# Patient Record
Sex: FEMALE | Race: BLACK OR AFRICAN AMERICAN | NOT HISPANIC OR LATINO | Employment: FULL TIME | URBAN - METROPOLITAN AREA
[De-identification: names, ages, dates, MRNs, and addresses within clinical notes are randomized per-mention and may not be internally consistent; named-entity substitution may affect disease eponyms.]

---

## 2018-07-13 ENCOUNTER — DOCUMENTATION (OUTPATIENT)
Dept: BARIATRICS/WEIGHT MGMT | Facility: CLINIC | Age: 33
End: 2018-07-13

## 2018-07-13 DIAGNOSIS — R53.83 FATIGUE, UNSPECIFIED TYPE: ICD-10-CM

## 2018-07-13 DIAGNOSIS — R06.00 DYSPNEA, UNSPECIFIED TYPE: Primary | ICD-10-CM

## 2018-07-13 RX ORDER — AMLODIPINE BESYLATE AND ATORVASTATIN CALCIUM 2.5; 1 MG/1; MG/1
1 TABLET, FILM COATED ORAL EVERY MORNING
COMMUNITY
End: 2018-07-29

## 2018-07-13 RX ORDER — ACETAZOLAMIDE 500 MG/1
500 CAPSULE, EXTENDED RELEASE ORAL DAILY
COMMUNITY
End: 2021-07-05

## 2018-07-13 RX ORDER — HYDROCHLOROTHIAZIDE 25 MG/1
25 TABLET ORAL DAILY
COMMUNITY
End: 2019-03-31 | Stop reason: HOSPADM

## 2018-07-17 DIAGNOSIS — R06.00 DYSPNEA, UNSPECIFIED TYPE: ICD-10-CM

## 2018-07-17 DIAGNOSIS — R53.83 FATIGUE, UNSPECIFIED TYPE: ICD-10-CM

## 2018-07-18 ENCOUNTER — DOCUMENTATION (OUTPATIENT)
Dept: BARIATRICS/WEIGHT MGMT | Facility: CLINIC | Age: 33
End: 2018-07-18

## 2018-07-18 ENCOUNTER — OFFICE VISIT (OUTPATIENT)
Dept: BARIATRICS/WEIGHT MGMT | Facility: CLINIC | Age: 33
End: 2018-07-18

## 2018-07-18 VITALS
TEMPERATURE: 97.5 F | DIASTOLIC BLOOD PRESSURE: 72 MMHG | SYSTOLIC BLOOD PRESSURE: 118 MMHG | RESPIRATION RATE: 18 BRPM | WEIGHT: 293 LBS | BODY MASS INDEX: 47.09 KG/M2 | OXYGEN SATURATION: 99 % | HEIGHT: 66 IN | HEART RATE: 71 BPM

## 2018-07-18 DIAGNOSIS — R53.83 OTHER FATIGUE: ICD-10-CM

## 2018-07-18 DIAGNOSIS — R10.13 DYSPEPSIA: ICD-10-CM

## 2018-07-18 DIAGNOSIS — I10 HYPERTENSION, UNSPECIFIED TYPE: ICD-10-CM

## 2018-07-18 DIAGNOSIS — G93.2 PSEUDOTUMOR CEREBRI: ICD-10-CM

## 2018-07-18 DIAGNOSIS — F41.9 ANXIETY: ICD-10-CM

## 2018-07-18 DIAGNOSIS — E66.01 MORBID OBESITY WITH BMI OF 50.0-59.9, ADULT (HCC): ICD-10-CM

## 2018-07-18 DIAGNOSIS — K21.9 GASTROESOPHAGEAL REFLUX DISEASE, ESOPHAGITIS PRESENCE NOT SPECIFIED: ICD-10-CM

## 2018-07-18 DIAGNOSIS — E28.2 PCOS (POLYCYSTIC OVARIAN SYNDROME): ICD-10-CM

## 2018-07-18 DIAGNOSIS — E88.81 INSULIN RESISTANCE: ICD-10-CM

## 2018-07-18 DIAGNOSIS — R10.13 DYSPEPSIA: Primary | ICD-10-CM

## 2018-07-18 DIAGNOSIS — J30.9 ALLERGIC RHINITIS, UNSPECIFIED CHRONICITY, UNSPECIFIED SEASONALITY, UNSPECIFIED TRIGGER: ICD-10-CM

## 2018-07-18 DIAGNOSIS — R20.2 PARESTHESIAS: ICD-10-CM

## 2018-07-18 DIAGNOSIS — R06.2 WHEEZING: ICD-10-CM

## 2018-07-18 DIAGNOSIS — R60.0 LOWER EXTREMITY EDEMA: ICD-10-CM

## 2018-07-18 PROCEDURE — 99204 OFFICE O/P NEW MOD 45 MIN: CPT | Performed by: PHYSICIAN ASSISTANT

## 2018-07-18 RX ORDER — SODIUM CHLORIDE 9 MG/ML
100 INJECTION, SOLUTION INTRAVENOUS CONTINUOUS
Status: CANCELLED | OUTPATIENT
Start: 2018-07-18 | End: 2019-07-18

## 2018-07-18 NOTE — PROGRESS NOTES
[facility] BARIATRIC SURGERY  [address]  [facility] [address]  [phone]      Patient  Name:  Deepika Gonzalez  :  1985      Date of Visit: 2018      Chief Complaint:  weight gain; unable to maintain weight loss    History of Present Illness:  Deepika Gonzalez is a 32 y.o. female who presents today for evaluation, education and consultation regarding weight loss surgery. The patient is interested in sleeve gastrectomy.     Deepika has been overweight for at least 25 years, has been 35 pounds or more overweight for at least 20 years, has been 100 pounds or more overweight for 10 or more years and started dieting at age 20.      Previous diet attempts include: High Protein, Low Carbohydrate, Calorie Counting and Fasting; Weight Watchers; None.  The most weight Deepika lost was 60 pounds on diet/ exercise but was only able to maintain that weight loss for 1 year.  Her maximum lifetime weight is 333 pounds.    As above, patient has been overweight for many years, with numerous failed dietary/weight loss attempts.  She now has obesity related comorbidities and as such has decided to pursue weight loss surgery. Patient is very determined to be successful with weightloss with LSG. Looking to change her lifestyle to improve her overall health.     GI history: Has had reflux on PPI in the past, symptoms have improved and is off PPI now. No recent antacid use. No other GI complaints. No h/o h pylori.  EGD  showed evidence of reflux- per patient.  Still has GB.     All past medical, surgical, social and family history have been obtained and discussed as pertinent to bariatric surgery as below.     Past Medical History:   Diagnosis Date   • Allergic rhinitis    • Anxiety     seeing therapist   • Dyspepsia    • Fatigue    • GERD (gastroesophageal reflux disease)     has been on PPI in the past, improved recently- no longer on antacids. EGD , no h/o h pylori.    • HTN  (hypertension)    • Insulin resistance    • Lower extremity edema    • Morbid obesity with BMI of 50.0-59.9, adult (CMS/[facility])    • Paresthesias     of hands and feet, thought to be 2/2 diamox   • PCOS (polycystic ovarian syndrome)    • Pseudotumor cerebri     followed by opthalmologist- pending neurology specialist.  Has HA, on diamox.    • Wheezing     in the past due to allergies, improved now. No longer uses inhaler.      Past Surgical History:   Procedure Laterality Date   • EAR TUBES  1995    x 3   • ENDOSCOPY  2005    Dr Hay in [facility],  showed reflux       No Known Allergies    Current Outpatient Prescriptions:   •  acetaZOLAMIDE (DIAMOX) 500 MG capsule, Take 500 mg by mouth 2 (Two) Times a Day., Disp: , Rfl:   •  amLODIPine-atorvastatin (CADUET) 2.5-10 MG per tablet, Take 1 tablet by mouth Daily., Disp: , Rfl:   •  hydrochlorothiazide (HYDRODIURIL) 25 MG tablet, Take 25 mg by mouth Daily., Disp: , Rfl:     Social History     Social History   • Marital status: Single     Spouse name: N/A   • Number of children: N/A   • Years of education: Masters Degree      Occupational History   •       Social History Main Topics   • Smoking status: Never Smoker   • Smokeless tobacco: Never Used   • Alcohol use No   • Drug use: No   • Sexual activity: Not on file      Comment: no hormones     Other Topics Concern   • Not on file     Social History Narrative    Lives in [place] with mother.  Works in , travels a lot.      Family History   Problem Relation Age of Onset   • Obesity Mother    • Hypertension Mother    • Sleep apnea Mother    • Obesity Father    • Hypertension Father    • Heart disease Father    • Sleep apnea Father    • Heart disease Maternal Grandmother    • Hypertension Maternal Grandmother    • Diabetes Paternal Grandmother    • Hypertension Paternal Grandfather        Review of Systems:  Constitutional:  Reports fatigue, weight gain and denies fevers, chills.  HEENT:   allergies  Cardiovascular:  Reports HTN, edema and denies HLD, CAD, Atrial Fib, hx heart disease, heart murmur, hx MI, chest pain, palpitations, hx DVT.  Respiratory:  Reports wheezing and denies dyspnea on exertion, shortness of breath , cough , sleep apnea, asthma, hx PE.  Gastrointestinal:  Reports heartburn and denies dysphagia, nausea, vomiting, abdominal pain, IBS, diarrhea, constipation, melena, blood in stool, gallbladder issues, liver disease, hx pancreatitis.  Genitourinary:  Reports none and denies history of  frequent UTI, incontinence, hematuria, dysuria, polyuria, polydipsia, renal insufficiency, renal failure.    Musculoskeletal:  Reports none and denies joint pain, fibromyalgia, arthritis and autoimmune disease.  Neurological:  Reports headaches, pseudotumor cerebri and denies dizziness, confusion, seizure, stroke.  Psychiatric:  Reports anxiety and denies depression, bipolar disorder, hx suicide attempt, hx self injury, eating disorder.  Endocrine:  Reports glucose intolerance and denies thyroid disease, gout.  Hematologic:  Reports none and denies anemia, bleeding disorder, hx blood transfusion.  Skin:  Reports none and denies rashes, hx MRSA.      Physical Exam:  Vital Signs:  Weight: (!) 151 kg (333 lb 0.2 oz)   Body mass index is 54.57 kg/m².  Temp: 97.5 °F (36.4 °C)   Heart Rate: 71   BP: 118/72     Physical Exam   Constitutional: She is oriented to person, place, and time. She appears well-developed and well-nourished.   HENT:   Head: Normocephalic.   Mouth/Throat: Oropharynx is clear and moist.   Eyes: EOM are normal.   Neck: Normal range of motion. Neck supple. No thyromegaly present.   Cardiovascular: Normal rate, regular rhythm and normal heart sounds.    Pulmonary/Chest: Effort normal and breath sounds normal. No respiratory distress. She has no wheezes.   Abdominal: Soft. Bowel sounds are normal. She exhibits no distension. There is no tenderness.   Musculoskeletal: Normal range of  motion. She exhibits edema.   Neurological: She is alert and oriented to person, place, and time.   Skin: Skin is warm and dry.   Psychiatric: She has a normal mood and affect. Her behavior is normal. Judgment and thought content normal.   Vitals reviewed.      Patient Active Problem List   Diagnosis   • PCOS (polycystic ovarian syndrome)   • Pseudotumor cerebri   • HTN (hypertension)   • Insulin resistance   • Fatigue   • Morbid obesity with BMI of 50.0-59.9, adult (CMS/[facility])   • Dyspepsia   • Allergic rhinitis   • Lower extremity edema   • Anxiety   • Wheezing   • Paresthesias   • GERD (gastroesophageal reflux disease)       Assessment:    Deepika Gonzalez is a 32 y.o. year old female with medically complicated obesity pursuing sleeve gastrectomy.    Weight loss surgery is deemed medically necessary given the following obesity related comorbidities including hypertension, GERD, edema and depression with current Weight: (!) 151 kg (333 lb 0.2 oz) and Body mass index is 54.57 kg/m²..    Plan:  The consultation plan and program requirements were reviewed with the patient.  The patient has been advised that a letter of medical support must be obtained from her primary care physician or referring provider. A psychological evaluation will be arranged.  A nutritional evaluation will be performed.  The patient was advised to start a high protein and low carbohydrate diet.  Necessary lifestyle modifications were discussed.  Instructions on how to access JOSE was given to the patient.  JOSE is an internet based educational video that explains the surgical procedure chosen and answers basic questions regarding that procedure.     Preoperative testing will include: CBC, CMP, Lipids, TSH, HgA1C, H.Pylori, Pulmonary Function Testing, CXR, EKG and EGD     Additional preop clearances required prior to surgery: Cardiac.  Will schedule with [facility].     The patient has been educated on expected postoperative lifestyle changes, including  commitment to high protein diet, vitamin regimen, and exercise program.  They are aware that support groups are encouraged for optimal weight loss results. Patient understands that bariatric surgery is not cosmetic surgery but rather a tool to help make a lifelong commitment to lifestyle changes including diet, exercise, behavior modifications, and healthy habits. The procedure was discussed with the patient and all questions were answered. The importance of avoiding ASA/ NSAIDS/ steroids/ tobacco/ hormones/ immunomodulators perioperatively was discussed.         Roma Delgadillo PA-C

## 2018-07-19 LAB
ALBUMIN SERPL-MCNC: 4 G/DL (ref 3.5–5.5)
ALBUMIN/GLOB SERPL: 1.3 {RATIO} (ref 1.2–2.2)
ALP SERPL-CCNC: 88 IU/L (ref 39–117)
ALT SERPL-CCNC: 12 IU/L (ref 0–32)
AST SERPL-CCNC: 15 IU/L (ref 0–40)
BILIRUB SERPL-MCNC: 0.5 MG/DL (ref 0–1.2)
BUN SERPL-MCNC: 11 MG/DL (ref 6–20)
BUN/CREAT SERPL: 12 (ref 9–23)
CALCIUM SERPL-MCNC: 8.7 MG/DL (ref 8.7–10.2)
CHLORIDE SERPL-SCNC: 110 MMOL/L (ref 96–106)
CHOLEST SERPL-MCNC: 158 MG/DL (ref 100–199)
CO2 SERPL-SCNC: 18 MMOL/L (ref 20–29)
CREAT SERPL-MCNC: 0.92 MG/DL (ref 0.57–1)
ERYTHROCYTE [DISTWIDTH] IN BLOOD BY AUTOMATED COUNT: 16.3 % (ref 12.3–15.4)
GLOBULIN SER CALC-MCNC: 3.2 G/DL (ref 1.5–4.5)
GLUCOSE SERPL-MCNC: 86 MG/DL (ref 65–99)
HCT VFR BLD AUTO: 36 % (ref 34–46.6)
HDLC SERPL-MCNC: 60 MG/DL
HGB BLD-MCNC: 12 G/DL (ref 11.1–15.9)
LDLC SERPL CALC-MCNC: 87 MG/DL (ref 0–99)
MCH RBC QN AUTO: 28.5 PG (ref 26.6–33)
MCHC RBC AUTO-ENTMCNC: 33.3 G/DL (ref 31.5–35.7)
MCV RBC AUTO: 86 FL (ref 79–97)
PLATELET # BLD AUTO: 363 X10E3/UL (ref 150–379)
POTASSIUM SERPL-SCNC: 4.3 MMOL/L (ref 3.5–5.2)
PROT SERPL-MCNC: 7.2 G/DL (ref 6–8.5)
RBC # BLD AUTO: 4.21 X10E6/UL (ref 3.77–5.28)
SODIUM SERPL-SCNC: 138 MMOL/L (ref 134–144)
TRIGL SERPL-MCNC: 53 MG/DL (ref 0–149)
TSH SERPL DL<=0.005 MIU/L-ACNC: 1.57 UIU/ML (ref 0.45–4.5)
VLDLC SERPL CALC-MCNC: 11 MG/DL (ref 5–40)
WBC # BLD AUTO: 8.5 X10E3/UL (ref 3.4–10.8)

## 2018-07-20 PROBLEM — K21.9 GASTROESOPHAGEAL REFLUX DISEASE: Status: ACTIVE | Noted: 2018-07-20

## 2018-07-24 NOTE — PROGRESS NOTES
Weight Loss Surgery  Presurgical Nutrition Assessment     Deepika Gonzalez  07/18/2018  [id number]  [id number]  1985  female    Surgery desired: Sleeve Gastrectomy    Weight: (!) 151 kg (333 lb 0.2 oz)   Body mass index is 54.57 kg/m².  Past Medical History:   Diagnosis Date   • Allergic rhinitis    • Anxiety     seeing therapist   • Dyspepsia    • Fatigue    • GERD (gastroesophageal reflux disease)     has been on PPI in the past, improved recently- no longer on antacids. EGD 2005, no h/o h pylori.    • HTN (hypertension)    • Insulin resistance    • Lower extremity edema    • Morbid obesity with BMI of 50.0-59.9, adult (CMS/[facility])    • Paresthesias     of hands and feet, thought to be 2/2 diamox   • PCOS (polycystic ovarian syndrome)    • Pseudotumor cerebri     followed by opthalmologist- pending neurology specialist.  Has HA, on diamox.    • Wheezing     in the past due to allergies, improved now. No longer uses inhaler.      Past Surgical History:   Procedure Laterality Date   • EAR TUBES  1995    x 3   • ENDOSCOPY  2005    Dr Hay in [facility],  showed reflux     No Known Allergies    Current Outpatient Prescriptions:   •  acetaZOLAMIDE (DIAMOX) 500 MG capsule, Take 500 mg by mouth 2 (Two) Times a Day., Disp: , Rfl:   •  amLODIPine-atorvastatin (CADUET) 2.5-10 MG per tablet, Take 1 tablet by mouth Daily., Disp: , Rfl:   •  hydrochlorothiazide (HYDRODIURIL) 25 MG tablet, Take 25 mg by mouth Daily., Disp: , Rfl:       Nutrition Assessment    Estimated energy needs: 2400    Estimated calories for weight loss: 1800    IBW (Pounds):  150      Excess body weight (Pounds):183       Nutrition Recall  24 Hour recall: (B) (L) (D) -  Reviewed and discussed with patient       Exercise  never      Education    Provided handouts for Sleeve Gastrectomy    Provided follow-up options for support, including contact information for dietitians here, if desired.  Web-based support information and apps for smart phones and computers  given.  Noted that monthly support group is offered at this clinic, and that support is associated with weight loss.    Recommend that team proceed with surgery and follow per protocol.      Nutrition Goals   Dietary Guidelines per manual  Protein goal:  grams per day   Eliminate soda    Exercise Goals  Add 15-30 minutes of activity per day as tolerated        Lucero Ragland RD  07/18/2018  1:23 PM

## 2018-07-29 ENCOUNTER — APPOINTMENT (OUTPATIENT)
Dept: PREADMISSION TESTING | Facility: HOSPITAL | Age: 33
End: 2018-07-29

## 2018-07-29 PROCEDURE — 93010 ELECTROCARDIOGRAM REPORT: CPT | Performed by: INTERNAL MEDICINE

## 2018-07-29 PROCEDURE — 93005 ELECTROCARDIOGRAM TRACING: CPT

## 2018-07-29 RX ORDER — BUSPIRONE HYDROCHLORIDE 15 MG/1
15 TABLET ORAL 2 TIMES DAILY PRN
COMMUNITY
End: 2021-07-05

## 2018-07-29 RX ORDER — AMLODIPINE BESYLATE AND ATORVASTATIN CALCIUM 5; 10 MG/1; MG/1
1 TABLET, FILM COATED ORAL DAILY
COMMUNITY
End: 2018-09-20

## 2018-07-29 NOTE — PAT
Too early to obtain potassium in PAT -please obtain in preop    CBC 7/18/18 on chart     Patient is a alexis and requests that extreme caution be used during intubation/anesthesia/EGD in relation to vocal cords.  Orange note placed on chart as well

## 2018-07-29 NOTE — DISCHARGE INSTRUCTIONS

## 2018-08-01 ENCOUNTER — ANESTHESIA EVENT (OUTPATIENT)
Dept: GASTROENTEROLOGY | Facility: HOSPITAL | Age: 33
End: 2018-08-01

## 2018-08-02 ENCOUNTER — ANESTHESIA (OUTPATIENT)
Dept: GASTROENTEROLOGY | Facility: HOSPITAL | Age: 33
End: 2018-08-02

## 2018-08-02 ENCOUNTER — HOSPITAL ENCOUNTER (OUTPATIENT)
Facility: HOSPITAL | Age: 33
Setting detail: HOSPITAL OUTPATIENT SURGERY
Discharge: HOME | End: 2018-08-02
Attending: SURGERY | Admitting: SURGERY

## 2018-08-02 VITALS
HEART RATE: 64 BPM | RESPIRATION RATE: 20 BRPM | DIASTOLIC BLOOD PRESSURE: 73 MMHG | SYSTOLIC BLOOD PRESSURE: 128 MMHG | OXYGEN SATURATION: 100 % | TEMPERATURE: 97.4 F

## 2018-08-02 DIAGNOSIS — K21.9 GASTROESOPHAGEAL REFLUX DISEASE, ESOPHAGITIS PRESENCE NOT SPECIFIED: ICD-10-CM

## 2018-08-02 LAB — B-HCG UR QL: NEGATIVE

## 2018-08-02 PROCEDURE — 25010000002 PROPOFOL 1000 MG/ML EMULSION: Performed by: NURSE ANESTHETIST, CERTIFIED REGISTERED

## 2018-08-02 PROCEDURE — 43239 EGD BIOPSY SINGLE/MULTIPLE: CPT | Performed by: SURGERY

## 2018-08-02 PROCEDURE — 88305 TISSUE EXAM BY PATHOLOGIST: CPT | Performed by: SURGERY

## 2018-08-02 PROCEDURE — 25010000002 PROPOFOL 10 MG/ML EMULSION: Performed by: NURSE ANESTHETIST, CERTIFIED REGISTERED

## 2018-08-02 PROCEDURE — 84132 ASSAY OF SERUM POTASSIUM: CPT | Performed by: ANESTHESIOLOGY

## 2018-08-02 PROCEDURE — 81025 URINE PREGNANCY TEST: CPT | Performed by: ANESTHESIOLOGY

## 2018-08-02 RX ORDER — FAMOTIDINE 20 MG/1
20 TABLET, FILM COATED ORAL ONCE
Status: DISCONTINUED | OUTPATIENT
Start: 2018-08-02 | End: 2018-08-02 | Stop reason: HOSPADM

## 2018-08-02 RX ORDER — SODIUM CHLORIDE 9 MG/ML
100 INJECTION, SOLUTION INTRAVENOUS CONTINUOUS
Status: DISCONTINUED | OUTPATIENT
Start: 2018-08-02 | End: 2018-08-14 | Stop reason: HOSPADM

## 2018-08-02 RX ORDER — LIDOCAINE HYDROCHLORIDE 10 MG/ML
INJECTION, SOLUTION EPIDURAL; INFILTRATION; INTRACAUDAL; PERINEURAL AS NEEDED
Status: DISCONTINUED | OUTPATIENT
Start: 2018-08-02 | End: 2018-08-02 | Stop reason: SURG

## 2018-08-02 RX ORDER — LIDOCAINE HYDROCHLORIDE 10 MG/ML
0.5 INJECTION, SOLUTION EPIDURAL; INFILTRATION; INTRACAUDAL; PERINEURAL ONCE AS NEEDED
Status: DISCONTINUED | OUTPATIENT
Start: 2018-08-02 | End: 2018-08-02 | Stop reason: HOSPADM

## 2018-08-02 RX ORDER — SODIUM CHLORIDE 0.9 % (FLUSH) 0.9 %
1-10 SYRINGE (ML) INJECTION AS NEEDED
Status: DISCONTINUED | OUTPATIENT
Start: 2018-08-02 | End: 2018-08-02 | Stop reason: HOSPADM

## 2018-08-02 RX ORDER — SODIUM CHLORIDE, SODIUM LACTATE, POTASSIUM CHLORIDE, CALCIUM CHLORIDE 600; 310; 30; 20 MG/100ML; MG/100ML; MG/100ML; MG/100ML
9 INJECTION, SOLUTION INTRAVENOUS CONTINUOUS
Status: DISCONTINUED | OUTPATIENT
Start: 2018-08-02 | End: 2018-08-14 | Stop reason: HOSPADM

## 2018-08-02 RX ORDER — ONDANSETRON 2 MG/ML
4 INJECTION INTRAMUSCULAR; INTRAVENOUS ONCE AS NEEDED
Status: DISCONTINUED | OUTPATIENT
Start: 2018-08-02 | End: 2018-08-14 | Stop reason: HOSPADM

## 2018-08-02 RX ORDER — PROPOFOL 10 MG/ML
VIAL (ML) INTRAVENOUS AS NEEDED
Status: DISCONTINUED | OUTPATIENT
Start: 2018-08-02 | End: 2018-08-02 | Stop reason: SURG

## 2018-08-02 RX ORDER — FAMOTIDINE 10 MG/ML
20 INJECTION, SOLUTION INTRAVENOUS ONCE
Status: COMPLETED | OUTPATIENT
Start: 2018-08-02 | End: 2018-08-02

## 2018-08-02 RX ADMIN — PROPOFOL 100 MCG/KG/MIN: 10 INJECTION, EMULSION INTRAVENOUS at 12:39

## 2018-08-02 RX ADMIN — SODIUM CHLORIDE 100 ML/HR: 9 INJECTION, SOLUTION INTRAVENOUS at 11:59

## 2018-08-02 RX ADMIN — PROPOFOL 150 MG: 10 INJECTION, EMULSION INTRAVENOUS at 12:39

## 2018-08-02 RX ADMIN — SODIUM CHLORIDE, POTASSIUM CHLORIDE, SODIUM LACTATE AND CALCIUM CHLORIDE: 600; 310; 30; 20 INJECTION, SOLUTION INTRAVENOUS at 12:33

## 2018-08-02 RX ADMIN — LIDOCAINE HYDROCHLORIDE 100 MG: 10 INJECTION, SOLUTION EPIDURAL; INFILTRATION; INTRACAUDAL; PERINEURAL at 12:39

## 2018-08-02 RX ADMIN — FAMOTIDINE 20 MG: 10 INJECTION INTRAVENOUS at 11:59

## 2018-08-02 NOTE — ANESTHESIA PREPROCEDURE EVALUATION
Anesthesia Evaluation     Patient summary reviewed and Nursing notes reviewed   NPO Solid Status: > 8 hours  NPO Liquid Status: > 8 hours           Airway   Mallampati: I  TM distance: >3 FB  Neck ROM: full  No difficulty expected  Dental - normal exam     Pulmonary    Cardiovascular     ECG reviewed    (+) hypertension,       Neuro/Psych  GI/Hepatic/Renal/Endo    (+) morbid obesity,      Musculoskeletal     Abdominal    Substance History      OB/GYN    (-)  Pregnant        Other                      Anesthesia Plan    ASA 2     general     intravenous induction   Anesthetic plan and risks discussed with patient.    Plan discussed with CRNA.

## 2018-08-02 NOTE — H&P (VIEW-ONLY)
[facility] BARIATRIC SURGERY  [address]  [facility] [address]  [phone]      Patient  Name:  Deepika Gonzalez  :  1985      Date of Visit: 2018      Chief Complaint:  weight gain; unable to maintain weight loss    History of Present Illness:  Deepika Gonzalez is a 32 y.o. female who presents today for evaluation, education and consultation regarding weight loss surgery. The patient is interested in sleeve gastrectomy.     Deepika has been overweight for at least 25 years, has been 35 pounds or more overweight for at least 20 years, has been 100 pounds or more overweight for 10 or more years and started dieting at age 20.      Previous diet attempts include: High Protein, Low Carbohydrate, Calorie Counting and Fasting; Weight Watchers; None.  The most weight Deepika lost was 60 pounds on diet/ exercise but was only able to maintain that weight loss for 1 year.  Her maximum lifetime weight is 333 pounds.    As above, patient has been overweight for many years, with numerous failed dietary/weight loss attempts.  She now has obesity related comorbidities and as such has decided to pursue weight loss surgery. Patient is very determined to be successful with weightloss with LSG. Looking to change her lifestyle to improve her overall health.     GI history: Has had reflux on PPI in the past, symptoms have improved and is off PPI now. No recent antacid use. No other GI complaints. No h/o h pylori.  EGD  showed evidence of reflux- per patient.  Still has GB.     All past medical, surgical, social and family history have been obtained and discussed as pertinent to bariatric surgery as below.     Past Medical History:   Diagnosis Date   • Allergic rhinitis    • Anxiety     seeing therapist   • Dyspepsia    • Fatigue    • GERD (gastroesophageal reflux disease)     has been on PPI in the past, improved recently- no longer on antacids. EGD , no h/o h pylori.    • HTN  (hypertension)    • Insulin resistance    • Lower extremity edema    • Morbid obesity with BMI of 50.0-59.9, adult (CMS/[facility])    • Paresthesias     of hands and feet, thought to be 2/2 diamox   • PCOS (polycystic ovarian syndrome)    • Pseudotumor cerebri     followed by opthalmologist- pending neurology specialist.  Has HA, on diamox.    • Wheezing     in the past due to allergies, improved now. No longer uses inhaler.      Past Surgical History:   Procedure Laterality Date   • EAR TUBES  1995    x 3   • ENDOSCOPY  2005    Dr Hay in [facility],  showed reflux       No Known Allergies    Current Outpatient Prescriptions:   •  acetaZOLAMIDE (DIAMOX) 500 MG capsule, Take 500 mg by mouth 2 (Two) Times a Day., Disp: , Rfl:   •  amLODIPine-atorvastatin (CADUET) 2.5-10 MG per tablet, Take 1 tablet by mouth Daily., Disp: , Rfl:   •  hydrochlorothiazide (HYDRODIURIL) 25 MG tablet, Take 25 mg by mouth Daily., Disp: , Rfl:     Social History     Social History   • Marital status: Single     Spouse name: N/A   • Number of children: N/A   • Years of education: Masters Degree      Occupational History   •       Social History Main Topics   • Smoking status: Never Smoker   • Smokeless tobacco: Never Used   • Alcohol use No   • Drug use: No   • Sexual activity: Not on file      Comment: no hormones     Other Topics Concern   • Not on file     Social History Narrative    Lives in [place] with [place].  Works in , travels a lot.      Family History   Problem Relation Age of Onset   • Obesity Mother    • Hypertension Mother    • Sleep apnea Mother    • Obesity Father    • Hypertension Father    • Heart disease Father    • Sleep apnea Father    • Heart disease Maternal Grandmother    • Hypertension Maternal Grandmother    • Diabetes Paternal Grandmother    • Hypertension Paternal Grandfather        Review of Systems:  Constitutional:  Reports fatigue, weight gain and denies fevers, chills.  HEENT:   allergies  Cardiovascular:  Reports HTN, edema and denies HLD, CAD, Atrial Fib, hx heart disease, heart murmur, hx MI, chest pain, palpitations, hx DVT.  Respiratory:  Reports wheezing and denies dyspnea on exertion, shortness of breath , cough , sleep apnea, asthma, hx PE.  Gastrointestinal:  Reports heartburn and denies dysphagia, nausea, vomiting, abdominal pain, IBS, diarrhea, constipation, melena, blood in stool, gallbladder issues, liver disease, hx pancreatitis.  Genitourinary:  Reports none and denies history of  frequent UTI, incontinence, hematuria, dysuria, polyuria, polydipsia, renal insufficiency, renal failure.    Musculoskeletal:  Reports none and denies joint pain, fibromyalgia, arthritis and autoimmune disease.  Neurological:  Reports headaches, pseudotumor cerebri and denies dizziness, confusion, seizure, stroke.  Psychiatric:  Reports anxiety and denies depression, bipolar disorder, hx suicide attempt, hx self injury, eating disorder.  Endocrine:  Reports glucose intolerance and denies thyroid disease, gout.  Hematologic:  Reports none and denies anemia, bleeding disorder, hx blood transfusion.  Skin:  Reports none and denies rashes, hx MRSA.      Physical Exam:  Vital Signs:  Weight: (!) 151 kg (333 lb 0.2 oz)   Body mass index is 54.57 kg/m².  Temp: 97.5 °F (36.4 °C)   Heart Rate: 71   BP: 118/72     Physical Exam   Constitutional: She is oriented to person, place, and time. She appears well-developed and well-nourished.   HENT:   Head: Normocephalic.   Mouth/Throat: Oropharynx is clear and moist.   Eyes: EOM are normal.   Neck: Normal range of motion. Neck supple. No thyromegaly present.   Cardiovascular: Normal rate, regular rhythm and normal heart sounds.    Pulmonary/Chest: Effort normal and breath sounds normal. No respiratory distress. She has no wheezes.   Abdominal: Soft. Bowel sounds are normal. She exhibits no distension. There is no tenderness.   Musculoskeletal: Normal range of  motion. She exhibits edema.   Neurological: She is alert and oriented to person, place, and time.   Skin: Skin is warm and dry.   Psychiatric: She has a normal mood and affect. Her behavior is normal. Judgment and thought content normal.   Vitals reviewed.      Patient Active Problem List   Diagnosis   • PCOS (polycystic ovarian syndrome)   • Pseudotumor cerebri   • HTN (hypertension)   • Insulin resistance   • Fatigue   • Morbid obesity with BMI of 50.0-59.9, adult (CMS/[facility])   • Dyspepsia   • Allergic rhinitis   • Lower extremity edema   • Anxiety   • Wheezing   • Paresthesias   • GERD (gastroesophageal reflux disease)       Assessment:    Deepika Gonzalez is a 32 y.o. year old female with medically complicated obesity pursuing sleeve gastrectomy.    Weight loss surgery is deemed medically necessary given the following obesity related comorbidities including hypertension, GERD, edema and depression with current Weight: (!) 151 kg (333 lb 0.2 oz) and Body mass index is 54.57 kg/m²..    Plan:  The consultation plan and program requirements were reviewed with the patient.  The patient has been advised that a letter of medical support must be obtained from her primary care physician or referring provider. A psychological evaluation will be arranged.  A nutritional evaluation will be performed.  The patient was advised to start a high protein and low carbohydrate diet.  Necessary lifestyle modifications were discussed.  Instructions on how to access JOSE was given to the patient.  JOSE is an internet based educational video that explains the surgical procedure chosen and answers basic questions regarding that procedure.     Preoperative testing will include: CBC, CMP, Lipids, TSH, HgA1C, H.Pylori, Pulmonary Function Testing, CXR, EKG and EGD     Additional preop clearances required prior to surgery: Cardiac.  Will schedule with [facility].     The patient has been educated on expected postoperative lifestyle changes, including  commitment to high protein diet, vitamin regimen, and exercise program.  They are aware that support groups are encouraged for optimal weight loss results. Patient understands that bariatric surgery is not cosmetic surgery but rather a tool to help make a lifelong commitment to lifestyle changes including diet, exercise, behavior modifications, and healthy habits. The procedure was discussed with the patient and all questions were answered. The importance of avoiding ASA/ NSAIDS/ steroids/ tobacco/ hormones/ immunomodulators perioperatively was discussed.         Roma Delgadillo PA-C

## 2018-08-02 NOTE — BRIEF OP NOTE
ESOPHAGOGASTRODUODENOSCOPY WITH BIOPSY  Progress Note    Deepika Gonzalez  8/2/2018    Pre-op Diagnosis:   Gastroesophageal reflux disease, esophagitis presence not specified [K21.9]       Post-Op Diagnosis Codes:     * Gastroesophageal reflux disease, esophagitis presence not specified [K21.9]    Procedure/CPT® Codes:  WA EGD TRANSORAL BIOPSY SINGLE/MULTIPLE [41436]    Procedure(s):  ESOPHAGOGASTRODUODENOSCOPY WITH BIOPSY    Surgeon(s):  Daquan Martinez MD    Anesthesia: * No anesthesia type entered *    Staff:   Circulator: Sis You RN  Endo Technician: Giuliana Lezama    Estimated Blood Loss: none    Urine Voided: * No values recorded between 8/2/2018 12:33 PM and 8/2/2018 12:40 PM *    Specimens:                ID Type Source Tests Collected by Time   A : antrum bx Tissue Gastric, Antrum TISSUE PATHOLOGY EXAM Daquan Martinez MD 8/2/2018 1238   B : distal esophagus bx Tissue Esophagus, Distal TISSUE PATHOLOGY EXAM Daquan Martinez MD 8/2/2018 1239         Drains:      Findings:     Complications: none      Daquan Martinez MD     Date: 8/2/2018  Time: 12:44 PM

## 2018-08-02 NOTE — ANESTHESIA POSTPROCEDURE EVALUATION
Patient: Deepika Gonzalez    Procedure Summary     Date:  08/02/18 Room / Location:   [facility] 2 /  [facility]    Anesthesia Start:  1233 Anesthesia Stop:      Procedure:  ESOPHAGOGASTRODUODENOSCOPY WITH BIOPSY Diagnosis:       Gastroesophageal reflux disease, esophagitis presence not specified      (Gastroesophageal reflux disease, esophagitis presence not specified [K21.9])    Surgeon:  Daquan Martinez MD Provider:  Paco Roberts MD    Anesthesia Type:  general ASA Status:  2          Anesthesia Type: general  Last vitals  BP   132/78   Temp   97.5   Pulse   86   Resp   18     SpO2   99%     Post Anesthesia Care and Evaluation    Patient location during evaluation: PACU  Patient participation: complete - patient participated  Level of consciousness: awake  Pain score: 0  Pain management: adequate  Airway patency: patent  Anesthetic complications: No anesthetic complications  PONV Status: none  Cardiovascular status: acceptable and stable  Respiratory status: nasal cannula, unassisted and acceptable  Hydration status: acceptable

## 2018-08-02 NOTE — OP NOTE
Preoperative Diagnosis:  GERD    Postoperative Diagnosis:  Same    Procedure:   EGD with biopsy x 2    Surgeon:  Michelle    Anesth:  MAC    Specimens: antrum, DE    Complics:  None    Findings:  Unremarkable exam, z line 38 cm    Indications:   This is a 33 yo super MO BF interested in LSG.  I did her friend's LSG. She had a hx of NOA dx by EGD 2006, sx's better last few years, no meds.  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   Please see our office notes.   Risks, benefits and alternative therapies were discussed and the patient wishes to proceed with diagnostic possible therapeutic upper endoscopy    Operative Technique:  The patient was brought to the endoscopy suite and placed supine upon the stretcher. A bite block was placed.  The patient was sedated by the anesthesiology staff and the standard flexible endoscope was advanced under direct visualization into the posterior pharynx, vocal cords appeared normal and the esophagus was intubated and the endoscope advanced easily through the esophagus, and into the gastric cavity. The pylorus was readily identified and intubated and the endoscope advanced into the first and second portions of the duodenum.  The endoscope was withdrawn to the antrum and a biopsy was obtained.  Retroflexed examination was performed visualizing the hiatus, cardia, fundus and body.   The endoscope was withdrawn to the Z line and photodocumentation obtained.  The endoscope was slowly withdrawn, no new abnormalities noted.      Prox esoph - no strictures, distention, retained secretions or tertiary spasms.  Distal esoph - no hiatal hernia, Peng's or gross esophagitis.  Gastric mucosa grossly normal.  Pylorus not deformed or in spasm.  Duodenum unremarkable.  Retroflexed exam no HH or other abnormalities of the cardia, body or fundus.  Zline 38 cm, bx above the zline obtained.        The patient tolerated the procedure well without  complication and was taken to the recovery room in stable condition.

## 2018-08-03 LAB
CYTO UR: NORMAL
LAB AP CASE REPORT: NORMAL
LAB AP CLINICAL INFORMATION: NORMAL
PATH REPORT.FINAL DX SPEC: NORMAL
PATH REPORT.GROSS SPEC: NORMAL

## 2018-08-06 LAB — POTASSIUM BLDA-SCNC: 2.63 MMOL/L (ref 3.5–5.3)

## 2018-09-20 ENCOUNTER — CONSULT (OUTPATIENT)
Dept: CARDIOLOGY | Facility: CLINIC | Age: 33
End: 2018-09-20

## 2018-09-20 VITALS
DIASTOLIC BLOOD PRESSURE: 60 MMHG | HEIGHT: 65 IN | WEIGHT: 293 LBS | BODY MASS INDEX: 48.82 KG/M2 | HEART RATE: 85 BPM | SYSTOLIC BLOOD PRESSURE: 118 MMHG

## 2018-09-20 DIAGNOSIS — E11.65 TYPE 2 DIABETES MELLITUS WITH HYPERGLYCEMIA, WITHOUT LONG-TERM CURRENT USE OF INSULIN (HCC): ICD-10-CM

## 2018-09-20 DIAGNOSIS — Z01.810 PREOP CARDIOVASCULAR EXAM: Primary | ICD-10-CM

## 2018-09-20 DIAGNOSIS — I10 ESSENTIAL HYPERTENSION: ICD-10-CM

## 2018-09-20 PROCEDURE — 99242 OFF/OP CONSLTJ NEW/EST SF 20: CPT | Performed by: INTERNAL MEDICINE

## 2018-09-20 PROCEDURE — 93000 ELECTROCARDIOGRAM COMPLETE: CPT | Performed by: INTERNAL MEDICINE

## 2018-09-20 RX ORDER — AMLODIPINE BESYLATE 5 MG/1
5 TABLET ORAL DAILY
Refills: 5 | COMMUNITY
Start: 2018-07-20 | End: 2021-07-05

## 2018-09-20 NOTE — PROGRESS NOTES
[facility] at [facility]  Consultation H&P  Deepika Gonzalez  1985  [phone]  VISIT DATE:  09/20/18    PCP: Matthew Suarez MD  [address] [facility] [address] [address]    IDENTIFICATION: A 33 y.o. female from [place], [place]    CC:  Chief Complaint   Patient presents with   • Surgical Clearance   • Dizziness   • Shortness of Breath   • Irregular Heart Beat   • Edema       PROBLEM LIST:  1. HTN  2. HLD, on atorvastatin  1. 7/18/18  TG 53 HDL 60 LDL 87  3. PCOS  4. Pseudotumor cerebri  5. Chronic fatigue  6. GERD  7. Seasonal allergic rhinitis  8. Depression/anxiety  9. Surgical history:  1. Ear tubes x3 1995  2. Endoscopy 2005    Allergies  No Known Allergies    Current Medications    Current Outpatient Prescriptions:   •  acetaZOLAMIDE (DIAMOX) 500 MG capsule, Take 500 mg by mouth Daily. Only takes daily vs BID, Disp: , Rfl:   •  amLODIPine (NORVASC) 5 MG tablet, Take 5 mg by mouth Daily., Disp: , Rfl: 5  •  busPIRone (BUSPAR) 15 MG tablet, Take 15 mg by mouth 2 (Two) Times a Day., Disp: , Rfl:   •  hydrochlorothiazide (HYDRODIURIL) 25 MG tablet, Take 25 mg by mouth Daily., Disp: , Rfl:   •  metFORMIN (GLUCOPHAGE) 500 MG tablet, Take 500 mg by mouth 3 (Three) Times a Day., Disp: , Rfl:   •  Multiple Vitamin (MULTI-DAY PO), Take 1 tablet by mouth Daily., Disp: , Rfl:      History of Present Illness   HPI  This is a 33 year old female w the above mentioned PM who presents for consult from Roma Delgadillo PA-C for evaluation of cardiac clearance for sleeve gastrectomy.    Pt denies any chest pain, dyspnea at rest, dyspnea on exertion, orthopnea, PND, palpitations, lower extremity edema, or claudication. Pt denies history of CHF, DVT, PE, MI, CVA, TIA, or rheumatic fever.       ROS  Review of Systems   Constitution: Positive for malaise/fatigue. Negative for chills, fever, weakness, night sweats, weight gain and weight loss.   HENT: Negative for hearing loss and  nosebleeds.    Eyes: Negative for blurred vision, vision loss in left eye, vision loss in right eye, visual disturbance and visual halos.   Cardiovascular: Negative for chest pain, claudication, cyanosis, dyspnea on exertion, irregular heartbeat, leg swelling, near-syncope, orthopnea, palpitations, paroxysmal nocturnal dyspnea and syncope.   Respiratory: Positive for snoring. Negative for cough, hemoptysis, shortness of breath and wheezing.    Endocrine: Negative for cold intolerance, heat intolerance, polydipsia, polyphagia and polyuria.   Hematologic/Lymphatic: Negative for adenopathy and bleeding problem. Does not bruise/bleed easily.   Skin: Negative for dry skin, poor wound healing and rash.   Musculoskeletal: Positive for joint pain. Negative for falls, joint swelling, muscle cramps, muscle weakness, myalgias and neck pain.   Gastrointestinal: Positive for heartburn. Negative for bloating, abdominal pain, change in bowel habit, bowel incontinence, constipation, diarrhea, dysphagia, excessive appetite, hematemesis, hematochezia, jaundice, melena, nausea and vomiting.   Genitourinary: Negative for bladder incontinence, dysuria, flank pain, hematuria, hesitancy and nocturia.   Neurological: Negative for aphonia, excessive daytime sleepiness, dizziness, focal weakness, headaches, light-headedness, loss of balance, seizures, sensory change, tremors and vertigo.   Psychiatric/Behavioral: Negative for altered mental status, depression, memory loss, substance abuse and suicidal ideas. The patient is not nervous/anxious.    All other systems reviewed and are negative.      SOCIAL HX  Social History     Social History   • Marital status: Single     Spouse name: N/A   • Number of children: N/A   • Years of education: Masters Degree      Occupational History   •       Social History Main Topics   • Smoking status: Never Smoker   • Smokeless tobacco: Never Used   • Alcohol use No   • Drug use: No   •  "Sexual activity: Defer      Comment: no hormones     Other Topics Concern   • Not on file     Social History Narrative    Lives in [facility] with mother.  Works in , travels a lot.        FAMILY HX  Family History   Problem Relation Age of Onset   • Obesity Mother    • Hypertension Mother    • Sleep apnea Mother    • Obesity Father    • Hypertension Father    • Heart disease Father    • Sleep apnea Father    • Heart disease Maternal Grandmother    • Hypertension Maternal Grandmother    • Diabetes Paternal Grandmother    • Hypertension Paternal Grandfather        Vitals:    09/20/18 1345   BP: 118/60   BP Location: Right arm   Patient Position: Sitting   Pulse: 85   Weight: (!) 146 kg (322 lb)   Height: 165.1 cm (65\")       PHYSICAL EXAMINATION:  Physical Exam   Constitutional: She is oriented to person, place, and time. She appears well-developed and well-nourished. No distress.   HENT:   Head: Normocephalic and atraumatic.   Nose: Nose normal.   Mouth/Throat: Uvula is midline, oropharynx is clear and moist and mucous membranes are normal.   Eyes: Pupils are equal, round, and reactive to light. Conjunctivae and EOM are normal. No scleral icterus.   Neck: Normal range of motion. Neck supple. No hepatojugular reflux and no JVD present. Carotid bruit is not present. No tracheal deviation present. No thyromegaly present.   Cardiovascular: Normal rate, regular rhythm, S1 normal, S2 normal, intact distal pulses and normal pulses.  PMI is not displaced.  Exam reveals no gallop, no distant heart sounds, no friction rub, no midsystolic click and no opening snap.    No murmur heard.  Pulses:       Radial pulses are 2+ on the right side, and 2+ on the left side.        Dorsalis pedis pulses are 2+ on the right side, and 2+ on the left side.        Posterior tibial pulses are 2+ on the right side, and 2+ on the left side.   Pulmonary/Chest: Effort normal and breath sounds normal. She has no wheezes. She has no rhonchi. " She has no rales.   Abdominal: Soft. Bowel sounds are normal. She exhibits no mass. There is no tenderness. There is no guarding.   Musculoskeletal: She exhibits no edema or tenderness.   Lymphadenopathy:     She has no cervical adenopathy.   Neurological: She is alert and oriented to person, place, and time.   Skin: Skin is warm, dry and intact. No rash noted. No cyanosis or erythema. Nails show no clubbing.   Psychiatric: She has a normal mood and affect. Her behavior is normal.   Nursing note and vitals reviewed.      Diagnostic Data:    ECG 12 Lead  Date/Time: 9/20/2018 2:04 PM  Performed by: AMELIA STEWARD  Authorized by: AMELIA STEWARD   Rhythm: sinus rhythm  BPM: 85  Clinical impression: normal ECG          Lab Results   Component Value Date    CHLPL 158 07/18/2018    TRIG 53 07/18/2018    HDL 60 07/18/2018     Lab Results   Component Value Date    BUN 11 07/18/2018    CREATININE 0.92 07/18/2018     07/18/2018    K 4.3 07/18/2018     (H) 07/18/2018    CO2 18 (L) 07/18/2018     No results found for: HGBA1C  Lab Results   Component Value Date    HGB 12.0 07/18/2018    HCT 36.0 07/18/2018     07/18/2018       ASSESSMENT:   Diagnosis Plan   1. Preop cardiovascular exam     2. Essential hypertension     3. Type 2 diabetes mellitus with hyperglycemia, without long-term current use of insulin (CMS/[facility])           PLAN:  1. Patient is acceptable cardiac risk for sleeve gastrectomy with Dr. Martinez.  2. BP acceptable, cont antihypertensives  3. Patient counseled on continuing low carb diet, and that cardiac risk increases with A1c's greater than 7    Scribed for Amelia Steward MD by Dotty Orozco PA-C. 9/20/2018  2:02 PM  I, Amelia Steward MD, personally performed the services described in this documentation as scribed by the above named individual in my presence, and it is both accurate and complete.  9/20/2018  2:11 PM    Amelia Steward MD, [facility]

## 2018-10-26 DIAGNOSIS — R06.2 WHEEZING: Primary | ICD-10-CM

## 2018-11-07 ENCOUNTER — HOSPITAL ENCOUNTER (OUTPATIENT)
Dept: PULMONOLOGY | Facility: HOSPITAL | Age: 33
Discharge: HOME | End: 2018-11-07
Admitting: PHYSICIAN ASSISTANT

## 2018-11-07 DIAGNOSIS — R06.2 WHEEZING: ICD-10-CM

## 2018-11-07 PROCEDURE — 94010 BREATHING CAPACITY TEST: CPT

## 2018-11-07 PROCEDURE — 94729 DIFFUSING CAPACITY: CPT | Performed by: INTERNAL MEDICINE

## 2018-11-07 PROCEDURE — 94010 BREATHING CAPACITY TEST: CPT | Performed by: INTERNAL MEDICINE

## 2018-11-07 PROCEDURE — 94729 DIFFUSING CAPACITY: CPT

## 2018-11-07 PROCEDURE — 94727 GAS DIL/WSHOT DETER LNG VOL: CPT

## 2018-11-07 PROCEDURE — 94727 GAS DIL/WSHOT DETER LNG VOL: CPT | Performed by: INTERNAL MEDICINE

## 2019-01-25 ENCOUNTER — HOSPITAL ENCOUNTER (OUTPATIENT)
Dept: OTHER | Facility: HOSPITAL | Age: 34
Discharge: HOME | End: 2019-01-25

## 2019-01-31 ENCOUNTER — HOSPITAL ENCOUNTER (OUTPATIENT)
Dept: GENERAL RADIOLOGY | Facility: HOSPITAL | Age: 34
Discharge: HOME | End: 2019-01-31
Attending: OBSTETRICS & GYNECOLOGY

## 2019-03-11 DIAGNOSIS — R53.83 FATIGUE, UNSPECIFIED TYPE: Primary | ICD-10-CM

## 2019-03-11 DIAGNOSIS — R53.83 FATIGUE, UNSPECIFIED TYPE: ICD-10-CM

## 2019-03-11 DIAGNOSIS — R06.00 DYSPNEA, UNSPECIFIED TYPE: ICD-10-CM

## 2019-03-18 ENCOUNTER — HOSPITAL ENCOUNTER (OUTPATIENT)
Dept: GENERAL RADIOLOGY | Facility: HOSPITAL | Age: 34
Discharge: HOME | End: 2019-03-18

## 2019-03-18 LAB
ALBUMIN SERPL-MCNC: 4 G/DL (ref 3.5–5)
ALBUMIN/GLOB SERPL: 1 {RATIO} (ref 1.4–2.6)
ALP SERPL-CCNC: 66 U/L (ref 42–98)
ALT SERPL-CCNC: 19 U/L (ref 10–40)
ANION GAP SERPL CALC-SCNC: 19 MMOL/L (ref 8–19)
AST SERPL-CCNC: 18 U/L (ref 15–50)
BASOPHILS # BLD AUTO: 0.08 10*3/UL (ref 0–0.2)
BASOPHILS NFR BLD AUTO: 0.7 % (ref 0–3)
BILIRUB SERPL-MCNC: 0.62 MG/DL (ref 0.2–1.3)
BUN SERPL-MCNC: 15 MG/DL (ref 5–25)
BUN/CREAT SERPL: 19 {RATIO} (ref 6–20)
CALCIUM SERPL-MCNC: 8.9 MG/DL (ref 8.7–10.4)
CHLORIDE SERPL-SCNC: 98 MMOL/L (ref 99–111)
CONV ABS IMM GRAN: 0.03 10*3/UL (ref 0–0.2)
CONV CO2: 20 MMOL/L (ref 22–32)
CONV IMMATURE GRAN: 0.3 % (ref 0–1.8)
CONV TOTAL PROTEIN: 8.2 G/DL (ref 6.3–8.2)
CREAT UR-MCNC: 0.81 MG/DL (ref 0.5–0.9)
DEPRECATED RDW RBC AUTO: 53.6 FL (ref 36.4–46.3)
EOSINOPHIL # BLD AUTO: 0.17 10*3/UL (ref 0–0.7)
EOSINOPHIL # BLD AUTO: 1.5 % (ref 0–7)
ERYTHROCYTE [DISTWIDTH] IN BLOOD BY AUTOMATED COUNT: 17.4 % (ref 11.7–14.4)
GFR SERPLBLD BASED ON 1.73 SQ M-ARVRAT: >60 ML/MIN/{1.73_M2}
GLOBULIN UR ELPH-MCNC: 4.2 G/DL (ref 2–3.5)
GLUCOSE SERPL-MCNC: 81 MG/DL (ref 65–99)
HBA1C MFR BLD: 12.5 G/DL (ref 12–16)
HCT VFR BLD AUTO: 38.1 % (ref 37–47)
LYMPHOCYTES # BLD AUTO: 3.09 10*3/UL (ref 1–5)
MCH RBC QN AUTO: 27.8 PG (ref 27–31)
MCHC RBC AUTO-ENTMCNC: 32.8 G/DL (ref 33–37)
MCV RBC AUTO: 84.7 FL (ref 81–99)
MONOCYTES # BLD AUTO: 0.53 10*3/UL (ref 0.2–1.2)
MONOCYTES NFR BLD AUTO: 4.7 % (ref 3–10)
NEUTROPHILS # BLD AUTO: 7.29 10*3/UL (ref 2–8)
NEUTROPHILS NFR BLD AUTO: 65.2 % (ref 30–85)
NRBC CBCN: 0 % (ref 0–0.7)
OSMOLALITY SERPL CALC.SUM OF ELEC: 278 MOSM/KG (ref 273–304)
PLATELET # BLD AUTO: 439 10*3/UL (ref 130–400)
PMV BLD AUTO: 8.6 FL (ref 9.4–12.3)
POTASSIUM SERPL-SCNC: 2.9 MMOL/L (ref 3.5–5.3)
RBC # BLD AUTO: 4.5 10*6/UL (ref 4.2–5.4)
SODIUM SERPL-SCNC: 134 MMOL/L (ref 135–147)
VARIANT LYMPHS NFR BLD MANUAL: 27.6 % (ref 20–45)
WBC # BLD AUTO: 11.19 10*3/UL (ref 4.8–10.8)

## 2019-03-19 ENCOUNTER — CONSULT (OUTPATIENT)
Dept: BARIATRICS/WEIGHT MGMT | Facility: CLINIC | Age: 34
End: 2019-03-19

## 2019-03-19 VITALS
WEIGHT: 293 LBS | BODY MASS INDEX: 47.09 KG/M2 | TEMPERATURE: 97.9 F | SYSTOLIC BLOOD PRESSURE: 122 MMHG | DIASTOLIC BLOOD PRESSURE: 72 MMHG | OXYGEN SATURATION: 99 % | RESPIRATION RATE: 18 BRPM | HEART RATE: 103 BPM | HEIGHT: 66 IN

## 2019-03-19 DIAGNOSIS — E66.01 MORBID OBESITY WITH BODY MASS INDEX OF 50.0-59.9 IN ADULT (HCC): Primary | ICD-10-CM

## 2019-03-19 PROCEDURE — 99214 OFFICE O/P EST MOD 30 MIN: CPT | Performed by: SURGERY

## 2019-03-19 RX ORDER — PANTOPRAZOLE SODIUM 40 MG/10ML
40 INJECTION, POWDER, LYOPHILIZED, FOR SOLUTION INTRAVENOUS ONCE
Status: CANCELLED | OUTPATIENT
Start: 2019-03-19 | End: 2019-03-19

## 2019-03-19 RX ORDER — SCOLOPAMINE TRANSDERMAL SYSTEM 1 MG/1
1 PATCH, EXTENDED RELEASE TRANSDERMAL CONTINUOUS
Status: CANCELLED | OUTPATIENT
Start: 2019-03-19 | End: 2019-03-22

## 2019-03-19 RX ORDER — SODIUM CHLORIDE 0.9 % (FLUSH) 0.9 %
3-10 SYRINGE (ML) INJECTION AS NEEDED
Status: CANCELLED | OUTPATIENT
Start: 2019-03-19

## 2019-03-19 RX ORDER — SODIUM CHLORIDE, SODIUM LACTATE, POTASSIUM CHLORIDE, CALCIUM CHLORIDE 600; 310; 30; 20 MG/100ML; MG/100ML; MG/100ML; MG/100ML
150 INJECTION, SOLUTION INTRAVENOUS CONTINUOUS
Status: CANCELLED | OUTPATIENT
Start: 2019-03-19

## 2019-03-19 RX ORDER — ACETAMINOPHEN 325 MG/1
650 TABLET ORAL ONCE
Status: CANCELLED | OUTPATIENT
Start: 2019-03-19 | End: 2019-03-19

## 2019-03-19 RX ORDER — CHLORHEXIDINE GLUCONATE 0.12 MG/ML
15 RINSE ORAL ONCE
Status: CANCELLED | OUTPATIENT
Start: 2019-03-19

## 2019-03-19 RX ORDER — SODIUM CHLORIDE 0.9 % (FLUSH) 0.9 %
3 SYRINGE (ML) INJECTION EVERY 12 HOURS SCHEDULED
Status: CANCELLED | OUTPATIENT
Start: 2019-03-19

## 2019-03-19 RX ORDER — POTASSIUM CHLORIDE 20 MEQ/1
20 TABLET, EXTENDED RELEASE ORAL 2 TIMES DAILY
Qty: 40 TABLET | Refills: 0 | Status: SHIPPED | OUTPATIENT
Start: 2019-03-19 | End: 2019-03-31 | Stop reason: HOSPADM

## 2019-03-20 ENCOUNTER — APPOINTMENT (OUTPATIENT)
Dept: PREADMISSION TESTING | Facility: HOSPITAL | Age: 34
End: 2019-03-20

## 2019-03-20 PROCEDURE — 93010 ELECTROCARDIOGRAM REPORT: CPT | Performed by: INTERNAL MEDICINE

## 2019-03-20 PROCEDURE — 93005 ELECTROCARDIOGRAM TRACING: CPT

## 2019-03-20 NOTE — PAT
Patient had labs (CBC, BMP) DONE 3/18/19 AND ON CHART-  PT POTASSIUM WAS LOW SO MIGHT WANT TO REPEAT DOS.    PATIENT JUST HAD LABS SO DID NOT WANT TO BE STUCK AGAIN SO PLEASE DO T&S WITH BLOOD PERMIT (TOO EARLY FOR PAT) AND A1C AND POTASSIUM DOS (IF NEEDED).          Patient states that dr peters addressed potassium and prescribed patient oral potassium before surgery.

## 2019-03-25 NOTE — PROGRESS NOTES
"[facility] BARIATRIC SURGERY  [address]  [facility] [address] [phone]      Patient  Name:  Deepika Gonzalez  :  1985      Date of Visit: 3/19/19    Chief Complaint:  weight gain; unable to maintain weight loss.  Preop sleeve gastrectomy    History of Present Illness:  Deepika Gonzalez is a 33 y.o. female who presents today for evaluation, education and consultation regarding weight loss surgery.  Since last seen 2018 she has lost 12 pounds.The patient returns for final visit prior to LSG.  Original intake evaluation Roma Delgadillo PA-C dated 2018 reviewed.  From her evaluation:    \"GI history: Has had reflux on PPI in the past, symptoms have improved and is off PPI now. No recent antacid use. No other GI complaints. No h/o h pylori.  EGD  showed evidence of reflux- per patient.  Still has GB.\"    33-year-old super morbidly obese black female from [place].  Her mother is with her today.  She is aware at least theoretically, that laparoscopic sleeve gastrectomy may be a first stage procedure.  I did her friend sleeve gastrectomy.  She denies gallbladder symptoms and has minimal GE reflux symptoms.  She denies personal or family history of bleeding or coagulopathies.  Never pregnant.     The patient has had issues with morbid obesity for years and only temporary success with non-surgical methods of weight loss.  The patient is seeking LSG to help with the morbid obesity related conditions of microcytic erythrocytes, thrombocytosis, abnormal pulmonary function tests with restrictive lung disease, hyperlipidemia, anxiety, fatigue, GE reflux disease, hypertension, insulin resistance, lower extremity edema, paresthesias, PCO S, pseudotumor cerebri.          Past Medical History:   Diagnosis Date   • Abnormal PFTs     restrictive   • Allergic rhinitis    • Anxiety     seeing therapist   • Dental crown present     lower left    • Dyspepsia    • Fatigue "    • GERD (gastroesophageal reflux disease)     has been on PPI in the past, improved recently- no longer on antacids. EGD 2005, no h/o h pylori.    • History of headache     sinus   • History of palpitations     due to caffeine inake in the past    • HTN (hypertension)    • Hypokalemia     2.9   • Insulin resistance    • Lower extremity edema     water pill daily    • Microcytic red blood cells    • Morbid obesity with BMI of 50.0-59.9, adult (CMS/HCC)    • Paresthesias     of hands and feet, thought to be 2/2 diamox   • PCOS (polycystic ovarian syndrome)     on metformin for it    • Pseudotumor cerebri     followed by opthalmologist- pending neurology specialist.  Has HA, on diamox.    • Seasonal allergies    • Thrombocytosis (CMS/HCC)    • Wears glasses    • Wears glasses    • Wheezing 2008    in the past due to allergies, improved now. No longer uses inhaler.      Past Surgical History:   Procedure Laterality Date   • EAR TUBES  1995    x 3   • ENDOSCOPY  2005    Dr Hay in [facility],  showed reflux   • ENDOSCOPY  8/2/2018    Procedure: ESOPHAGOGASTRODUODENOSCOPY WITH BIOPSY;  Surgeon: Daquan Martinez MD;  Location: [facility] ENDOSCOPY;  Service: Gastroenterology       No Known Allergies    Current Outpatient Medications:   •  acetaZOLAMIDE (DIAMOX) 500 MG capsule, Take 500 mg by mouth Daily., Disp: , Rfl:   •  amLODIPine (NORVASC) 5 MG tablet, Take 5 mg by mouth Daily., Disp: , Rfl: 5  •  busPIRone (BUSPAR) 15 MG tablet, Take 15 mg by mouth 2 (Two) Times a Day As Needed., Disp: , Rfl:   •  Cholecalciferol (VITAMIN D3) 5000 units capsule capsule, Take 5,000 Units by mouth Daily., Disp: , Rfl:   •  hydrochlorothiazide (HYDRODIURIL) 25 MG tablet, Take 25 mg by mouth Daily., Disp: , Rfl:   •  metFORMIN (GLUCOPHAGE) 500 MG tablet, Take 500 mg by mouth 3 (Three) Times a Day., Disp: , Rfl:   •  Multiple Vitamin (MULTI-DAY PO), Take 1 tablet by mouth Daily., Disp: , Rfl:   •  apixaban (ELIQUIS) 2.5 MG tablet tablet,  Take 1 tablet by mouth Every 12 (Twelve) Hours for 42 doses. (Patient taking differently: Take 2.5 mg by mouth Every 12 (Twelve) Hours. After surgery), Disp: 42 tablet, Rfl: 0  •  potassium chloride (K-DUR,KLOR-CON) 20 MEQ CR tablet, Take 1 tablet by mouth 2 (Two) Times a Day., Disp: 40 tablet, Rfl: 0    Social History     Socioeconomic History   • Marital status: Single     Spouse name: Not on file   • Number of children: Not on file   • Years of education: Masters Degree    • Highest education level: Not on file   Occupational History   • Occupation:    Tobacco Use   • Smoking status: Never Smoker   • Smokeless tobacco: Never Used   Substance and Sexual Activity   • Alcohol use: Yes     Comment: very rarely    • Drug use: No   • Sexual activity: Defer     Comment: no hormones   Social History Narrative    Lives in [facility] with mother.  Works in , travels a lot.      Family History   Problem Relation Age of Onset   • Obesity Mother    • Hypertension Mother    • Sleep apnea Mother    • Obesity Father    • Hypertension Father    • Heart disease Father    • Sleep apnea Father    • Heart disease Maternal Grandmother    • Hypertension Maternal Grandmother    • Diabetes Paternal Grandmother    • Hypertension Paternal Grandfather        Review of Systems   Constitutional: Positive for fatigue. Negative for chills, diaphoresis, fever and unexpected weight loss.   HENT: Negative for congestion and facial swelling.    Eyes: Negative for blurred vision, double vision and discharge.   Respiratory: Negative for chest tightness, shortness of breath and stridor.    Cardiovascular: Negative for chest pain, palpitations and leg swelling.   Gastrointestinal: Negative for blood in stool.   Endocrine: Negative for polydipsia.   Genitourinary: Negative for hematuria.   Musculoskeletal: Positive for arthralgias.   Skin: Negative for color change.   Allergic/Immunologic: Negative for immunocompromised  state.   Neurological: Negative for confusion.   Psychiatric/Behavioral: Negative for self-injury.       I have reviewed the ROS and confirm that it's accurate today.    Physical Exam:  Vital Signs:  Weight: (!) 141 kg (310 lb)   Body mass index is 50.8 kg/m².  Temp: 97.9 °F (36.6 °C)   Heart Rate: 103   BP: 122/72     Physical Exam   Constitutional: She is oriented to person, place, and time. She appears well-developed and well-nourished.   HENT:   Head: Normocephalic and atraumatic.   Nose: Nose normal.   Eyes: Conjunctivae and EOM are normal. Pupils are equal, round, and reactive to light.   Neck: Normal range of motion. Neck supple. Carotid bruit is not present. No tracheal deviation present. No thyromegaly present.   Cardiovascular: Normal rate, regular rhythm and normal heart sounds.   Pulmonary/Chest: Effort normal and breath sounds normal. No respiratory distress.   Abdominal: Soft. She exhibits no distension. There is no hepatosplenomegaly. There is no tenderness.   Musculoskeletal: Normal range of motion. She exhibits no edema or deformity.   Neurological: She is alert and oriented to person, place, and time. No cranial nerve deficit. Coordination normal.   Skin: Skin is warm and dry. No rash noted.   Psychiatric: She has a normal mood and affect. Her behavior is normal. Judgment and thought content normal.   Vitals reviewed.      Patient Active Problem List   Diagnosis   • PCOS (polycystic ovarian syndrome)   • Pseudotumor cerebri   • HTN (hypertension)   • Insulin resistance   • Fatigue   • Morbid obesity with BMI of 50.0-59.9, adult (CMS/[facility])   • Dyspepsia   • Allergic rhinitis   • Lower extremity edema   • Anxiety   • Wheezing   • Paresthesias   • GERD (gastroesophageal reflux disease)   • Gastroesophageal reflux disease   • Morbid obesity with body mass index of 50.0-59.9 in adult (CMS/[facility])       Assessment:    Deepika Gonzalez is a 33 y.o. year old female with medically complicated  obesity.    Weight loss surgery is deemed medically necessary given the following obesity related comorbidities including microcytic erythrocytes, thrombocytosis, abnormal pulmonary function tests with restrictive lung disease, hyperlipidemia, anxiety, fatigue, GE reflux disease, hypertension, insulin resistance, lower extremity edema, paresthesias, PCO S, pseudotumor cerebri with current Weight: (!) 141 kg (310 lb) and Body mass index is 50.8 kg/m²..    Encounter Diagnosis   Name Primary?   • Morbid obesity with body mass index of 50.0-59.9 in adult (CMS/[facility]) Yes      Patient is aware that surgery is a tool, and that weight loss is not guaranteed but only seen in the context of appropriate use, follow up and exercise.    The patient was present for an approximately a 2.5 hour discussion of the purpose of weight loss surgery, how WLS is a tool to assist in achieving weight loss goals, the most common complications and how best to avoid them, and the strategies for short and long term weight loss.  Ample opportunity to discuss questions was available both in group and during the time of individual examination.    I reviewed her [place] report which is negative.  Chest x-ray dated 3/18/2019 showing no active process and dextrocurvature of the thoracic spine.  EKG dated 9/20/2018 showing sinus rhythm and possible left atrial enlargement.  Labs dated 3/18/2019 with a white count of 11.2 microcytic indices platelet count 439.  Of note H&H 12.5 and 38.1.  CMP dated 3/18/2019 showing a sodium of 134 potassium 2.9 chloride of 98 CO2 of 20 otherwise unremarkable.  Psychological evaluation Dr. Neri Noonan dated 7/18/2018 showing she is an appropriate candidate.  Dietitian evaluation dated 7/18/2018 Lucero Ragland.  Primary provider evaluation and clearance for appropriate candidate Matthew Rubio  7/18.  EGD by me dated 8-18 showing an unremarkable exam Z line 38 cm, pathology of the antrum negative for H. pylori, distal  "esophageal biopsies consistent with reflux esophagitis.  Helicobacter pylori breath test negative dated 1/25/2019.  Pulmonary function tests dated 11/7/2018 consistent with restrictive lung disease.  Etiology clearance dated 9/20/2018 Dr. Steward.  Normal TSH, lipid panel dated 7/18/2018.  Please see scanned records that I have reviewed and signed off on today.  All of this in addition to the patient's unique history and exam has been taken into consideration in determining their appropriate candidacy for weight loss surgery.    Complications  of laparoscopic/possible robotic gastric sleeve were discussed. The patient is well aware of the potential complications of surgery that include but not limited to bleeding, infections, deep venous thrombosis, pulmonary embolism, pulmonary complications such as pneumonia, cardiac events, hernias, small bowel obstruction, damage to the spleen or other organs, bowel injury, disfiguring scars, failure to lose weight, need for additional surgery, conversion to an open procedure, and death. Patient is also aware of complications which apply in this particular procedure that can include but are not limited to a \"leak\" at the staple line which in some instances may require conversion to gastric bypass.    The patient is aware if a hiatal hernia is encountered, it likely will be repaired.  R/B/A Rx to hiatal hernia repair were discussed as outlined in our long consent form.  Briefly risks in addition to those for LSG include recurrent hernia, NOA, dysphagia, esophageal injury, pneumothorax, injury to the vagus nerves, injury to the thoracic duct, aorta or vena cava.    I discussed avoiding all tobacco products and second hand smoke at least 2 weeks pre-operatively and 6 weeks post-operatively to minimize the risk of sleeve leak.  This included discussing the importance of avoiding even secondhand smoke as the risk of leak is increased.  Examples discussed:  I made it very clear that " the patient understands they should avoid even riding in a car where someone has previously smoked in the last 2 weeks, living in a house where someone smokes (even if it's in a separate room/patio/attached garage, etc.) we discussed that they should not have a conversation with a group of people who are smoking even if it's outside.  They can be around wood burning fires and barbecue.  I told them I do not know if marijuana has a same effects but my overall recommendation is to avoid it for 2 weeks prior in 6 weeks after surgery.  They also are aware that nicotine may also increase the risk of leak and I strongly encouraged him to avoid that as well for 2 weeks prior in 6 weeks after surgery.    Discussed the risks, benefits and alternative therapies at great length as outlined in our extensive consent forms, consent videos, and educational teaching process under the direction of the center's .    A copy of the patient's signed informed consent is on file.    R/B/A Rx discussed to postop anticoagulation incl but not limited to bleeding, drug reaction, venothromboembolic events, etc. and agreeable to taking post op  Eliquis 2.5 mg po Q 12 hrs #42    Plan:  Laparoscopic sleeve gastrectomy.  A potassium prescription was sent to her pharmacy        Daquan Martinez MD

## 2019-03-26 ENCOUNTER — ANESTHESIA EVENT (OUTPATIENT)
Dept: PERIOP | Facility: HOSPITAL | Age: 34
End: 2019-03-26

## 2019-03-26 RX ORDER — FAMOTIDINE 10 MG/ML
20 INJECTION, SOLUTION INTRAVENOUS ONCE
Status: CANCELLED | OUTPATIENT
Start: 2019-03-26 | End: 2019-03-26

## 2019-03-26 RX ORDER — SODIUM CHLORIDE 0.9 % (FLUSH) 0.9 %
3 SYRINGE (ML) INJECTION EVERY 12 HOURS SCHEDULED
Status: CANCELLED | OUTPATIENT
Start: 2019-03-26

## 2019-03-26 RX ORDER — SODIUM CHLORIDE, SODIUM LACTATE, POTASSIUM CHLORIDE, CALCIUM CHLORIDE 600; 310; 30; 20 MG/100ML; MG/100ML; MG/100ML; MG/100ML
9 INJECTION, SOLUTION INTRAVENOUS CONTINUOUS
Status: CANCELLED | OUTPATIENT
Start: 2019-03-26

## 2019-03-26 RX ORDER — SODIUM CHLORIDE 0.9 % (FLUSH) 0.9 %
3-10 SYRINGE (ML) INJECTION AS NEEDED
Status: CANCELLED | OUTPATIENT
Start: 2019-03-26

## 2019-03-27 ENCOUNTER — ANESTHESIA (OUTPATIENT)
Dept: PERIOP | Facility: HOSPITAL | Age: 34
End: 2019-03-27

## 2019-03-27 ENCOUNTER — HOSPITAL ENCOUNTER (INPATIENT)
Facility: HOSPITAL | Age: 34
LOS: 4 days | Discharge: HOME | End: 2019-03-31
Attending: SURGERY | Admitting: SURGERY

## 2019-03-27 DIAGNOSIS — E66.01 MORBID OBESITY WITH BODY MASS INDEX OF 50.0-59.9 IN ADULT (HCC): ICD-10-CM

## 2019-03-27 LAB
ABO GROUP BLD: NORMAL
ABO GROUP BLD: NORMAL
B-HCG UR QL: NEGATIVE
BLD GP AB SCN SERPL QL: NEGATIVE
GLUCOSE BLDC GLUCOMTR-MCNC: 138 MG/DL (ref 70–130)
GLUCOSE BLDC GLUCOMTR-MCNC: 145 MG/DL (ref 70–130)
HBA1C MFR BLD: 4.4 % (ref 4.8–5.6)
INTERNAL NEGATIVE CONTROL: NEGATIVE
INTERNAL POSITIVE CONTROL: POSITIVE
Lab: NORMAL
POTASSIUM BLDA-SCNC: 3.6 MMOL/L (ref 3.5–5.3)
RH BLD: POSITIVE
RH BLD: POSITIVE
T&S EXPIRATION DATE: NORMAL

## 2019-03-27 PROCEDURE — 82962 GLUCOSE BLOOD TEST: CPT

## 2019-03-27 PROCEDURE — 86901 BLOOD TYPING SEROLOGIC RH(D): CPT | Performed by: SURGERY

## 2019-03-27 PROCEDURE — 25010000002 DEXAMETHASONE PER 1 MG: Performed by: NURSE ANESTHETIST, CERTIFIED REGISTERED

## 2019-03-27 PROCEDURE — 86901 BLOOD TYPING SEROLOGIC RH(D): CPT

## 2019-03-27 PROCEDURE — 43775 LAP SLEEVE GASTRECTOMY: CPT | Performed by: SURGERY

## 2019-03-27 PROCEDURE — 25010000002 FENTANYL CITRATE (PF) 100 MCG/2ML SOLUTION: Performed by: NURSE ANESTHETIST, CERTIFIED REGISTERED

## 2019-03-27 PROCEDURE — 25010000002 ONDANSETRON PER 1 MG: Performed by: SURGERY

## 2019-03-27 PROCEDURE — 81025 URINE PREGNANCY TEST: CPT | Performed by: ANESTHESIOLOGY

## 2019-03-27 PROCEDURE — 0DB64Z3 EXCISION OF STOMACH, PERCUTANEOUS ENDOSCOPIC APPROACH, VERTICAL: ICD-10-PCS | Performed by: SURGERY

## 2019-03-27 PROCEDURE — 25010000002 CEFAZOLIN PER 500 MG: Performed by: SURGERY

## 2019-03-27 PROCEDURE — 83036 HEMOGLOBIN GLYCOSYLATED A1C: CPT | Performed by: SURGERY

## 2019-03-27 PROCEDURE — 25010000002 NEOSTIGMINE 10 MG/10ML SOLUTION: Performed by: NURSE ANESTHETIST, CERTIFIED REGISTERED

## 2019-03-27 PROCEDURE — 86850 RBC ANTIBODY SCREEN: CPT | Performed by: SURGERY

## 2019-03-27 PROCEDURE — 94799 UNLISTED PULMONARY SVC/PX: CPT

## 2019-03-27 PROCEDURE — 25010000002 HYDROMORPHONE 1 MG/ML SOLUTION: Performed by: SURGERY

## 2019-03-27 PROCEDURE — 88307 TISSUE EXAM BY PATHOLOGIST: CPT | Performed by: SURGERY

## 2019-03-27 PROCEDURE — 25010000002 PROPOFOL 10 MG/ML EMULSION: Performed by: NURSE ANESTHETIST, CERTIFIED REGISTERED

## 2019-03-27 PROCEDURE — 25010000002 DEXAMETHASONE SODIUM PHOSPHATE 10 MG/ML SOLUTION: Performed by: ANESTHESIOLOGY

## 2019-03-27 PROCEDURE — 0DJ08ZZ INSPECTION OF UPPER INTESTINAL TRACT, VIA NATURAL OR ARTIFICIAL OPENING ENDOSCOPIC: ICD-10-PCS | Performed by: SURGERY

## 2019-03-27 PROCEDURE — 25010000002 ONDANSETRON PER 1 MG: Performed by: NURSE ANESTHETIST, CERTIFIED REGISTERED

## 2019-03-27 PROCEDURE — 25010000002 ENOXAPARIN PER 10 MG: Performed by: SURGERY

## 2019-03-27 PROCEDURE — 25010000003 CEFAZOLIN IN DEXTROSE 2-4 GM/100ML-% SOLUTION: Performed by: SURGERY

## 2019-03-27 PROCEDURE — 25010000002 BUPRENORPHINE PER 0.1 MG: Performed by: ANESTHESIOLOGY

## 2019-03-27 PROCEDURE — 86900 BLOOD TYPING SEROLOGIC ABO: CPT | Performed by: SURGERY

## 2019-03-27 PROCEDURE — 86900 BLOOD TYPING SEROLOGIC ABO: CPT

## 2019-03-27 PROCEDURE — 84132 ASSAY OF SERUM POTASSIUM: CPT | Performed by: ANESTHESIOLOGY

## 2019-03-27 DEVICE — REINFORCED INTELLIGENT RELOAD, FOR USE WITH SIGNIA STAPLING SYSTEM
Type: IMPLANTABLE DEVICE | Site: STOMACH | Status: FUNCTIONAL
Brand: TRI-STAPLE 2.0

## 2019-03-27 DEVICE — SEALANT WND FIBRIN TISSEEL VAPOR/HEAT/PREFIL/SYR 10ML: Type: IMPLANTABLE DEVICE | Site: STOMACH | Status: FUNCTIONAL

## 2019-03-27 DEVICE — BLACK REINFORCED INTELLIGENT RELOAD, FOR USE WITH SIGNIA STAPLING SYSTEM
Type: IMPLANTABLE DEVICE | Site: STOMACH | Status: FUNCTIONAL
Brand: TRI-STAPLE 2.0

## 2019-03-27 RX ORDER — ONDANSETRON 2 MG/ML
INJECTION INTRAMUSCULAR; INTRAVENOUS AS NEEDED
Status: DISCONTINUED | OUTPATIENT
Start: 2019-03-27 | End: 2019-03-27 | Stop reason: SURG

## 2019-03-27 RX ORDER — SODIUM CHLORIDE, SODIUM LACTATE, POTASSIUM CHLORIDE, CALCIUM CHLORIDE 600; 310; 30; 20 MG/100ML; MG/100ML; MG/100ML; MG/100ML
150 INJECTION, SOLUTION INTRAVENOUS CONTINUOUS
Status: DISCONTINUED | OUTPATIENT
Start: 2019-03-27 | End: 2019-03-27 | Stop reason: HOSPADM

## 2019-03-27 RX ORDER — CYANOCOBALAMIN 1000 UG/ML
1000 INJECTION, SOLUTION INTRAMUSCULAR; SUBCUTANEOUS ONCE
Status: COMPLETED | OUTPATIENT
Start: 2019-03-28 | End: 2019-03-28

## 2019-03-27 RX ORDER — METOCLOPRAMIDE HYDROCHLORIDE 5 MG/ML
10 INJECTION INTRAMUSCULAR; INTRAVENOUS EVERY 6 HOURS PRN
Status: DISCONTINUED | OUTPATIENT
Start: 2019-03-27 | End: 2019-03-31 | Stop reason: HOSPADM

## 2019-03-27 RX ORDER — SODIUM CHLORIDE 0.9 % (FLUSH) 0.9 %
3 SYRINGE (ML) INJECTION EVERY 12 HOURS SCHEDULED
Status: DISCONTINUED | OUTPATIENT
Start: 2019-03-27 | End: 2019-03-27 | Stop reason: HOSPADM

## 2019-03-27 RX ORDER — SCOLOPAMINE TRANSDERMAL SYSTEM 1 MG/1
1 PATCH, EXTENDED RELEASE TRANSDERMAL CONTINUOUS
Status: DISCONTINUED | OUTPATIENT
Start: 2019-03-27 | End: 2019-03-27 | Stop reason: HOSPADM

## 2019-03-27 RX ORDER — DEXAMETHASONE SODIUM PHOSPHATE 4 MG/ML
INJECTION, SOLUTION INTRA-ARTICULAR; INTRALESIONAL; INTRAMUSCULAR; INTRAVENOUS; SOFT TISSUE AS NEEDED
Status: DISCONTINUED | OUTPATIENT
Start: 2019-03-27 | End: 2019-03-27 | Stop reason: SURG

## 2019-03-27 RX ORDER — FIBRINOGEN HUMAN AND THROMBIN HUMAN 10 ML
KIT TOPICAL AS NEEDED
Status: DISCONTINUED | OUTPATIENT
Start: 2019-03-27 | End: 2019-03-27 | Stop reason: HOSPADM

## 2019-03-27 RX ORDER — MORPHINE SULFATE 4 MG/ML
6 INJECTION, SOLUTION INTRAMUSCULAR; INTRAVENOUS
Status: DISCONTINUED | OUTPATIENT
Start: 2019-03-27 | End: 2019-03-31 | Stop reason: HOSPADM

## 2019-03-27 RX ORDER — GLYCOPYRROLATE 0.2 MG/ML
INJECTION INTRAMUSCULAR; INTRAVENOUS AS NEEDED
Status: DISCONTINUED | OUTPATIENT
Start: 2019-03-27 | End: 2019-03-27 | Stop reason: SURG

## 2019-03-27 RX ORDER — PROMETHAZINE HYDROCHLORIDE 25 MG/ML
12.5 INJECTION, SOLUTION INTRAMUSCULAR; INTRAVENOUS EVERY 6 HOURS PRN
Status: DISCONTINUED | OUTPATIENT
Start: 2019-03-27 | End: 2019-03-31 | Stop reason: HOSPADM

## 2019-03-27 RX ORDER — PROPOFOL 10 MG/ML
VIAL (ML) INTRAVENOUS AS NEEDED
Status: DISCONTINUED | OUTPATIENT
Start: 2019-03-27 | End: 2019-03-27 | Stop reason: SURG

## 2019-03-27 RX ORDER — DIPHENHYDRAMINE HYDROCHLORIDE 50 MG/ML
25 INJECTION INTRAMUSCULAR; INTRAVENOUS EVERY 4 HOURS PRN
Status: DISCONTINUED | OUTPATIENT
Start: 2019-03-27 | End: 2019-03-31 | Stop reason: HOSPADM

## 2019-03-27 RX ORDER — ACETAMINOPHEN 325 MG/1
650 TABLET ORAL ONCE
Status: COMPLETED | OUTPATIENT
Start: 2019-03-27 | End: 2019-03-27

## 2019-03-27 RX ORDER — FENTANYL CITRATE 50 UG/ML
50 INJECTION, SOLUTION INTRAMUSCULAR; INTRAVENOUS
Status: DISCONTINUED | OUTPATIENT
Start: 2019-03-27 | End: 2019-03-27 | Stop reason: HOSPADM

## 2019-03-27 RX ORDER — ALBUTEROL SULFATE 2.5 MG/3ML
2.5 SOLUTION RESPIRATORY (INHALATION) EVERY 4 HOURS PRN
Status: DISCONTINUED | OUTPATIENT
Start: 2019-03-27 | End: 2019-03-31 | Stop reason: HOSPADM

## 2019-03-27 RX ORDER — CEFAZOLIN SODIUM 2 G/100ML
2 INJECTION, SOLUTION INTRAVENOUS ONCE
Status: COMPLETED | OUTPATIENT
Start: 2019-03-27 | End: 2019-03-27

## 2019-03-27 RX ORDER — MAGNESIUM HYDROXIDE 1200 MG/15ML
LIQUID ORAL AS NEEDED
Status: DISCONTINUED | OUTPATIENT
Start: 2019-03-27 | End: 2019-03-27 | Stop reason: HOSPADM

## 2019-03-27 RX ORDER — PROMETHAZINE HYDROCHLORIDE 25 MG/ML
12.5 INJECTION, SOLUTION INTRAMUSCULAR; INTRAVENOUS ONCE AS NEEDED
Status: DISCONTINUED | OUTPATIENT
Start: 2019-03-27 | End: 2019-03-27 | Stop reason: HOSPADM

## 2019-03-27 RX ORDER — HYDRALAZINE HYDROCHLORIDE 20 MG/ML
10 INJECTION INTRAMUSCULAR; INTRAVENOUS
Status: DISCONTINUED | OUTPATIENT
Start: 2019-03-27 | End: 2019-03-31 | Stop reason: HOSPADM

## 2019-03-27 RX ORDER — BUPIVACAINE HYDROCHLORIDE 2.5 MG/ML
INJECTION, SOLUTION EPIDURAL; INFILTRATION; INTRACAUDAL
Status: COMPLETED | OUTPATIENT
Start: 2019-03-27 | End: 2019-03-27

## 2019-03-27 RX ORDER — NALOXONE HCL 0.4 MG/ML
0.4 VIAL (ML) INJECTION
Status: DISCONTINUED | OUTPATIENT
Start: 2019-03-27 | End: 2019-03-31 | Stop reason: HOSPADM

## 2019-03-27 RX ORDER — PANTOPRAZOLE SODIUM 40 MG/10ML
40 INJECTION, POWDER, LYOPHILIZED, FOR SOLUTION INTRAVENOUS
Status: DISCONTINUED | OUTPATIENT
Start: 2019-03-28 | End: 2019-03-27

## 2019-03-27 RX ORDER — ONDANSETRON 2 MG/ML
4 INJECTION INTRAMUSCULAR; INTRAVENOUS EVERY 6 HOURS PRN
Status: DISCONTINUED | OUTPATIENT
Start: 2019-03-27 | End: 2019-03-31 | Stop reason: HOSPADM

## 2019-03-27 RX ORDER — CEFAZOLIN SODIUM IN 0.9 % NACL 3 G/100 ML
3 INTRAVENOUS SOLUTION, PIGGYBACK (ML) INTRAVENOUS EVERY 8 HOURS
Status: COMPLETED | OUTPATIENT
Start: 2019-03-27 | End: 2019-03-28

## 2019-03-27 RX ORDER — LABETALOL HYDROCHLORIDE 5 MG/ML
5 INJECTION, SOLUTION INTRAVENOUS
Status: DISCONTINUED | OUTPATIENT
Start: 2019-03-27 | End: 2019-03-27 | Stop reason: HOSPADM

## 2019-03-27 RX ORDER — LORAZEPAM 1 MG/1
1 TABLET ORAL EVERY 12 HOURS PRN
Status: DISCONTINUED | OUTPATIENT
Start: 2019-03-27 | End: 2019-03-31 | Stop reason: HOSPADM

## 2019-03-27 RX ORDER — HYDRALAZINE HYDROCHLORIDE 20 MG/ML
5 INJECTION INTRAMUSCULAR; INTRAVENOUS
Status: DISCONTINUED | OUTPATIENT
Start: 2019-03-27 | End: 2019-03-27 | Stop reason: HOSPADM

## 2019-03-27 RX ORDER — PROMETHAZINE HYDROCHLORIDE 25 MG/1
25 SUPPOSITORY RECTAL ONCE AS NEEDED
Status: DISCONTINUED | OUTPATIENT
Start: 2019-03-27 | End: 2019-03-27 | Stop reason: HOSPADM

## 2019-03-27 RX ORDER — ONDANSETRON 4 MG/1
4 TABLET, FILM COATED ORAL EVERY 6 HOURS PRN
Status: DISCONTINUED | OUTPATIENT
Start: 2019-03-27 | End: 2019-03-31 | Stop reason: HOSPADM

## 2019-03-27 RX ORDER — HYDROMORPHONE HYDROCHLORIDE 1 MG/ML
0.5 INJECTION, SOLUTION INTRAMUSCULAR; INTRAVENOUS; SUBCUTANEOUS
Status: DISCONTINUED | OUTPATIENT
Start: 2019-03-27 | End: 2019-03-27 | Stop reason: HOSPADM

## 2019-03-27 RX ORDER — NEOSTIGMINE METHYLSULFATE 1 MG/ML
INJECTION, SOLUTION INTRAVENOUS AS NEEDED
Status: DISCONTINUED | OUTPATIENT
Start: 2019-03-27 | End: 2019-03-27 | Stop reason: SURG

## 2019-03-27 RX ORDER — LORAZEPAM 2 MG/ML
0.5 INJECTION INTRAMUSCULAR EVERY 12 HOURS PRN
Status: DISCONTINUED | OUTPATIENT
Start: 2019-03-27 | End: 2019-03-31 | Stop reason: HOSPADM

## 2019-03-27 RX ORDER — SODIUM CHLORIDE, SODIUM LACTATE, POTASSIUM CHLORIDE, CALCIUM CHLORIDE 600; 310; 30; 20 MG/100ML; MG/100ML; MG/100ML; MG/100ML
150 INJECTION, SOLUTION INTRAVENOUS CONTINUOUS
Status: DISCONTINUED | OUTPATIENT
Start: 2019-03-27 | End: 2019-03-31 | Stop reason: HOSPADM

## 2019-03-27 RX ORDER — CHLORHEXIDINE GLUCONATE 0.12 MG/ML
15 RINSE ORAL ONCE
Status: COMPLETED | OUTPATIENT
Start: 2019-03-27 | End: 2019-03-27

## 2019-03-27 RX ORDER — SODIUM CHLORIDE 9 MG/ML
INJECTION, SOLUTION INTRAVENOUS AS NEEDED
Status: DISCONTINUED | OUTPATIENT
Start: 2019-03-27 | End: 2019-03-27 | Stop reason: HOSPADM

## 2019-03-27 RX ORDER — BUPRENORPHINE HYDROCHLORIDE 0.32 MG/ML
INJECTION INTRAMUSCULAR; INTRAVENOUS
Status: COMPLETED | OUTPATIENT
Start: 2019-03-27 | End: 2019-03-27

## 2019-03-27 RX ORDER — PROMETHAZINE HYDROCHLORIDE 25 MG/1
25 TABLET ORAL ONCE AS NEEDED
Status: DISCONTINUED | OUTPATIENT
Start: 2019-03-27 | End: 2019-03-27 | Stop reason: HOSPADM

## 2019-03-27 RX ORDER — SIMETHICONE 80 MG
80 TABLET,CHEWABLE ORAL 4 TIMES DAILY PRN
Status: DISCONTINUED | OUTPATIENT
Start: 2019-03-27 | End: 2019-03-31 | Stop reason: HOSPADM

## 2019-03-27 RX ORDER — SODIUM CHLORIDE 0.9 % (FLUSH) 0.9 %
3-10 SYRINGE (ML) INJECTION AS NEEDED
Status: DISCONTINUED | OUTPATIENT
Start: 2019-03-27 | End: 2019-03-27 | Stop reason: HOSPADM

## 2019-03-27 RX ORDER — PANTOPRAZOLE SODIUM 40 MG/10ML
40 INJECTION, POWDER, LYOPHILIZED, FOR SOLUTION INTRAVENOUS ONCE
Status: COMPLETED | OUTPATIENT
Start: 2019-03-27 | End: 2019-03-27

## 2019-03-27 RX ORDER — HYDROCODONE BITARTRATE AND ACETAMINOPHEN 7.5; 325 MG/1; MG/1
1 TABLET ORAL EVERY 4 HOURS PRN
Status: DISCONTINUED | OUTPATIENT
Start: 2019-03-27 | End: 2019-03-31 | Stop reason: HOSPADM

## 2019-03-27 RX ORDER — FAMOTIDINE 20 MG/1
20 TABLET, FILM COATED ORAL ONCE
Status: DISCONTINUED | OUTPATIENT
Start: 2019-03-27 | End: 2019-03-27 | Stop reason: HOSPADM

## 2019-03-27 RX ORDER — AMLODIPINE BESYLATE 5 MG/1
5 TABLET ORAL DAILY
Status: DISCONTINUED | OUTPATIENT
Start: 2019-03-27 | End: 2019-03-31 | Stop reason: HOSPADM

## 2019-03-27 RX ORDER — MORPHINE SULFATE 4 MG/ML
4 INJECTION, SOLUTION INTRAMUSCULAR; INTRAVENOUS
Status: DISCONTINUED | OUTPATIENT
Start: 2019-03-27 | End: 2019-03-31 | Stop reason: HOSPADM

## 2019-03-27 RX ORDER — DEXAMETHASONE SODIUM PHOSPHATE 10 MG/ML
INJECTION, SOLUTION INTRAMUSCULAR; INTRAVENOUS
Status: COMPLETED | OUTPATIENT
Start: 2019-03-27 | End: 2019-03-27

## 2019-03-27 RX ORDER — SODIUM CHLORIDE AND POTASSIUM CHLORIDE 150; 450 MG/100ML; MG/100ML
125 INJECTION, SOLUTION INTRAVENOUS CONTINUOUS
Status: DISCONTINUED | OUTPATIENT
Start: 2019-03-28 | End: 2019-03-31 | Stop reason: HOSPADM

## 2019-03-27 RX ORDER — LIDOCAINE HYDROCHLORIDE 10 MG/ML
INJECTION, SOLUTION EPIDURAL; INFILTRATION; INTRACAUDAL; PERINEURAL AS NEEDED
Status: DISCONTINUED | OUTPATIENT
Start: 2019-03-27 | End: 2019-03-27 | Stop reason: SURG

## 2019-03-27 RX ORDER — HYDROMORPHONE HYDROCHLORIDE 2 MG/1
2 TABLET ORAL EVERY 4 HOURS PRN
Status: DISCONTINUED | OUTPATIENT
Start: 2019-03-27 | End: 2019-03-31 | Stop reason: HOSPADM

## 2019-03-27 RX ORDER — ATRACURIUM BESYLATE 10 MG/ML
INJECTION, SOLUTION INTRAVENOUS AS NEEDED
Status: DISCONTINUED | OUTPATIENT
Start: 2019-03-27 | End: 2019-03-27 | Stop reason: SURG

## 2019-03-27 RX ORDER — FENTANYL CITRATE 50 UG/ML
INJECTION, SOLUTION INTRAMUSCULAR; INTRAVENOUS AS NEEDED
Status: DISCONTINUED | OUTPATIENT
Start: 2019-03-27 | End: 2019-03-27 | Stop reason: SURG

## 2019-03-27 RX ORDER — PANTOPRAZOLE SODIUM 40 MG/1
40 TABLET, DELAYED RELEASE ORAL
Status: DISCONTINUED | OUTPATIENT
Start: 2019-03-28 | End: 2019-03-31 | Stop reason: HOSPADM

## 2019-03-27 RX ORDER — ACETAZOLAMIDE 500 MG/1
500 CAPSULE, EXTENDED RELEASE ORAL DAILY
Status: DISCONTINUED | OUTPATIENT
Start: 2019-03-27 | End: 2019-03-31 | Stop reason: HOSPADM

## 2019-03-27 RX ORDER — LIDOCAINE HYDROCHLORIDE 10 MG/ML
0.5 INJECTION, SOLUTION EPIDURAL; INFILTRATION; INTRACAUDAL; PERINEURAL ONCE AS NEEDED
Status: COMPLETED | OUTPATIENT
Start: 2019-03-27 | End: 2019-03-27

## 2019-03-27 RX ADMIN — ONDANSETRON 8 MG: 2 INJECTION INTRAMUSCULAR; INTRAVENOUS at 10:21

## 2019-03-27 RX ADMIN — SODIUM CHLORIDE, POTASSIUM CHLORIDE, SODIUM LACTATE AND CALCIUM CHLORIDE 150 ML/HR: 600; 310; 30; 20 INJECTION, SOLUTION INTRAVENOUS at 16:07

## 2019-03-27 RX ADMIN — SODIUM CHLORIDE 3 G: 9 INJECTION, SOLUTION INTRAVENOUS at 17:56

## 2019-03-27 RX ADMIN — SCOPALAMINE 1 PATCH: 1 PATCH, EXTENDED RELEASE TRANSDERMAL at 08:47

## 2019-03-27 RX ADMIN — LIDOCAINE HYDROCHLORIDE 60 MG: 10 INJECTION, SOLUTION EPIDURAL; INFILTRATION; INTRACAUDAL; PERINEURAL at 09:42

## 2019-03-27 RX ADMIN — SIMETHICONE CHEW TAB 80 MG 80 MG: 80 TABLET ORAL at 12:22

## 2019-03-27 RX ADMIN — SIMETHICONE CHEW TAB 80 MG 80 MG: 80 TABLET ORAL at 20:14

## 2019-03-27 RX ADMIN — METOPROLOL TARTRATE 2.5 MG: 5 INJECTION, SOLUTION INTRAVENOUS at 10:45

## 2019-03-27 RX ADMIN — SODIUM CHLORIDE, POTASSIUM CHLORIDE, SODIUM LACTATE AND CALCIUM CHLORIDE: 600; 310; 30; 20 INJECTION, SOLUTION INTRAVENOUS at 10:25

## 2019-03-27 RX ADMIN — FENTANYL CITRATE 150 MCG: 50 INJECTION, SOLUTION INTRAMUSCULAR; INTRAVENOUS at 09:42

## 2019-03-27 RX ADMIN — HYDROMORPHONE HYDROCHLORIDE 2 MG: 2 TABLET ORAL at 23:05

## 2019-03-27 RX ADMIN — FENTANYL CITRATE 50 MCG: 50 INJECTION INTRAMUSCULAR; INTRAVENOUS at 11:40

## 2019-03-27 RX ADMIN — ATRACURIUM BESYLATE 50 MG: 10 INJECTION, SOLUTION INTRAVENOUS at 09:42

## 2019-03-27 RX ADMIN — LIDOCAINE HYDROCHLORIDE 0.5 ML: 10 INJECTION, SOLUTION EPIDURAL; INFILTRATION; INTRACAUDAL; PERINEURAL at 08:45

## 2019-03-27 RX ADMIN — BUPRENORPHINE HYDROCHLORIDE 0.3 MG: 0.32 INJECTION INTRAMUSCULAR; INTRAVENOUS at 09:45

## 2019-03-27 RX ADMIN — SODIUM CHLORIDE, POTASSIUM CHLORIDE, SODIUM LACTATE AND CALCIUM CHLORIDE: 600; 310; 30; 20 INJECTION, SOLUTION INTRAVENOUS at 08:50

## 2019-03-27 RX ADMIN — DEXAMETHASONE SODIUM PHOSPHATE 4 MG: 10 INJECTION INTRAMUSCULAR; INTRAVENOUS at 09:45

## 2019-03-27 RX ADMIN — GLYCOPYRROLATE 0.1 MG: 0.2 INJECTION, SOLUTION INTRAMUSCULAR; INTRAVENOUS at 10:49

## 2019-03-27 RX ADMIN — HYDROMORPHONE HYDROCHLORIDE 1 MG: 1 INJECTION, SOLUTION INTRAMUSCULAR; INTRAVENOUS; SUBCUTANEOUS at 14:03

## 2019-03-27 RX ADMIN — BUPIVACAINE HYDROCHLORIDE 60 ML: 2.5 INJECTION, SOLUTION EPIDURAL; INFILTRATION; INTRACAUDAL; PERINEURAL at 09:45

## 2019-03-27 RX ADMIN — PANTOPRAZOLE SODIUM 40 MG: 40 INJECTION, POWDER, FOR SOLUTION INTRAVENOUS at 08:45

## 2019-03-27 RX ADMIN — ONDANSETRON 4 MG: 2 INJECTION INTRAMUSCULAR; INTRAVENOUS at 14:03

## 2019-03-27 RX ADMIN — ACETAMINOPHEN 650 MG: 325 TABLET, FILM COATED ORAL at 08:45

## 2019-03-27 RX ADMIN — PROPOFOL 200 MG: 10 INJECTION, EMULSION INTRAVENOUS at 09:42

## 2019-03-27 RX ADMIN — CHLORHEXIDINE GLUCONATE 0.12% ORAL RINSE 15 ML: 1.2 LIQUID ORAL at 08:45

## 2019-03-27 RX ADMIN — METOPROLOL TARTRATE 2.5 MG: 5 INJECTION, SOLUTION INTRAVENOUS at 10:10

## 2019-03-27 RX ADMIN — NEOSTIGMINE METHYLSULFATE 2 MG: 1 INJECTION, SOLUTION INTRAVENOUS at 10:49

## 2019-03-27 RX ADMIN — SODIUM CHLORIDE, POTASSIUM CHLORIDE, SODIUM LACTATE AND CALCIUM CHLORIDE 1000 ML: 600; 310; 30; 20 INJECTION, SOLUTION INTRAVENOUS at 08:46

## 2019-03-27 RX ADMIN — DEXAMETHASONE SODIUM PHOSPHATE 4 MG: 4 INJECTION, SOLUTION INTRAMUSCULAR; INTRAVENOUS at 09:42

## 2019-03-27 RX ADMIN — FENTANYL CITRATE 100 MCG: 50 INJECTION, SOLUTION INTRAMUSCULAR; INTRAVENOUS at 10:21

## 2019-03-27 RX ADMIN — CEFAZOLIN SODIUM 2 G: 2 INJECTION, SOLUTION INTRAVENOUS at 09:50

## 2019-03-27 RX ADMIN — HYDROCODONE BITARTRATE AND ACETAMINOPHEN 1 TABLET: 7.5; 325 TABLET ORAL at 16:07

## 2019-03-27 NOTE — ANESTHESIA POSTPROCEDURE EVALUATION
Patient: Deepika Gonzalez    Procedure Summary     Date:  03/27/19 Room / Location:   [facility] 02 /  [facility]    Anesthesia Start:  0938 Anesthesia Stop:  1112    Procedures:       GASTRIC SLEEVE LAPAROSCOPIC (N/A Abdomen)      ESOPHAGOGASTRODUODENOSCOPY (N/A Esophagus) Diagnosis:       Morbid obesity with body mass index of 50.0-59.9 in adult (CMS/HCC)      (Morbid obesity with body mass index of 50.0-59.9 in adult (CMS/[facility]) [E66.01, Z68.43])    Surgeon:  Daquan Martinez MD Provider:  Madhu Dubon MD    Anesthesia Type:  general ASA Status:  3          Anesthesia Type: general  Last vitals  BP   151/97 (03/27/19 1111)   Temp   99.3 °F (37.4 °C) (03/27/19 1111)   Pulse   84 (03/27/19 1111)   Resp   16 (03/27/19 1111)     SpO2   100 % (03/27/19 1111)     Post Anesthesia Care and Evaluation    Patient location during evaluation: PACU  Patient participation: complete - patient participated  Level of consciousness: awake and alert  Pain management: adequate  Airway patency: patent  Anesthetic complications: No anesthetic complications  PONV Status: none  Cardiovascular status: acceptable  Respiratory status: acceptable  Hydration status: acceptable

## 2019-03-27 NOTE — ANESTHESIA PROCEDURE NOTES
Airway  Urgency: elective    Airway not difficult    General Information and Staff    Patient location during procedure: OR    Indications and Patient Condition  Indications for airway management: airway protection    Preoxygenated: yes  MILS not maintained throughout  Mask difficulty assessment: 1 - vent by mask    Final Airway Details  Final airway type: endotracheal airway      Successful airway: ETT  Cuffed: yes   Successful intubation technique: direct laryngoscopy  Endotracheal tube insertion site: oral  Blade: Larry  Blade size: 3  ETT size (mm): 6.5  Cormack-Lehane Classification: grade I - full view of glottis  Placement verified by: chest auscultation and capnometry   Number of attempts at approach: 1

## 2019-03-27 NOTE — ANESTHESIA PREPROCEDURE EVALUATION
Anesthesia Evaluation                  Airway   Mallampati: I  TM distance: >3 FB  Neck ROM: full  No difficulty expected  Dental      Pulmonary    Cardiovascular     ECG reviewed    (+) hypertension,       Neuro/Psych  (+) psychiatric history Depression,     GI/Hepatic/Renal/Endo    (+) morbid obesity, GERD,      Musculoskeletal     Abdominal    Substance History      OB/GYN          Other                        Anesthesia Plan    ASA 3     general   (Tap)  intravenous induction   Anesthetic plan, all risks, benefits, and alternatives have been provided, discussed and informed consent has been obtained with: patient.    Plan discussed with CRNA.

## 2019-03-27 NOTE — ANESTHESIA PROCEDURE NOTES
Peripheral Block      Patient reassessed immediately prior to procedure    Patient location during procedure: OR  Reason for block: at surgeon's request and post-op pain management  Preanesthetic Checklist  Completed: patient identified, site marked, surgical consent, pre-op evaluation, timeout performed, IV checked, risks and benefits discussed and monitors and equipment checked  Prep:  Pt Position: supine  Sterile barriers:cap, gloves, sterile barriers and mask  Prep: ChloraPrep  Patient monitoring: blood pressure monitoring, continuous pulse oximetry and EKG  Procedure  Sedation:yes  Performed under: general  Guidance:ultrasound guided  Images:still images obtained    Laterality:Bilateral  Block Type:TAP  Injection Technique:single-shot  Needle Type:short-bevel and echogenic  Needle Gauge:20 G    Medications Used: buprenorphine (BUPRENEX) injection, 0.3 mg  dexamethasone sodium phosphate injection, 4 mg  bupivacaine PF (MARCAINE) 0.25 % injection, 60 mL  Med admintered at 3/27/2019 9:45 AM  Medications  Comment:Block Injection:  LA dose divided between Right and Left block       Adjuncts:  Decadron 4mg PSF, Buprenex 0.3mg (Per total volume of LA)    Post Assessment  Injection Assessment: negative aspiration for heme, incremental injection and no paresthesia on injection  Patient Tolerance:comfortable throughout block  Complications:no  Additional Notes      Under Ultrasound guidance, a BBraun 4inch 360 degree needle was advanced with Normal Saline hydro dissection of tissue.  The Internal Oblique and Transversus Abdominus muscles where visualized.  At or before the aponeurosis of Internal Oblique, local anesthetic spread was visualized in the Transversus Abdominus Plane. Injection was made incrementally with aspiration every 5 mls.  There was no  intravascular injection,  injection pressure was normal, there was no neural injection, and the procedure was completed without difficulty.  Thank You.

## 2019-03-28 ENCOUNTER — APPOINTMENT (OUTPATIENT)
Dept: GENERAL RADIOLOGY | Facility: HOSPITAL | Age: 34
End: 2019-03-28

## 2019-03-28 LAB
ALBUMIN SERPL-MCNC: 3.53 G/DL (ref 3.2–4.8)
ALBUMIN/GLOB SERPL: 1.4 G/DL (ref 1.5–2.5)
ALP SERPL-CCNC: 52 U/L (ref 25–100)
ALT SERPL W P-5'-P-CCNC: 22 U/L (ref 7–40)
ANION GAP SERPL CALCULATED.3IONS-SCNC: 9 MMOL/L (ref 3–11)
AST SERPL-CCNC: 18 U/L (ref 0–33)
BASOPHILS # BLD AUTO: 0.01 10*3/MM3 (ref 0–0.2)
BASOPHILS NFR BLD AUTO: 0.1 % (ref 0–1)
BILIRUB SERPL-MCNC: 0.6 MG/DL (ref 0.3–1.2)
BUN BLD-MCNC: 9 MG/DL (ref 9–23)
BUN/CREAT SERPL: 12.9 (ref 7–25)
CALCIUM SPEC-SCNC: 8.2 MG/DL (ref 8.7–10.4)
CHLORIDE SERPL-SCNC: 108 MMOL/L (ref 99–109)
CO2 SERPL-SCNC: 20 MMOL/L (ref 20–31)
CREAT BLD-MCNC: 0.7 MG/DL (ref 0.6–1.3)
CYTO UR: NORMAL
DEPRECATED RDW RBC AUTO: 54.8 FL (ref 37–54)
EOSINOPHIL # BLD AUTO: 0.01 10*3/MM3 (ref 0–0.3)
EOSINOPHIL NFR BLD AUTO: 0.1 % (ref 0–3)
ERYTHROCYTE [DISTWIDTH] IN BLOOD BY AUTOMATED COUNT: 17.3 % (ref 11.3–14.5)
GFR SERPL CREATININE-BSD FRML MDRD: 117 ML/MIN/1.73
GLOBULIN UR ELPH-MCNC: 2.6 GM/DL
GLUCOSE BLD-MCNC: 91 MG/DL (ref 70–100)
GLUCOSE BLDC GLUCOMTR-MCNC: 86 MG/DL (ref 70–130)
GLUCOSE BLDC GLUCOMTR-MCNC: 86 MG/DL (ref 70–130)
GLUCOSE BLDC GLUCOMTR-MCNC: 92 MG/DL (ref 70–130)
GLUCOSE BLDC GLUCOMTR-MCNC: 92 MG/DL (ref 70–130)
HCT VFR BLD AUTO: 34.8 % (ref 34.5–44)
HGB BLD-MCNC: 11 G/DL (ref 11.5–15.5)
IMM GRANULOCYTES # BLD AUTO: 0.03 10*3/MM3 (ref 0–0.05)
IMM GRANULOCYTES NFR BLD AUTO: 0.2 % (ref 0–0.6)
IRON 24H UR-MRATE: 29 MCG/DL (ref 50–175)
LAB AP CASE REPORT: NORMAL
LAB AP CLINICAL INFORMATION: NORMAL
LYMPHOCYTES # BLD AUTO: 2.05 10*3/MM3 (ref 0.6–4.8)
LYMPHOCYTES NFR BLD AUTO: 15.1 % (ref 24–44)
MCH RBC QN AUTO: 27.2 PG (ref 27–31)
MCHC RBC AUTO-ENTMCNC: 31.6 G/DL (ref 32–36)
MCV RBC AUTO: 85.9 FL (ref 80–99)
MONOCYTES # BLD AUTO: 0.88 10*3/MM3 (ref 0–1)
MONOCYTES NFR BLD AUTO: 6.5 % (ref 0–12)
NEUTROPHILS # BLD AUTO: 10.64 10*3/MM3 (ref 1.5–8.3)
NEUTROPHILS NFR BLD AUTO: 78.2 % (ref 41–71)
PATH REPORT.FINAL DX SPEC: NORMAL
PATH REPORT.GROSS SPEC: NORMAL
PLATELET # BLD AUTO: 406 10*3/MM3 (ref 150–450)
PMV BLD AUTO: 8.7 FL (ref 6–12)
POTASSIUM BLD-SCNC: 3.7 MMOL/L (ref 3.5–5.5)
PROT SERPL-MCNC: 6.1 G/DL (ref 5.7–8.2)
RBC # BLD AUTO: 4.05 10*6/MM3 (ref 3.89–5.14)
SODIUM BLD-SCNC: 137 MMOL/L (ref 132–146)
WBC NRBC COR # BLD: 13.59 10*3/MM3 (ref 3.5–10.8)

## 2019-03-28 PROCEDURE — 25010000002 PROCHLORPERAZINE EDISYLATE PER 10 MG: Performed by: PHYSICIAN ASSISTANT

## 2019-03-28 PROCEDURE — 25010000002 METOCLOPRAMIDE PER 10 MG: Performed by: SURGERY

## 2019-03-28 PROCEDURE — 0 DIATRIZOATE MEGLUMINE & SODIUM PER 1 ML: Performed by: SURGERY

## 2019-03-28 PROCEDURE — 25010000002 CEFAZOLIN PER 500 MG: Performed by: SURGERY

## 2019-03-28 PROCEDURE — 74241: CPT

## 2019-03-28 PROCEDURE — 25010000002 ONDANSETRON PER 1 MG: Performed by: SURGERY

## 2019-03-28 PROCEDURE — 25010000002 PROMETHAZINE PER 50 MG: Performed by: SURGERY

## 2019-03-28 PROCEDURE — 80053 COMPREHEN METABOLIC PANEL: CPT | Performed by: SURGERY

## 2019-03-28 PROCEDURE — 25010000002 ENOXAPARIN PER 10 MG: Performed by: SURGERY

## 2019-03-28 PROCEDURE — 82962 GLUCOSE BLOOD TEST: CPT

## 2019-03-28 PROCEDURE — 99024 POSTOP FOLLOW-UP VISIT: CPT | Performed by: SURGERY

## 2019-03-28 PROCEDURE — 25010000002 CYANOCOBALAMIN PER 1000 MCG: Performed by: SURGERY

## 2019-03-28 PROCEDURE — 85025 COMPLETE CBC W/AUTO DIFF WBC: CPT | Performed by: SURGERY

## 2019-03-28 PROCEDURE — 83540 ASSAY OF IRON: CPT | Performed by: SURGERY

## 2019-03-28 PROCEDURE — 25010000002 NA FERRIC GLUC CPLX PER 12.5 MG: Performed by: SURGERY

## 2019-03-28 PROCEDURE — 25010000002 THIAMINE PER 100 MG: Performed by: SURGERY

## 2019-03-28 RX ORDER — PROCHLORPERAZINE MALEATE 5 MG/1
5 TABLET ORAL EVERY 6 HOURS PRN
Status: DISCONTINUED | OUTPATIENT
Start: 2019-03-28 | End: 2019-03-31 | Stop reason: HOSPADM

## 2019-03-28 RX ORDER — PROCHLORPERAZINE 25 MG
25 SUPPOSITORY, RECTAL RECTAL EVERY 12 HOURS PRN
Status: DISCONTINUED | OUTPATIENT
Start: 2019-03-28 | End: 2019-03-31 | Stop reason: HOSPADM

## 2019-03-28 RX ADMIN — THIAMINE HYDROCHLORIDE 250 ML/HR: 100 INJECTION, SOLUTION INTRAMUSCULAR; INTRAVENOUS at 06:10

## 2019-03-28 RX ADMIN — ONDANSETRON 4 MG: 2 INJECTION INTRAMUSCULAR; INTRAVENOUS at 00:13

## 2019-03-28 RX ADMIN — Medication 30 ML: at 09:45

## 2019-03-28 RX ADMIN — PROMETHAZINE HYDROCHLORIDE 12.5 MG: 25 INJECTION INTRAMUSCULAR; INTRAVENOUS at 01:51

## 2019-03-28 RX ADMIN — ENOXAPARIN SODIUM 40 MG: 100 INJECTION SUBCUTANEOUS at 21:08

## 2019-03-28 RX ADMIN — ONDANSETRON 4 MG: 2 INJECTION INTRAMUSCULAR; INTRAVENOUS at 21:08

## 2019-03-28 RX ADMIN — METOCLOPRAMIDE 10 MG: 5 INJECTION, SOLUTION INTRAMUSCULAR; INTRAVENOUS at 15:47

## 2019-03-28 RX ADMIN — PROCHLORPERAZINE EDISYLATE 5 MG: 5 INJECTION INTRAMUSCULAR; INTRAVENOUS at 22:08

## 2019-03-28 RX ADMIN — POTASSIUM CHLORIDE AND SODIUM CHLORIDE 125 ML/HR: 450; 150 INJECTION, SOLUTION INTRAVENOUS at 08:10

## 2019-03-28 RX ADMIN — ENOXAPARIN SODIUM 40 MG: 100 INJECTION SUBCUTANEOUS at 08:10

## 2019-03-28 RX ADMIN — METOCLOPRAMIDE 10 MG: 5 INJECTION, SOLUTION INTRAMUSCULAR; INTRAVENOUS at 21:07

## 2019-03-28 RX ADMIN — CYANOCOBALAMIN 1000 MCG: 1000 INJECTION, SOLUTION INTRAMUSCULAR; SUBCUTANEOUS at 08:10

## 2019-03-28 RX ADMIN — METOCLOPRAMIDE 10 MG: 5 INJECTION, SOLUTION INTRAMUSCULAR; INTRAVENOUS at 03:05

## 2019-03-28 RX ADMIN — ONDANSETRON 4 MG: 2 INJECTION INTRAMUSCULAR; INTRAVENOUS at 09:26

## 2019-03-28 RX ADMIN — SODIUM CHLORIDE 125 MG: 9 INJECTION, SOLUTION INTRAVENOUS at 14:24

## 2019-03-28 RX ADMIN — SODIUM CHLORIDE 3 G: 9 INJECTION, SOLUTION INTRAVENOUS at 01:55

## 2019-03-29 LAB
ALBUMIN SERPL-MCNC: 3.57 G/DL (ref 3.2–4.8)
ALBUMIN/GLOB SERPL: 1.3 G/DL (ref 1.5–2.5)
ALP SERPL-CCNC: 51 U/L (ref 25–100)
ALT SERPL W P-5'-P-CCNC: 20 U/L (ref 7–40)
ANION GAP SERPL CALCULATED.3IONS-SCNC: 8 MMOL/L (ref 3–11)
AST SERPL-CCNC: 16 U/L (ref 0–33)
BASOPHILS # BLD AUTO: 0.05 10*3/MM3 (ref 0–0.2)
BASOPHILS NFR BLD AUTO: 0.4 % (ref 0–1)
BILIRUB SERPL-MCNC: 0.8 MG/DL (ref 0.3–1.2)
BUN BLD-MCNC: 8 MG/DL (ref 9–23)
BUN/CREAT SERPL: 11.8 (ref 7–25)
CALCIUM SPEC-SCNC: 8.1 MG/DL (ref 8.7–10.4)
CHLORIDE SERPL-SCNC: 107 MMOL/L (ref 99–109)
CO2 SERPL-SCNC: 20 MMOL/L (ref 20–31)
CREAT BLD-MCNC: 0.68 MG/DL (ref 0.6–1.3)
DEPRECATED RDW RBC AUTO: 55.6 FL (ref 37–54)
EOSINOPHIL # BLD AUTO: 0.07 10*3/MM3 (ref 0–0.3)
EOSINOPHIL NFR BLD AUTO: 0.6 % (ref 0–3)
ERYTHROCYTE [DISTWIDTH] IN BLOOD BY AUTOMATED COUNT: 17.6 % (ref 11.3–14.5)
GFR SERPL CREATININE-BSD FRML MDRD: 121 ML/MIN/1.73
GLOBULIN UR ELPH-MCNC: 2.8 GM/DL
GLUCOSE BLD-MCNC: 73 MG/DL (ref 70–100)
GLUCOSE BLDC GLUCOMTR-MCNC: 69 MG/DL (ref 70–130)
GLUCOSE BLDC GLUCOMTR-MCNC: 70 MG/DL (ref 70–130)
GLUCOSE BLDC GLUCOMTR-MCNC: 73 MG/DL (ref 70–130)
GLUCOSE BLDC GLUCOMTR-MCNC: 75 MG/DL (ref 70–130)
GLUCOSE BLDC GLUCOMTR-MCNC: 77 MG/DL (ref 70–130)
HCT VFR BLD AUTO: 33.4 % (ref 34.5–44)
HGB BLD-MCNC: 10.9 G/DL (ref 11.5–15.5)
IMM GRANULOCYTES # BLD AUTO: 0.02 10*3/MM3 (ref 0–0.05)
IMM GRANULOCYTES NFR BLD AUTO: 0.2 % (ref 0–0.6)
LYMPHOCYTES # BLD AUTO: 3.09 10*3/MM3 (ref 0.6–4.8)
LYMPHOCYTES NFR BLD AUTO: 26.5 % (ref 24–44)
MCH RBC QN AUTO: 27.9 PG (ref 27–31)
MCHC RBC AUTO-ENTMCNC: 32.6 G/DL (ref 32–36)
MCV RBC AUTO: 85.4 FL (ref 80–99)
MONOCYTES # BLD AUTO: 0.81 10*3/MM3 (ref 0–1)
MONOCYTES NFR BLD AUTO: 7 % (ref 0–12)
NEUTROPHILS # BLD AUTO: 7.61 10*3/MM3 (ref 1.5–8.3)
NEUTROPHILS NFR BLD AUTO: 65.3 % (ref 41–71)
PLATELET # BLD AUTO: 413 10*3/MM3 (ref 150–450)
PMV BLD AUTO: 8.5 FL (ref 6–12)
POTASSIUM BLD-SCNC: 3.5 MMOL/L (ref 3.5–5.5)
PROT SERPL-MCNC: 6.4 G/DL (ref 5.7–8.2)
RBC # BLD AUTO: 3.91 10*6/MM3 (ref 3.89–5.14)
SODIUM BLD-SCNC: 135 MMOL/L (ref 132–146)
WBC NRBC COR # BLD: 11.65 10*3/MM3 (ref 3.5–10.8)

## 2019-03-29 PROCEDURE — 25010000002 ENOXAPARIN PER 10 MG: Performed by: SURGERY

## 2019-03-29 PROCEDURE — 25010000002 METOCLOPRAMIDE PER 10 MG: Performed by: SURGERY

## 2019-03-29 PROCEDURE — 85025 COMPLETE CBC W/AUTO DIFF WBC: CPT | Performed by: SURGERY

## 2019-03-29 PROCEDURE — 82962 GLUCOSE BLOOD TEST: CPT

## 2019-03-29 PROCEDURE — 99024 POSTOP FOLLOW-UP VISIT: CPT | Performed by: SURGERY

## 2019-03-29 PROCEDURE — 80053 COMPREHEN METABOLIC PANEL: CPT | Performed by: SURGERY

## 2019-03-29 RX ORDER — POTASSIUM CHLORIDE 1.5 G/1.77G
40 POWDER, FOR SOLUTION ORAL ONCE
Status: COMPLETED | OUTPATIENT
Start: 2019-03-29 | End: 2019-03-29

## 2019-03-29 RX ADMIN — AMLODIPINE BESYLATE 5 MG: 5 TABLET ORAL at 08:53

## 2019-03-29 RX ADMIN — ENOXAPARIN SODIUM 40 MG: 100 INJECTION SUBCUTANEOUS at 08:52

## 2019-03-29 RX ADMIN — PANTOPRAZOLE SODIUM 40 MG: 40 TABLET, DELAYED RELEASE ORAL at 05:52

## 2019-03-29 RX ADMIN — POTASSIUM CHLORIDE AND SODIUM CHLORIDE 125 ML/HR: 450; 150 INJECTION, SOLUTION INTRAVENOUS at 07:33

## 2019-03-29 RX ADMIN — HYDROCODONE BITARTRATE AND ACETAMINOPHEN 1 TABLET: 7.5; 325 TABLET ORAL at 18:01

## 2019-03-29 RX ADMIN — POTASSIUM CHLORIDE 40 MEQ: 1.5 POWDER, FOR SOLUTION ORAL at 11:25

## 2019-03-29 RX ADMIN — POTASSIUM CHLORIDE AND SODIUM CHLORIDE 125 ML/HR: 450; 150 INJECTION, SOLUTION INTRAVENOUS at 17:53

## 2019-03-29 RX ADMIN — ACETAZOLAMIDE 500 MG: 500 CAPSULE, EXTENDED RELEASE ORAL at 08:52

## 2019-03-29 RX ADMIN — METOCLOPRAMIDE 10 MG: 5 INJECTION, SOLUTION INTRAMUSCULAR; INTRAVENOUS at 14:24

## 2019-03-29 RX ADMIN — ENOXAPARIN SODIUM 40 MG: 100 INJECTION SUBCUTANEOUS at 21:11

## 2019-03-30 LAB
ALBUMIN SERPL-MCNC: 3.74 G/DL (ref 3.2–4.8)
ALBUMIN/GLOB SERPL: 1.3 G/DL (ref 1.5–2.5)
ALP SERPL-CCNC: 53 U/L (ref 25–100)
ALT SERPL W P-5'-P-CCNC: 15 U/L (ref 7–40)
ANION GAP SERPL CALCULATED.3IONS-SCNC: 10 MMOL/L (ref 3–11)
AST SERPL-CCNC: 17 U/L (ref 0–33)
BACTERIA UR QL AUTO: ABNORMAL /HPF
BASOPHILS # BLD AUTO: 0.04 10*3/MM3 (ref 0–0.2)
BASOPHILS NFR BLD AUTO: 0.4 % (ref 0–1)
BILIRUB SERPL-MCNC: 1 MG/DL (ref 0.3–1.2)
BILIRUB UR QL STRIP: NEGATIVE
BUN BLD-MCNC: 8 MG/DL (ref 9–23)
BUN/CREAT SERPL: 11.8 (ref 7–25)
CALCIUM SPEC-SCNC: 8.3 MG/DL (ref 8.7–10.4)
CHLORIDE SERPL-SCNC: 108 MMOL/L (ref 99–109)
CLARITY UR: CLEAR
CO2 SERPL-SCNC: 17 MMOL/L (ref 20–31)
COLOR UR: ABNORMAL
CREAT BLD-MCNC: 0.68 MG/DL (ref 0.6–1.3)
DEPRECATED RDW RBC AUTO: 55.3 FL (ref 37–54)
EOSINOPHIL # BLD AUTO: 0.19 10*3/MM3 (ref 0–0.3)
EOSINOPHIL NFR BLD AUTO: 1.8 % (ref 0–3)
ERYTHROCYTE [DISTWIDTH] IN BLOOD BY AUTOMATED COUNT: 17.5 % (ref 11.3–14.5)
GFR SERPL CREATININE-BSD FRML MDRD: 121 ML/MIN/1.73
GLOBULIN UR ELPH-MCNC: 3 GM/DL
GLUCOSE BLD-MCNC: 73 MG/DL (ref 70–100)
GLUCOSE BLDC GLUCOMTR-MCNC: 65 MG/DL (ref 70–130)
GLUCOSE BLDC GLUCOMTR-MCNC: 73 MG/DL (ref 70–130)
GLUCOSE BLDC GLUCOMTR-MCNC: 75 MG/DL (ref 70–130)
GLUCOSE BLDC GLUCOMTR-MCNC: 79 MG/DL (ref 70–130)
GLUCOSE UR STRIP-MCNC: NEGATIVE MG/DL
HCT VFR BLD AUTO: 35.8 % (ref 34.5–44)
HGB BLD-MCNC: 11.5 G/DL (ref 11.5–15.5)
HGB UR QL STRIP.AUTO: ABNORMAL
HYALINE CASTS UR QL AUTO: ABNORMAL /LPF
IMM GRANULOCYTES # BLD AUTO: 0.02 10*3/MM3 (ref 0–0.05)
IMM GRANULOCYTES NFR BLD AUTO: 0.2 % (ref 0–0.6)
KETONES UR QL STRIP: ABNORMAL
LEUKOCYTE ESTERASE UR QL STRIP.AUTO: ABNORMAL
LYMPHOCYTES # BLD AUTO: 2.97 10*3/MM3 (ref 0.6–4.8)
LYMPHOCYTES NFR BLD AUTO: 27.6 % (ref 24–44)
MCH RBC QN AUTO: 27.6 PG (ref 27–31)
MCHC RBC AUTO-ENTMCNC: 32.1 G/DL (ref 32–36)
MCV RBC AUTO: 85.9 FL (ref 80–99)
MONOCYTES # BLD AUTO: 0.71 10*3/MM3 (ref 0–1)
MONOCYTES NFR BLD AUTO: 6.6 % (ref 0–12)
NEUTROPHILS # BLD AUTO: 6.85 10*3/MM3 (ref 1.5–8.3)
NEUTROPHILS NFR BLD AUTO: 63.4 % (ref 41–71)
NITRITE UR QL STRIP: NEGATIVE
PH UR STRIP.AUTO: 8 [PH] (ref 5–8)
PLATELET # BLD AUTO: 424 10*3/MM3 (ref 150–450)
PMV BLD AUTO: 8.5 FL (ref 6–12)
POTASSIUM BLD-SCNC: 4 MMOL/L (ref 3.5–5.5)
PROT SERPL-MCNC: 6.7 G/DL (ref 5.7–8.2)
PROT UR QL STRIP: NEGATIVE
RBC # BLD AUTO: 4.17 10*6/MM3 (ref 3.89–5.14)
RBC # UR: ABNORMAL /HPF
REF LAB TEST METHOD: ABNORMAL
SODIUM BLD-SCNC: 135 MMOL/L (ref 132–146)
SODIUM UR-SCNC: 78 MMOL/L (ref 30–90)
SP GR UR STRIP: 1.01 (ref 1–1.03)
SQUAMOUS #/AREA URNS HPF: ABNORMAL /HPF
UROBILINOGEN UR QL STRIP: ABNORMAL
WBC NRBC COR # BLD: 10.78 10*3/MM3 (ref 3.5–10.8)
WBC UR QL AUTO: ABNORMAL /HPF

## 2019-03-30 PROCEDURE — 80053 COMPREHEN METABOLIC PANEL: CPT | Performed by: SURGERY

## 2019-03-30 PROCEDURE — 84300 ASSAY OF URINE SODIUM: CPT | Performed by: SURGERY

## 2019-03-30 PROCEDURE — 94799 UNLISTED PULMONARY SVC/PX: CPT

## 2019-03-30 PROCEDURE — 63710000001 PROCHLORPERAZINE MALEATE PER 5 MG: Performed by: PHYSICIAN ASSISTANT

## 2019-03-30 PROCEDURE — 25010000002 DIPHENHYDRAMINE PER 50 MG: Performed by: SURGERY

## 2019-03-30 PROCEDURE — 81001 URINALYSIS AUTO W/SCOPE: CPT | Performed by: SURGERY

## 2019-03-30 PROCEDURE — 25010000002 PROCHLORPERAZINE EDISYLATE PER 10 MG: Performed by: PHYSICIAN ASSISTANT

## 2019-03-30 PROCEDURE — 85025 COMPLETE CBC W/AUTO DIFF WBC: CPT | Performed by: SURGERY

## 2019-03-30 PROCEDURE — 82962 GLUCOSE BLOOD TEST: CPT

## 2019-03-30 PROCEDURE — 99024 POSTOP FOLLOW-UP VISIT: CPT | Performed by: SURGERY

## 2019-03-30 PROCEDURE — 25010000002 ENOXAPARIN PER 10 MG: Performed by: SURGERY

## 2019-03-30 RX ADMIN — POTASSIUM CHLORIDE AND SODIUM CHLORIDE 125 ML/HR: 450; 150 INJECTION, SOLUTION INTRAVENOUS at 04:11

## 2019-03-30 RX ADMIN — ENOXAPARIN SODIUM 40 MG: 100 INJECTION SUBCUTANEOUS at 08:57

## 2019-03-30 RX ADMIN — ACETAZOLAMIDE 500 MG: 500 CAPSULE, EXTENDED RELEASE ORAL at 08:56

## 2019-03-30 RX ADMIN — PROCHLORPERAZINE EDISYLATE 5 MG: 5 INJECTION INTRAMUSCULAR; INTRAVENOUS at 04:05

## 2019-03-30 RX ADMIN — AMLODIPINE BESYLATE 5 MG: 5 TABLET ORAL at 08:56

## 2019-03-30 RX ADMIN — HYDROCODONE BITARTRATE AND ACETAMINOPHEN 1 TABLET: 7.5; 325 TABLET ORAL at 08:56

## 2019-03-30 RX ADMIN — PROCHLORPERAZINE MALEATE 5 MG: 5 TABLET, FILM COATED ORAL at 17:24

## 2019-03-30 RX ADMIN — SIMETHICONE CHEW TAB 80 MG 80 MG: 80 TABLET ORAL at 12:16

## 2019-03-30 RX ADMIN — HYDROCODONE BITARTRATE AND ACETAMINOPHEN 1 TABLET: 7.5; 325 TABLET ORAL at 17:24

## 2019-03-30 RX ADMIN — PANTOPRAZOLE SODIUM 40 MG: 40 TABLET, DELAYED RELEASE ORAL at 05:29

## 2019-03-30 RX ADMIN — ENOXAPARIN SODIUM 40 MG: 100 INJECTION SUBCUTANEOUS at 23:28

## 2019-03-30 RX ADMIN — DIPHENHYDRAMINE HYDROCHLORIDE 25 MG: 50 INJECTION INTRAMUSCULAR; INTRAVENOUS at 00:02

## 2019-03-30 RX ADMIN — HYDROMORPHONE HYDROCHLORIDE 2 MG: 2 TABLET ORAL at 04:11

## 2019-03-30 RX ADMIN — POTASSIUM CHLORIDE AND SODIUM CHLORIDE 125 ML/HR: 450; 150 INJECTION, SOLUTION INTRAVENOUS at 13:45

## 2019-03-31 VITALS
TEMPERATURE: 98.1 F | HEIGHT: 66 IN | DIASTOLIC BLOOD PRESSURE: 45 MMHG | RESPIRATION RATE: 16 BRPM | WEIGHT: 293 LBS | BODY MASS INDEX: 47.09 KG/M2 | OXYGEN SATURATION: 98 % | HEART RATE: 85 BPM | SYSTOLIC BLOOD PRESSURE: 131 MMHG

## 2019-03-31 LAB
ALBUMIN SERPL-MCNC: 3.31 G/DL (ref 3.2–4.8)
ALBUMIN/GLOB SERPL: 1.3 G/DL (ref 1.5–2.5)
ALP SERPL-CCNC: 43 U/L (ref 25–100)
ALT SERPL W P-5'-P-CCNC: 17 U/L (ref 7–40)
ANION GAP SERPL CALCULATED.3IONS-SCNC: 7 MMOL/L (ref 3–11)
AST SERPL-CCNC: 16 U/L (ref 0–33)
BASOPHILS # BLD AUTO: 0.03 10*3/MM3 (ref 0–0.2)
BASOPHILS NFR BLD AUTO: 0.4 % (ref 0–1)
BILIRUB SERPL-MCNC: 0.8 MG/DL (ref 0.3–1.2)
BUN BLD-MCNC: 9 MG/DL (ref 9–23)
BUN/CREAT SERPL: 12.3 (ref 7–25)
CALCIUM SPEC-SCNC: 8.1 MG/DL (ref 8.7–10.4)
CHLORIDE SERPL-SCNC: 110 MMOL/L (ref 99–109)
CO2 SERPL-SCNC: 19 MMOL/L (ref 20–31)
CREAT BLD-MCNC: 0.73 MG/DL (ref 0.6–1.3)
DEPRECATED RDW RBC AUTO: 55.6 FL (ref 37–54)
EOSINOPHIL # BLD AUTO: 0.25 10*3/MM3 (ref 0–0.3)
EOSINOPHIL NFR BLD AUTO: 3 % (ref 0–3)
ERYTHROCYTE [DISTWIDTH] IN BLOOD BY AUTOMATED COUNT: 17.3 % (ref 11.3–14.5)
GFR SERPL CREATININE-BSD FRML MDRD: 111 ML/MIN/1.73
GLOBULIN UR ELPH-MCNC: 2.6 GM/DL
GLUCOSE BLD-MCNC: 69 MG/DL (ref 70–100)
HCT VFR BLD AUTO: 32.2 % (ref 34.5–44)
HGB BLD-MCNC: 10 G/DL (ref 11.5–15.5)
IMM GRANULOCYTES # BLD AUTO: 0.02 10*3/MM3 (ref 0–0.05)
IMM GRANULOCYTES NFR BLD AUTO: 0.2 % (ref 0–0.6)
LYMPHOCYTES # BLD AUTO: 2.34 10*3/MM3 (ref 0.6–4.8)
LYMPHOCYTES NFR BLD AUTO: 28.2 % (ref 24–44)
MCH RBC QN AUTO: 27.4 PG (ref 27–31)
MCHC RBC AUTO-ENTMCNC: 31.1 G/DL (ref 32–36)
MCV RBC AUTO: 88.2 FL (ref 80–99)
MONOCYTES # BLD AUTO: 0.48 10*3/MM3 (ref 0–1)
MONOCYTES NFR BLD AUTO: 5.8 % (ref 0–12)
NEUTROPHILS # BLD AUTO: 5.19 10*3/MM3 (ref 1.5–8.3)
NEUTROPHILS NFR BLD AUTO: 62.6 % (ref 41–71)
PLATELET # BLD AUTO: 382 10*3/MM3 (ref 150–450)
PMV BLD AUTO: 8.6 FL (ref 6–12)
POTASSIUM BLD-SCNC: 4.3 MMOL/L (ref 3.5–5.5)
PROT SERPL-MCNC: 5.9 G/DL (ref 5.7–8.2)
RBC # BLD AUTO: 3.65 10*6/MM3 (ref 3.89–5.14)
SODIUM BLD-SCNC: 136 MMOL/L (ref 132–146)
WBC NRBC COR # BLD: 8.29 10*3/MM3 (ref 3.5–10.8)

## 2019-03-31 PROCEDURE — 25010000002 ENOXAPARIN PER 10 MG: Performed by: SURGERY

## 2019-03-31 PROCEDURE — 99024 POSTOP FOLLOW-UP VISIT: CPT | Performed by: SURGERY

## 2019-03-31 PROCEDURE — 85025 COMPLETE CBC W/AUTO DIFF WBC: CPT | Performed by: SURGERY

## 2019-03-31 PROCEDURE — 80053 COMPREHEN METABOLIC PANEL: CPT | Performed by: SURGERY

## 2019-03-31 RX ORDER — PROCHLORPERAZINE MALEATE 10 MG
10 TABLET ORAL EVERY 6 HOURS PRN
Qty: 20 TABLET | Refills: 0 | Status: SHIPPED | OUTPATIENT
Start: 2019-03-31 | End: 2019-06-26

## 2019-03-31 RX ORDER — OMEPRAZOLE 40 MG/1
40 CAPSULE, DELAYED RELEASE ORAL DAILY
Qty: 60 CAPSULE | Refills: 1 | Status: SHIPPED | OUTPATIENT
Start: 2019-03-31 | End: 2019-04-02 | Stop reason: SDUPTHER

## 2019-03-31 RX ORDER — HYDROCODONE BITARTRATE AND ACETAMINOPHEN 7.5; 325 MG/1; MG/1
1 TABLET ORAL EVERY 4 HOURS PRN
Qty: 20 TABLET | Refills: 0 | Status: SHIPPED | OUTPATIENT
Start: 2019-03-31 | End: 2019-04-10

## 2019-03-31 RX ORDER — ONDANSETRON 4 MG/1
4 TABLET, FILM COATED ORAL EVERY 4 HOURS PRN
Qty: 20 TABLET | Refills: 0 | Status: SHIPPED | OUTPATIENT
Start: 2019-03-31 | End: 2019-04-02 | Stop reason: SDUPTHER

## 2019-03-31 RX ADMIN — HYDROCODONE BITARTRATE AND ACETAMINOPHEN 1 TABLET: 7.5; 325 TABLET ORAL at 10:01

## 2019-03-31 RX ADMIN — HYDROCODONE BITARTRATE AND ACETAMINOPHEN 1 TABLET: 7.5; 325 TABLET ORAL at 03:58

## 2019-03-31 RX ADMIN — ACETAZOLAMIDE 500 MG: 500 CAPSULE, EXTENDED RELEASE ORAL at 10:01

## 2019-03-31 RX ADMIN — ENOXAPARIN SODIUM 40 MG: 100 INJECTION SUBCUTANEOUS at 10:01

## 2019-03-31 RX ADMIN — AMLODIPINE BESYLATE 5 MG: 5 TABLET ORAL at 10:01

## 2019-03-31 RX ADMIN — PANTOPRAZOLE SODIUM 40 MG: 40 TABLET, DELAYED RELEASE ORAL at 06:03

## 2019-04-01 ENCOUNTER — READMISSION MANAGEMENT (OUTPATIENT)
Dept: CALL CENTER | Facility: HOSPITAL | Age: 34
End: 2019-04-01

## 2019-04-01 NOTE — OUTREACH NOTE
Prep Survey      Responses   Facility patient discharged from?  [place]   Is patient eligible?  Yes   Discharge diagnosis  Morbid obesity with BMI 50-59.9, s/p gastric sleeve laparoscopic, EGD, longitudinal gastrectomy   Does the patient have one of the following disease processes/diagnoses(primary or secondary)?  General Surgery   Does the patient have Home health ordered?  No   Is there a DME ordered?  No   Comments regarding appointments  See AVS   Prep survey completed?  Yes          Verena Lackey RN

## 2019-04-01 NOTE — DISCHARGE SUMMARY
Admission Diagnosis:   Super Morbid Obesity with Multiple Co-morbidities    Discharge Diagnosis:  Same    Principal Procedure Performed:   Laparoscopic sleeve gastrectomy,  EGD    Other procedures performed: Upper GI    Complications: None    Consultations: None    History of present illness:  This is a 33-year-old super morbidly obese patient (BMI 50.8) who presents for elective laparoscopic sleeve gastrectomy.  The preoperative testing and evaluation is in order and the patient is admitted for elective surgery.    Hospital Course:  The patient was admitted and underwent uneventful surgery as described.  Postoperatively the patient was transferred to the bariatric telemetry unit. Upper GI on postoperative day #1 was unremarkable.  Severe nausea and vomiting that slowly improved each day.  IV iron given for low level, no signs active bleeding on lovenox.  At the time of discharge on postoperative day #4 the patient is tolerating a diet and pain is controlled with oral medication.  The patient is afebrile and abdominal exam is appropriate, wounds look okay.  The patient is discharged home in good condition.  Did w/u her low C02 and ketonuria, no evidence dehydration, suspect combination diamox and preop high protein diet.  Her hypoK+ on chronic HCTZ Rx before and after surgery.  Discharge instructions were discussed.  The medication reconciliation has been completed.  The patient is to follow-up in 1-2 weeks in the office.  Eliquis.

## 2019-04-02 ENCOUNTER — READMISSION MANAGEMENT (OUTPATIENT)
Dept: CALL CENTER | Facility: HOSPITAL | Age: 34
End: 2019-04-02

## 2019-04-02 RX ORDER — ONDANSETRON 4 MG/1
4 TABLET, FILM COATED ORAL EVERY 4 HOURS PRN
Qty: 20 TABLET | Refills: 0 | Status: SHIPPED | OUTPATIENT
Start: 2019-04-02 | End: 2019-06-26

## 2019-04-02 RX ORDER — OMEPRAZOLE 40 MG/1
40 CAPSULE, DELAYED RELEASE ORAL DAILY
Qty: 60 CAPSULE | Refills: 1 | Status: SHIPPED | OUTPATIENT
Start: 2019-04-02 | End: 2019-04-29

## 2019-04-02 NOTE — OUTREACH NOTE
General Surgery Week 1 Survey      Responses   Facility patient discharged from?  [place]   Does the patient have one of the following disease processes/diagnoses(primary or secondary)?  General Surgery   Is there a successful TCM telephone encounter documented?  No   Week 1 attempt successful?  No   Unsuccessful attempts  Attempt 1          Roma Rojas RN

## 2019-04-03 ENCOUNTER — OFFICE VISIT (OUTPATIENT)
Dept: BARIATRICS/WEIGHT MGMT | Facility: CLINIC | Age: 34
End: 2019-04-03

## 2019-04-03 VITALS
SYSTOLIC BLOOD PRESSURE: 118 MMHG | DIASTOLIC BLOOD PRESSURE: 70 MMHG | HEIGHT: 66 IN | WEIGHT: 293 LBS | BODY MASS INDEX: 47.09 KG/M2 | HEART RATE: 65 BPM | OXYGEN SATURATION: 99 % | TEMPERATURE: 97.7 F | RESPIRATION RATE: 18 BRPM

## 2019-04-03 DIAGNOSIS — Z98.84 STATUS POST BARIATRIC SURGERY: Primary | ICD-10-CM

## 2019-04-03 DIAGNOSIS — E66.01 OBESITY, CLASS III, BMI 40-49.9 (MORBID OBESITY) (HCC): ICD-10-CM

## 2019-04-03 PROCEDURE — 99024 POSTOP FOLLOW-UP VISIT: CPT | Performed by: PHYSICIAN ASSISTANT

## 2019-04-03 RX ORDER — URSODIOL 300 MG/1
300 CAPSULE ORAL 2 TIMES DAILY
Qty: 60 CAPSULE | Refills: 5 | Status: SHIPPED | OUTPATIENT
Start: 2019-04-03 | End: 2019-05-03

## 2019-04-03 NOTE — PROGRESS NOTES
[facility] Bariatric Surgery  [address]  [facility] [id number] [phone]      Patient Name:  Deepika Gonzalez.  :  1985      Reason for Visit:  POD #7    HPI:  Deepika Gonzalez is a 33 y.o. female s/p LSG by  on 3/27/19    POD#1 with a lot of nausea, poor ambulation. IV iron for iron deficiency. POD#2 with n/v poor oral intake. POD#3 low CO2, improved but with poor fluid intake. POD#4- discharged with eliquis, low CO2 with ketonuria without evidence of dehydration, unclear significant possible related to her diamox.     Doing well.  Says it has been rough but each day is getting better and is doing pretty well now.  Most bothersome is that she started menses in hospital, has heavy period. Taking norco for lower pelvic period cramping. Reflux noted daily, burning substernal with drinking. Has had minimal nausea, has not required antiemetics since being home.  Mild abdominal pain around mid umbilicus.    Denies dysphagia, vomiting, pulmonary issues and fevers. Using IS to 2500.  Tolerating diet progression - on stage 1.  Getting 80g prot/day, shakes.  Drinking 48 fluid oz/day.  Has not started full vitamins yet.  On MVI + D.   On Omeprazole  and Eliquis .  Holding ASA , NSAIDs , Tramadol, Hormones, Steroids and Immunologics, stopped HCTZ, taking diamox.  Ambulating- active.     Presurgery weight: 310 pounds.  Today's weight is (!) 137 kg (302 lb 8 oz) pounds, today's  Body mass index is 49.57 kg/m²., and her weight loss since surgery is 8 pounds.       Final Diagnosis    STOMACH, SUBTOTAL GASTRECTOMY:  No significant histopathologic change.  Negative for intestinal metaplasia, dysplasia, and significant inflammation.  No Helicobacter pylori-like organisms identified on routine stains.         Past Medical History:   Diagnosis Date   • Abnormal PFTs     restrictive   • Allergic rhinitis    • Anxiety     seeing therapist   • Dental crown present     lower left     • Dyspepsia    • Fatigue    • GERD (gastroesophageal reflux disease)     has been on PPI in the past, improved recently- no longer on antacids. EGD 2005, no h/o h pylori.    • History of headache     sinus   • History of palpitations     due to caffeine inake in the past    • HTN (hypertension)    • Hypokalemia     2.9   • Insulin resistance    • Lower extremity edema     water pill daily    • Microcytic red blood cells    • Morbid obesity with BMI of 50.0-59.9, adult (CMS/[facility])    • Paresthesias     of hands and feet, thought to be 2/2 diamox   • PCOS (polycystic ovarian syndrome)     on metformin for it    • Pseudotumor cerebri     followed by opthalmologist- pending neurology specialist.  Has HA, on diamox.    • Seasonal allergies    • Thrombocytosis (CMS/[facility])    • Wears glasses    • Wears glasses    • Wheezing 2008    in the past due to allergies, improved now. No longer uses inhaler.      Past Surgical History:   Procedure Laterality Date   • EAR TUBES  1995    x 3   • ENDOSCOPY  2005    Dr Hay in [facility],  showed reflux   • ENDOSCOPY  8/2/2018    Procedure: ESOPHAGOGASTRODUODENOSCOPY WITH BIOPSY;  Surgeon: Daquan Martinez MD;  Location:  [facility];  Service: Gastroenterology   • ENDOSCOPY N/A 3/27/2019    Procedure: ESOPHAGOGASTRODUODENOSCOPY;  Surgeon: Daquan Martinez MD;  Location:  [facility];  Service: Bariatric   • GASTRIC SLEEVE LAPAROSCOPIC N/A 3/27/2019    Procedure: GASTRIC SLEEVE LAPAROSCOPIC;  Surgeon: Daquan Martinez MD;  Location:  [facility];  Service: Bariatric     Outpatient Medications Marked as Taking for the 4/3/19 encounter (Office Visit) with Roma Delgadillo PA-C   Medication Sig Dispense Refill   • acetaZOLAMIDE (DIAMOX) 500 MG capsule Take 500 mg by mouth Daily.     • amLODIPine (NORVASC) 5 MG tablet Take 5 mg by mouth Daily.  5   • apixaban (ELIQUIS) 2.5 MG tablet tablet Take 1 tablet by mouth Every 12 (Twelve) Hours for 42 doses. (Patient taking differently: Take  "2.5 mg by mouth Every 12 (Twelve) Hours. After surgery) 42 tablet 0   • busPIRone (BUSPAR) 15 MG tablet Take 15 mg by mouth 2 (Two) Times a Day As Needed.     • Cholecalciferol (VITAMIN D3) 5000 units capsule capsule Take 5,000 Units by mouth Daily.     • HYDROcodone-acetaminophen (NORCO) 7.5-325 MG per tablet Take 1 tablet by mouth Every 4 (Four) Hours As Needed for Moderate Pain  for up to 10 days. 20 tablet 0   • Multiple Vitamin (MULTI-DAY PO) Take 1 tablet by mouth Daily.     • omeprazole (PRILOSEC) 40 MG capsule Take 1 capsule by mouth Daily for 120 days. 60 capsule 1     No Known Allergies    Social History     Socioeconomic History   • Marital status: Single     Spouse name: Not on file   • Number of children: Not on file   • Years of education: Masters Degree    • Highest education level: Not on file   Occupational History   • Occupation:    Tobacco Use   • Smoking status: Never Smoker   • Smokeless tobacco: Never Used   Substance and Sexual Activity   • Alcohol use: Yes     Comment: very rarely    • Drug use: No   • Sexual activity: Defer     Comment: no hormones   Social History Narrative    Lives in [facility] [facility] with mother.  Works in , travels a lot.        /70 (BP Location: Left arm, Patient Position: Sitting, Cuff Size: Large Adult)   Pulse 65   Temp 97.7 °F (36.5 °C) (Temporal)   Resp 18   Ht 166.4 cm (65.5\")   Wt (!) 137 kg (302 lb 8 oz)   LMP 03/14/2019   SpO2 99%   BMI 49.57 kg/m²   Physical Exam   Constitutional: She is oriented to person, place, and time. She appears well-developed and well-nourished.   HENT:   Head: Normocephalic and atraumatic.   Cardiovascular: Normal rate, regular rhythm and normal heart sounds.   Pulmonary/Chest: Effort normal and breath sounds normal. No respiratory distress.   Abdominal: Soft. Bowel sounds are normal. She exhibits no distension. There is no tenderness.   Incisions healing well   Neurological: She is alert and " oriented to person, place, and time.   Skin: Skin is warm and dry.   Psychiatric: She has a normal mood and affect. Her behavior is normal. Judgment and thought content normal.         Assessment:   POD #7 s/p LSG by  on 3/27/19    ICD-10-CM ICD-9-CM   1. Status post bariatric surgery Z98.84 V45.86   2. Obesity, Class III, BMI 40-49.9 (morbid obesity) (CMS/[facility]) E66.01 278.01         Plan:  Doing fine. Increase PPI to BID. Let us know if menorrhagia persists. Continue to advance diet per manual.  Increase protein intake to 100g/day.  Increase exercise/activity as tolerated. Increase fluids to 64oz.   Reviewed lifting restrictions, nothing >25 lbs x 2 more weeks.  Start full vitamin regimen.  Continue PPI.  Start actigall. Cont eliquis. Continue to avoid ASA/NSAIDs/tramadol/tobacco x 6 weeks postop, steroids x 8 weeks postop.  Call w/ problems/concerns.    The patient was instructed to follow up in 3 weeks, sooner if needed.

## 2019-04-04 ENCOUNTER — READMISSION MANAGEMENT (OUTPATIENT)
Dept: CALL CENTER | Facility: HOSPITAL | Age: 34
End: 2019-04-04

## 2019-04-04 ENCOUNTER — TELEPHONE (OUTPATIENT)
Dept: BARIATRICS/WEIGHT MGMT | Facility: CLINIC | Age: 34
End: 2019-04-04

## 2019-04-04 NOTE — OUTREACH NOTE
General Surgery Week 1 Survey      Responses   Facility patient discharged from?  [place]   Does the patient have one of the following disease processes/diagnoses(primary or secondary)?  General Surgery   Is there a successful TCM telephone encounter documented?  No   Week 1 attempt successful?  No   Unsuccessful attempts  Attempt 2          Kelle Sierra RN

## 2019-04-04 NOTE — TELEPHONE ENCOUNTER
Pt notified to follow up w/ Gyn next week if she continues with the heavy menses, pt verbalized her understanding.

## 2019-04-08 ENCOUNTER — READMISSION MANAGEMENT (OUTPATIENT)
Dept: CALL CENTER | Facility: HOSPITAL | Age: 34
End: 2019-04-08

## 2019-04-08 NOTE — OUTREACH NOTE
General Surgery Week 2 Survey      Responses   Facility patient discharged from?  [place]   Does the patient have one of the following disease processes/diagnoses(primary or secondary)?  General Surgery   Week 2 attempt successful?  No   Unsuccessful attempts  Attempt 1          Kelle Sierra RN

## 2019-04-10 ENCOUNTER — READMISSION MANAGEMENT (OUTPATIENT)
Dept: CALL CENTER | Facility: HOSPITAL | Age: 34
End: 2019-04-10

## 2019-04-10 NOTE — OUTREACH NOTE
General Surgery Week 2 Survey      Responses   Facility patient discharged from?  [place]   Does the patient have one of the following disease processes/diagnoses(primary or secondary)?  General Surgery   Week 2 attempt successful?  No   Unsuccessful attempts  Attempt 2          Kelle Sierra RN

## 2019-04-29 ENCOUNTER — OFFICE VISIT (OUTPATIENT)
Dept: BARIATRICS/WEIGHT MGMT | Facility: CLINIC | Age: 34
End: 2019-04-29

## 2019-04-29 VITALS
BODY MASS INDEX: 45.48 KG/M2 | OXYGEN SATURATION: 99 % | RESPIRATION RATE: 18 BRPM | HEART RATE: 104 BPM | SYSTOLIC BLOOD PRESSURE: 118 MMHG | TEMPERATURE: 97.8 F | DIASTOLIC BLOOD PRESSURE: 68 MMHG | HEIGHT: 66 IN | WEIGHT: 283 LBS

## 2019-04-29 DIAGNOSIS — E55.9 HYPOVITAMINOSIS D: ICD-10-CM

## 2019-04-29 DIAGNOSIS — Z98.84 STATUS POST BARIATRIC SURGERY: ICD-10-CM

## 2019-04-29 DIAGNOSIS — Z90.3 POSTGASTRECTOMY MALABSORPTION: ICD-10-CM

## 2019-04-29 DIAGNOSIS — K91.2 POSTGASTRECTOMY MALABSORPTION: ICD-10-CM

## 2019-04-29 DIAGNOSIS — Z13.0 SCREENING, IRON DEFICIENCY ANEMIA: ICD-10-CM

## 2019-04-29 DIAGNOSIS — R53.83 FATIGUE, UNSPECIFIED TYPE: Primary | ICD-10-CM

## 2019-04-29 DIAGNOSIS — R11.0 NAUSEA: ICD-10-CM

## 2019-04-29 DIAGNOSIS — K21.9 GASTROESOPHAGEAL REFLUX DISEASE, ESOPHAGITIS PRESENCE NOT SPECIFIED: ICD-10-CM

## 2019-04-29 DIAGNOSIS — E66.01 OBESITY, CLASS III, BMI 40-49.9 (MORBID OBESITY) (HCC): ICD-10-CM

## 2019-04-29 DIAGNOSIS — Z13.21 MALNUTRITION SCREEN: ICD-10-CM

## 2019-04-29 PROCEDURE — 99024 POSTOP FOLLOW-UP VISIT: CPT | Performed by: PHYSICIAN ASSISTANT

## 2019-04-29 RX ORDER — OMEPRAZOLE 40 MG/1
40 CAPSULE, DELAYED RELEASE ORAL 2 TIMES DAILY
Qty: 60 CAPSULE | Refills: 2 | Status: SHIPPED | OUTPATIENT
Start: 2019-04-29 | End: 2021-07-05

## 2019-04-29 NOTE — PROGRESS NOTES
[facility] Bariatric Surgery  [address]  [facility] [id number] [phone]      Patient Name:  Deepika Gonzalez.  :  1985      Reason for Visit:  1 month postop    HPI:  Deepika Gonzalez is a 33 y.o. female s/p LSG by  on 3/27/19    Doing ok, has been challenging. Has persisting nausea in the morning, EOD.  Uses zofran sparingly. Has not vomiting in 1.5 weeks. Has some soreness/ achy around incisions noted intermittenly, improving. Has been calling for refill and left messages but has not heard back- was taking omeprazole 40mg BID controlling symptoms well, symptomatic with being out.  Denies pulmonary issues and fevers.  Tolerating diet progression - on stage 5. Tolerating food well. Challenging to take small bites.  Eating every 2 hours.  Getting 50g prot/day, mostly through food- getting tired of protein supplements/ shakes/ powder.  Drinking <64 fluid oz/day, water- improving over time.  Taking MVI, B12, B1, Calcium, Vit D, iron and Vit C.  On Omeprazole  and Actigall .  Still holding ASA , NSAIDs , Tramadol, Hormones, Diuretics , Steroids and Immunologics.  Just came back from [place]- there was a lot of smoke, tried to stay away from it.  Not exercising.     Presurgery weight:  310 pounds. Today's weight is 128 kg (283 lb) pounds, today's Body mass index is 46.38 kg/m²., and her weight loss since surgery is 27 pounds.       Past Medical History:   Diagnosis Date   • Abnormal PFTs     restrictive   • Allergic rhinitis    • Anxiety     seeing therapist   • Dental crown present     lower left    • Dyspepsia    • Fatigue    • GERD (gastroesophageal reflux disease)     has been on PPI in the past, improved recently- no longer on antacids. EGD , no h/o h pylori.    • History of headache     sinus   • History of palpitations     due to caffeine inake in the past    • HTN (hypertension)    • Hypokalemia     2.9   • Insulin resistance    • Lower extremity  edema     water pill daily    • Microcytic red blood cells    • Morbid obesity with BMI of 50.0-59.9, adult (CMS/HCC)    • Paresthesias     of hands and feet, thought to be 2/2 diamox   • PCOS (polycystic ovarian syndrome)     on metformin for it    • Pseudotumor cerebri     followed by opthalmologist- pending neurology specialist.  Has HA, on diamox.    • Seasonal allergies    • Thrombocytosis (CMS/HCC)    • Wears glasses    • Wears glasses    • Wheezing 2008    in the past due to allergies, improved now. No longer uses inhaler.      Past Surgical History:   Procedure Laterality Date   • EAR TUBES  1995    x 3   • ENDOSCOPY  2005    Dr Hay in [facility],  showed reflux   • ENDOSCOPY  8/2/2018    Procedure: ESOPHAGOGASTRODUODENOSCOPY WITH BIOPSY;  Surgeon: Daquan Martinez MD;  Location:  [facility];  Service: Gastroenterology   • ENDOSCOPY N/A 3/27/2019    Procedure: ESOPHAGOGASTRODUODENOSCOPY;  Surgeon: Daquan Martinez MD;  Location:  [facility];  Service: Bariatric   • GASTRIC SLEEVE LAPAROSCOPIC N/A 3/27/2019    Procedure: GASTRIC SLEEVE LAPAROSCOPIC;  Surgeon: Daquan Martinez MD;  Location:  [facility];  Service: Bariatric     Outpatient Medications Marked as Taking for the 4/29/19 encounter (Office Visit) with Roma Delgadillo PA-C   Medication Sig Dispense Refill   • acetaZOLAMIDE (DIAMOX) 500 MG capsule Take 500 mg by mouth Daily.     • amLODIPine (NORVASC) 5 MG tablet Take 5 mg by mouth Daily.  5   • busPIRone (BUSPAR) 15 MG tablet Take 15 mg by mouth 2 (Two) Times a Day As Needed.     • Cholecalciferol (VITAMIN D3) 5000 units capsule capsule Take 5,000 Units by mouth Daily.     • Multiple Vitamin (MULTI-DAY PO) Take 1 tablet by mouth Daily.     • ondansetron (ZOFRAN) 4 MG tablet Take 1 tablet by mouth Every 4 (Four) Hours As Needed for Nausea. 20 tablet 0   • ursodiol (ACTIGALL) 300 MG capsule Take 1 capsule by mouth 2 (Two) Times a Day for 30 days. 60 capsule 5   • [DISCONTINUED] omeprazole  "(PRILOSEC) 40 MG capsule Take 1 capsule by mouth Daily for 120 days. 60 capsule 1     No Known Allergies    Social History     Socioeconomic History   • Marital status: Single     Spouse name: Not on file   • Number of children: Not on file   • Years of education: Masters Degree    • Highest education level: Not on file   Occupational History   • Occupation:    Tobacco Use   • Smoking status: Never Smoker   • Smokeless tobacco: Never Used   Substance and Sexual Activity   • Alcohol use: Yes     Comment: very rarely    • Drug use: No   • Sexual activity: Defer     Comment: no hormones   Social History Narrative    Lives in [facility] with mother.  Works in [employer], travels a lot.        /68 (BP Location: Left arm, Patient Position: Sitting, Cuff Size: Large Adult)   Pulse 104   Temp 97.8 °F (36.6 °C) (Temporal)   Resp 18   Ht 166.4 cm (65.5\")   Wt 128 kg (283 lb)   SpO2 99%   BMI 46.38 kg/m²     Physical Exam   Constitutional: She is oriented to person, place, and time. She appears well-developed and well-nourished.   HENT:   Head: Normocephalic and atraumatic.   Cardiovascular: Normal rate, regular rhythm and normal heart sounds.   Pulmonary/Chest: Effort normal and breath sounds normal. No respiratory distress. She has no wheezes.   Abdominal: Soft. Bowel sounds are normal. She exhibits no distension. There is no tenderness.   Incisions healing well   Neurological: She is alert and oriented to person, place, and time.   Skin: Skin is warm and dry.   Psychiatric: She has a normal mood and affect. Her behavior is normal. Judgment and thought content normal.         Assessment:  1 month s/p LSG by  on 3/27/19    ICD-10-CM ICD-9-CM   1. Fatigue, unspecified type R53.83 780.79   2. Status post bariatric surgery Z98.84 V45.86   3. Obesity, Class III, BMI 40-49.9 (morbid obesity) (CMS/HCC) E66.01 278.01   4. Hypovitaminosis D E55.9 268.9   5. Screening, iron deficiency anemia " Z13.0 V78.0   6. Malnutrition screen Z13.21 V77.2   7. 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 Nausea R11.0 787.02   9. Gastroesophageal reflux disease, esophagitis presence not specified K21.9 530.81         Plan: Will evaluate with UGI/ GB workup, GBUS followed by HIDA if needed. Continue to advance diet per manual.  Discussed importance of Increasing  protein to 70-100g/day.  Encouraged good food choices - high protein, low carb.  Continue fluids 64oz+ per day. Continue routine exercise, lifting restrictions lifted.  Continue PPI, new Rx sent. Continue actigall.  Continue to avoid NSAIDS, ASA, tramadol, tobacco x 6 weeks postop, steroids x 8 weeks postop.  Routine bariatric labs ordered.  Continue vitamins w/ adjustments pending lab results.  Call w/ problems/concerns.    The patient was instructed to follow up in 2 months, sooner if needed.

## 2019-05-03 LAB
25(OH)D3+25(OH)D2 SERPL-MCNC: 50.6 NG/ML (ref 30–100)
ALBUMIN SERPL-MCNC: 4.1 G/DL (ref 3.5–5.5)
ALBUMIN/GLOB SERPL: 1.2 {RATIO} (ref 1.2–2.2)
ALP SERPL-CCNC: 77 IU/L (ref 39–117)
ALT SERPL-CCNC: 14 IU/L (ref 0–32)
AST SERPL-CCNC: 15 IU/L (ref 0–40)
BASOPHILS # BLD AUTO: 0.1 X10E3/UL (ref 0–0.2)
BASOPHILS NFR BLD AUTO: 1 %
BILIRUB SERPL-MCNC: 0.6 MG/DL (ref 0–1.2)
BUN SERPL-MCNC: 9 MG/DL (ref 6–20)
BUN/CREAT SERPL: 10 (ref 9–23)
CALCIUM SERPL-MCNC: 9.1 MG/DL (ref 8.7–10.2)
CHLORIDE SERPL-SCNC: 106 MMOL/L (ref 96–106)
CO2 SERPL-SCNC: 17 MMOL/L (ref 20–29)
CREAT SERPL-MCNC: 0.91 MG/DL (ref 0.57–1)
EOSINOPHIL # BLD AUTO: 0.4 X10E3/UL (ref 0–0.4)
EOSINOPHIL NFR BLD AUTO: 4 %
ERYTHROCYTE [DISTWIDTH] IN BLOOD BY AUTOMATED COUNT: 18.2 % (ref 12.3–15.4)
FERRITIN SERPL-MCNC: 125 NG/ML (ref 15–150)
FOLATE SERPL-MCNC: 11.4 NG/ML
GLOBULIN SER CALC-MCNC: 3.3 G/DL (ref 1.5–4.5)
GLUCOSE SERPL-MCNC: 88 MG/DL (ref 65–99)
HCT VFR BLD AUTO: 37 % (ref 34–46.6)
HGB BLD-MCNC: 12.1 G/DL (ref 11.1–15.9)
IMM GRANULOCYTES # BLD AUTO: 0 X10E3/UL (ref 0–0.1)
IMM GRANULOCYTES NFR BLD AUTO: 0 %
IRON SERPL-MCNC: 22 UG/DL (ref 27–159)
LYMPHOCYTES # BLD AUTO: 2.5 X10E3/UL (ref 0.7–3.1)
LYMPHOCYTES NFR BLD AUTO: 25 %
Lab: NORMAL
MCH RBC QN AUTO: 27.8 PG (ref 26.6–33)
MCHC RBC AUTO-ENTMCNC: 32.7 G/DL (ref 31.5–35.7)
MCV RBC AUTO: 85 FL (ref 79–97)
METHYLMALONATE SERPL-SCNC: 75 NMOL/L (ref 0–378)
MONOCYTES # BLD AUTO: 0.7 X10E3/UL (ref 0.1–0.9)
MONOCYTES NFR BLD AUTO: 7 %
NEUTROPHILS # BLD AUTO: 6.3 X10E3/UL (ref 1.4–7)
NEUTROPHILS NFR BLD AUTO: 63 %
PLATELET # BLD AUTO: 428 X10E3/UL (ref 150–379)
POTASSIUM SERPL-SCNC: 3.9 MMOL/L (ref 3.5–5.2)
PREALB SERPL-MCNC: 15 MG/DL (ref 14–35)
PROT SERPL-MCNC: 7.4 G/DL (ref 6–8.5)
RBC # BLD AUTO: 4.36 X10E6/UL (ref 3.77–5.28)
SODIUM SERPL-SCNC: 140 MMOL/L (ref 134–144)
VIT B1 BLD-SCNC: 91.8 NMOL/L (ref 66.5–200)
WBC # BLD AUTO: 9.9 X10E3/UL (ref 3.4–10.8)

## 2019-05-17 ENCOUNTER — TELEPHONE (OUTPATIENT)
Dept: BARIATRICS/WEIGHT MGMT | Facility: CLINIC | Age: 34
End: 2019-05-17

## 2019-05-17 NOTE — TELEPHONE ENCOUNTER
Pt called stating that she ate four bites of chicken last night since then has been having sharp epigastric pains. Pt denies Vomiting, fever/chills. She states she get the 64oz of fluids and 70+ grams of protein daily. She is 7 weeks post op. Please advise thank you.

## 2019-05-22 ENCOUNTER — HOSPITAL ENCOUNTER (OUTPATIENT)
Dept: GENERAL RADIOLOGY | Facility: HOSPITAL | Age: 34
Discharge: HOME | End: 2019-05-22
Admitting: PHYSICIAN ASSISTANT

## 2019-05-22 ENCOUNTER — HOSPITAL ENCOUNTER (OUTPATIENT)
Dept: ULTRASOUND IMAGING | Facility: HOSPITAL | Age: 34
Discharge: HOME | End: 2019-05-22

## 2019-05-22 DIAGNOSIS — K21.9 GASTROESOPHAGEAL REFLUX DISEASE, ESOPHAGITIS PRESENCE NOT SPECIFIED: ICD-10-CM

## 2019-05-22 DIAGNOSIS — R11.0 NAUSEA: ICD-10-CM

## 2019-05-22 PROCEDURE — 74241: CPT

## 2019-05-22 PROCEDURE — 76705 ECHO EXAM OF ABDOMEN: CPT

## 2019-05-22 PROCEDURE — 0 DIATRIZOATE MEGLUMINE & SODIUM PER 1 ML: Performed by: PHYSICIAN ASSISTANT

## 2019-05-22 RX ADMIN — BARIUM SULFATE 183 ML: 960 POWDER, FOR SUSPENSION ORAL at 09:02

## 2019-05-22 RX ADMIN — Medication 30 ML: at 09:01

## 2019-05-23 ENCOUNTER — TELEPHONE (OUTPATIENT)
Dept: BARIATRICS/WEIGHT MGMT | Facility: CLINIC | Age: 34
End: 2019-05-23

## 2019-05-23 DIAGNOSIS — R10.13 DYSPEPSIA: ICD-10-CM

## 2019-05-23 DIAGNOSIS — R11.0 NAUSEA: ICD-10-CM

## 2019-05-23 DIAGNOSIS — R10.84 GENERALIZED POSTPRANDIAL ABDOMINAL PAIN: Primary | ICD-10-CM

## 2019-05-23 NOTE — TELEPHONE ENCOUNTER
Pt notified that GBUS unremarkable and UGI showing reflux. Pt states that her nausea has gotten better but she is still having the Upper Abd. Pain. Pt verbalized her understanding in regards to her results and that additional gallbladder studies can be ordered for further eval.

## 2019-06-26 ENCOUNTER — OFFICE VISIT (OUTPATIENT)
Dept: BARIATRICS/WEIGHT MGMT | Facility: CLINIC | Age: 34
End: 2019-06-26

## 2019-06-26 VITALS
TEMPERATURE: 97.7 F | SYSTOLIC BLOOD PRESSURE: 128 MMHG | DIASTOLIC BLOOD PRESSURE: 66 MMHG | OXYGEN SATURATION: 99 % | WEIGHT: 263.5 LBS | HEART RATE: 64 BPM | BODY MASS INDEX: 42.35 KG/M2 | HEIGHT: 66 IN | RESPIRATION RATE: 18 BRPM

## 2019-06-26 DIAGNOSIS — E88.81 INSULIN RESISTANCE: ICD-10-CM

## 2019-06-26 DIAGNOSIS — K21.9 GASTROESOPHAGEAL REFLUX DISEASE, ESOPHAGITIS PRESENCE NOT SPECIFIED: ICD-10-CM

## 2019-06-26 DIAGNOSIS — G93.2 PSEUDOTUMOR CEREBRI: ICD-10-CM

## 2019-06-26 DIAGNOSIS — Z98.84 STATUS POST BARIATRIC SURGERY: ICD-10-CM

## 2019-06-26 DIAGNOSIS — E55.9 HYPOVITAMINOSIS D: ICD-10-CM

## 2019-06-26 DIAGNOSIS — R60.0 LOWER EXTREMITY EDEMA: ICD-10-CM

## 2019-06-26 DIAGNOSIS — Z90.3 POSTGASTRECTOMY MALABSORPTION: ICD-10-CM

## 2019-06-26 DIAGNOSIS — R53.83 FATIGUE, UNSPECIFIED TYPE: Primary | ICD-10-CM

## 2019-06-26 DIAGNOSIS — Z13.21 MALNUTRITION SCREEN: ICD-10-CM

## 2019-06-26 DIAGNOSIS — I10 HYPERTENSION, UNSPECIFIED TYPE: ICD-10-CM

## 2019-06-26 DIAGNOSIS — K91.2 POSTGASTRECTOMY MALABSORPTION: ICD-10-CM

## 2019-06-26 DIAGNOSIS — Z13.0 SCREENING, IRON DEFICIENCY ANEMIA: ICD-10-CM

## 2019-06-26 DIAGNOSIS — E28.2 PCOS (POLYCYSTIC OVARIAN SYNDROME): ICD-10-CM

## 2019-06-26 DIAGNOSIS — F41.9 ANXIETY: ICD-10-CM

## 2019-06-26 PROCEDURE — 99214 OFFICE O/P EST MOD 30 MIN: CPT | Performed by: SURGERY

## 2019-06-26 RX ORDER — SERTRALINE HYDROCHLORIDE 100 MG/1
100 TABLET, FILM COATED ORAL DAILY
Refills: 2 | COMMUNITY
Start: 2019-06-20 | End: 2021-07-05

## 2019-06-26 NOTE — PROGRESS NOTES
[facility]  [address]  [facility] [id number] [phone]        Patient Name:  Deeipka Gonzalez.  :  1985      Date of Visit: 2019      Reason for Visit:   3 months postop     HPI: Deepika Gonzalez is a 33 y.o. female s/p LSG by  on 3/27/19    Workup for epigastric pain since LOV:  19 UGI:  Reflux, no HH  19 GBUS: no stones  HIDA ordered, not yet done.  Actually feels better.  No further pain.  Thinks she may have been learning how to eat.      Doing well.  No issues/concerns. Denies dysphagia, nausea, vomiting and abdominal pain.  Reflux some, but better after surgeyr.  Getting 80-100g prot/day.  Hair is shedding.  Not taking biotin.  Drinking 64 fluid oz/day.  1 month labs revealed low iron, prealbumin Did not receive letter to take extra iron.  Taking MVI, B12, B1, Calcium, Vit D, iron and Vit C.  On Omeprazole  and Actigall .  Exercise: , cardio + weights.    Unsure of calories: mostly protein shakes and eggs.     Presurgery weight: 310 pounds.  Today's weight is 120 kg (263 lb 8 oz) pounds, today's  Body mass index is 43.18 kg/m²., and her weight loss since surgery is 47 pounds.      Pseudotumor cerebri: stable on diamox  Anxiety and depression: better on Zoloft  HTN: stable on amlodopine    Past Medical History:   Diagnosis Date   • Abnormal PFTs     restrictive   • Allergic rhinitis    • Anxiety     seeing therapist   • Dental crown present     lower left    • Dyspepsia    • Fatigue    • GERD (gastroesophageal reflux disease)     has been on PPI in the past, improved recently- no longer on antacids. EGD , no h/o h pylori.    • History of headache     sinus   • History of palpitations     due to caffeine inake in the past    • HTN (hypertension)    • Hypokalemia     2.9   • Insulin resistance    • Lower extremity edema     water pill daily    • Microcytic red blood cells    • Morbid obesity with BMI  of 50.0-59.9, adult (CMS/[facility])    • Paresthesias     of hands and feet, thought to be 2/2 diamox   • PCOS (polycystic ovarian syndrome)     on metformin for it    • Pseudotumor cerebri     followed by opthalmologist- pending neurology specialist.  Has HA, on diamox.    • Seasonal allergies    • Thrombocytosis (CMS/HCC)    • Wears glasses    • Wears glasses    • Wheezing 2008    in the past due to allergies, improved now. No longer uses inhaler.      Past Surgical History:   Procedure Laterality Date   • EAR TUBES  1995    x 3   • ENDOSCOPY  2005    Dr Hay in [facility],  showed reflux   • ENDOSCOPY  8/2/2018    Procedure: ESOPHAGOGASTRODUODENOSCOPY WITH BIOPSY;  Surgeon: Daquan Martinez MD;  Location:  [facility];  Service: Gastroenterology   • ENDOSCOPY N/A 3/27/2019    Procedure: ESOPHAGOGASTRODUODENOSCOPY;  Surgeon: Daquan Martinez MD;  Location:  [facility];  Service: Bariatric   • GASTRIC SLEEVE LAPAROSCOPIC N/A 3/27/2019    Procedure: GASTRIC SLEEVE LAPAROSCOPIC;  Surgeon: Daquan Martinez MD;  Location:  [facility];  Service: Bariatric     Outpatient Medications Marked as Taking for the 6/26/19 encounter (Office Visit) with Sarina Hendricks MD   Medication Sig Dispense Refill   • acetaZOLAMIDE (DIAMOX) 500 MG capsule Take 500 mg by mouth Daily.     • amLODIPine (NORVASC) 5 MG tablet Take 5 mg by mouth Daily.  5   • busPIRone (BUSPAR) 15 MG tablet Take 15 mg by mouth 2 (Two) Times a Day As Needed.     • Cholecalciferol (VITAMIN D3) 5000 units capsule capsule Take 5,000 Units by mouth Daily.     • Multiple Vitamin (MULTI-DAY PO) Take 1 tablet by mouth Daily.     • omeprazole (priLOSEC) 40 MG capsule Take 1 capsule by mouth 2 (Two) Times a Day. 60 capsule 2   • sertraline (ZOLOFT) 100 MG tablet Take 100 mg by mouth Daily.  2   • [DISCONTINUED] ondansetron (ZOFRAN) 4 MG tablet Take 1 tablet by mouth Every 4 (Four) Hours As Needed for Nausea. 20 tablet 0   • [DISCONTINUED] prochlorperazine  "(COMPAZINE) 10 MG tablet Take 1 tablet by mouth Every 6 (Six) Hours As Needed for Nausea or Vomiting. 20 tablet 0       No Known Allergies    Social History     Socioeconomic History   • Marital status: Single     Spouse name: Not on file   • Number of children: Not on file   • Years of education: Masters Degree    • Highest education level: Not on file   Occupational History   • Occupation:    Tobacco Use   • Smoking status: Never Smoker   • Smokeless tobacco: Never Used   Substance and Sexual Activity   • Alcohol use: Yes     Comment: very rarely    • Drug use: No   • Sexual activity: Defer     Comment: no hormones   Social History Narrative    Lives in [place] with mother.  Works in [employer], travels a lot.        /66 (BP Location: Left arm, Patient Position: Sitting, Cuff Size: Large Adult)   Pulse 64   Temp 97.7 °F (36.5 °C) (Temporal)   Resp 18   Ht 166.4 cm (65.5\")   Wt 120 kg (263 lb 8 oz)   SpO2 99%   BMI 43.18 kg/m²     Physical Exam   Constitutional: She is oriented to person, place, and time. She appears well-developed and well-nourished. No distress.   HENT:   Head: Normocephalic and atraumatic.   Mouth/Throat: No oropharyngeal exudate.   Eyes: Conjunctivae and EOM are normal. Pupils are equal, round, and reactive to light.   Pulmonary/Chest: Effort normal. No respiratory distress.   Abdominal: Soft. She exhibits no distension.   Neurological: She is alert and oriented to person, place, and time. No cranial nerve deficit.   Skin: Skin is warm and dry. She is not diaphoretic. No pallor.   Psychiatric: She has a normal mood and affect. Her behavior is normal. Thought content normal.         Assessment:  3 months s/p LSG by  on 3/27/19      ICD-10-CM ICD-9-CM   1. Fatigue, unspecified type R53.83 780.79   2. Hypovitaminosis D E55.9 268.9   3. Postgastrectomy malabsorption K91.2 579.3    Z90.3    4. Screening, iron deficiency anemia Z13.0 V78.0   5. Malnutrition " screen Z13.21 V77.2   6. Gastroesophageal reflux disease, esophagitis presence not specified K21.9 530.81   7. Status post bariatric surgery Z98.84 V45.86   8. Pseudotumor cerebri G93.2 348.2   9. PCOS (polycystic ovarian syndrome) E28.2 256.4   10. Hypertension, unspecified type I10 401.9   11. Insulin resistance E88.81 277.7   12. Anxiety F41.9 300.00   13. Lower extremity edema R60.0 782.3         Plan:  Doing well. Continue w/ good food choices and healthy habits.  Continue protein >70g/day.  Continue routine exercise.  Routine bariatric labs ordered.  Continue vitamins w/ adjustments pending lab results.  Call w/ problems/concerns.  Increase calories to [phone].      The patient was instructed to follow up in 3 months, sooner if needed.    Will arrest gallbladder workup as epigastric pain was better.  If recurs, will proceed with HIDA.    Start biotin for hair loss, 20,000 mcg/day.    note: approx 15 of the 25 minute visit was spent counseling on nutrition and necessary dietary/lifestyle modifications.    Sarina Hendricks MD

## 2019-06-30 LAB
25(OH)D3+25(OH)D2 SERPL-MCNC: 55.6 NG/ML (ref 30–100)
ALBUMIN SERPL-MCNC: 3.9 G/DL (ref 3.5–5.2)
ALBUMIN/GLOB SERPL: 1.1 G/DL
ALP SERPL-CCNC: 80 U/L (ref 39–117)
ALT SERPL-CCNC: 10 U/L (ref 1–33)
AST SERPL-CCNC: 9 U/L (ref 1–32)
BASOPHILS # BLD AUTO: 0.08 10*3/MM3 (ref 0–0.2)
BASOPHILS NFR BLD AUTO: 0.9 % (ref 0–1.5)
BILIRUB SERPL-MCNC: 0.7 MG/DL (ref 0.2–1.2)
BUN SERPL-MCNC: 17 MG/DL (ref 6–20)
BUN/CREAT SERPL: 20.5 (ref 7–25)
CALCIUM SERPL-MCNC: 9.1 MG/DL (ref 8.6–10.5)
CHLORIDE SERPL-SCNC: 108 MMOL/L (ref 98–107)
CO2 SERPL-SCNC: 20.1 MMOL/L (ref 22–29)
CREAT SERPL-MCNC: 0.83 MG/DL (ref 0.57–1)
EOSINOPHIL # BLD AUTO: 0.14 10*3/MM3 (ref 0–0.4)
EOSINOPHIL NFR BLD AUTO: 1.5 % (ref 0.3–6.2)
ERYTHROCYTE [DISTWIDTH] IN BLOOD BY AUTOMATED COUNT: 17.7 % (ref 12.3–15.4)
FERRITIN SERPL-MCNC: 46.6 NG/ML (ref 13–150)
FOLATE SERPL-MCNC: 11.5 NG/ML (ref 4.78–24.2)
GLOBULIN SER CALC-MCNC: 3.4 GM/DL
GLUCOSE SERPL-MCNC: 85 MG/DL (ref 65–99)
HCT VFR BLD AUTO: 43 % (ref 34–46.6)
HGB BLD-MCNC: 13 G/DL (ref 12–15.9)
IMM GRANULOCYTES # BLD AUTO: 0.02 10*3/MM3 (ref 0–0.05)
IMM GRANULOCYTES NFR BLD AUTO: 0.2 % (ref 0–0.5)
IRON SERPL-MCNC: 61 MCG/DL (ref 37–145)
LYMPHOCYTES # BLD AUTO: 2.07 10*3/MM3 (ref 0.7–3.1)
LYMPHOCYTES NFR BLD AUTO: 22.7 % (ref 19.6–45.3)
Lab: NORMAL
MCH RBC QN AUTO: 28.5 PG (ref 26.6–33)
MCHC RBC AUTO-ENTMCNC: 30.2 G/DL (ref 31.5–35.7)
MCV RBC AUTO: 94.3 FL (ref 79–97)
METHYLMALONATE SERPL-SCNC: 241 NMOL/L (ref 0–378)
MONOCYTES # BLD AUTO: 0.51 10*3/MM3 (ref 0.1–0.9)
MONOCYTES NFR BLD AUTO: 5.6 % (ref 5–12)
NEUTROPHILS # BLD AUTO: 6.3 10*3/MM3 (ref 1.7–7)
NEUTROPHILS NFR BLD AUTO: 69.1 % (ref 42.7–76)
PLATELET # BLD AUTO: 400 10*3/MM3 (ref 140–450)
POTASSIUM SERPL-SCNC: 4.2 MMOL/L (ref 3.5–5.2)
PREALB SERPL-MCNC: 22 MG/DL (ref 14–35)
PROT SERPL-MCNC: 7.3 G/DL (ref 6–8.5)
RBC # BLD AUTO: 4.56 10*6/MM3 (ref 3.77–5.28)
SODIUM SERPL-SCNC: 140 MMOL/L (ref 136–145)
VIT B1 BLD-SCNC: 121.4 NMOL/L (ref 66.5–200)
WBC # BLD AUTO: 9.12 10*3/MM3 (ref 3.4–10.8)

## 2019-07-24 ENCOUNTER — TELEPHONE (OUTPATIENT)
Dept: BARIATRICS/WEIGHT MGMT | Facility: CLINIC | Age: 34
End: 2019-07-24

## 2019-07-24 NOTE — TELEPHONE ENCOUNTER
Pt called in with concerns about hair loss.  Pt is taking 20,000 mcg Biotin and I also told pt to make sure she is focusing on 100+ grams of protein daily and to use the shampoo Nioxin. Also, pt is worried about not losing as much weight, and was worried she is not taking in enough calories, and wants to do a BMR. I sent pt to front to schedule a f/up visit in our office to discuss these things further. Pt stated she will try to come in if possible, but pt has lost her job and does not have insurance at this time. Please advise anything further. Thank you.

## 2019-07-25 NOTE — TELEPHONE ENCOUNTER
I let pt know that you can see some hair loss with stress of surgery and rapid weightloss during this time period, will hopefully start to see regrowth in next few months. Told pt to make sure she focuses on protein 100g+ daily, cont biotin. Target calories 9049-7725 with regular meals/ high protein focused/ low carb/ low sugar. I let her know that if she needed to come in sooner than her appointment date, she can. Pt verbalized understanding.

## 2019-08-27 ENCOUNTER — OFFICE VISIT (OUTPATIENT)
Dept: BARIATRICS/WEIGHT MGMT | Facility: CLINIC | Age: 34
End: 2019-08-27

## 2019-08-27 VITALS
RESPIRATION RATE: 18 BRPM | WEIGHT: 251.5 LBS | HEART RATE: 77 BPM | SYSTOLIC BLOOD PRESSURE: 128 MMHG | DIASTOLIC BLOOD PRESSURE: 80 MMHG | HEIGHT: 66 IN | TEMPERATURE: 97.2 F | BODY MASS INDEX: 40.42 KG/M2 | OXYGEN SATURATION: 99 %

## 2019-08-27 DIAGNOSIS — E66.01 MORBID OBESITY (HCC): Primary | ICD-10-CM

## 2019-08-27 PROCEDURE — 94690 O2 UPTK REST INDIRECT: CPT | Performed by: PHYSICIAN ASSISTANT

## 2019-08-27 PROCEDURE — 99214 OFFICE O/P EST MOD 30 MIN: CPT | Performed by: PHYSICIAN ASSISTANT

## 2019-08-27 RX ORDER — CLONAZEPAM 0.5 MG/1
TABLET ORAL
Refills: 0 | COMMUNITY
Start: 2019-06-20 | End: 2021-07-05

## 2019-08-27 NOTE — PROGRESS NOTES
[facility] Bariatric Surgery  [address]  [facility] [id number] [phone]        Patient Name:  Deepika Gonzalez.  :  1985      Date of Visit: 2019      Reason for Visit:   weight loss concerns - 5 months postop    HPI: Deepika Gonzalez is a 34 y.o. female s/p LSG w/ GDW on 3/27/19     Feels weight loss stalled around 3 months postop - wants to discuss further.  Feels she is doing everything she is supposed to but is not seeing the weight loss that she expected.  Was hoping to lose 100lbs in the first 6 months.  Focusing on high protein, getting 90g/day.  Working w/ a , but has recently scaled down her exercise thinking that she may be over-doing-it.  Strives for 5511-9148 seferino/day but admits that she typically averages 1200/day.  No other issues/concerns today.  Last labs 19 - WNL.      Presurgery weight: 310 pounds.  Today's weight is 114 kg (251 lb 8 oz) pounds, today's  Body mass index is 41.22 kg/m²., and her weight loss since surgery is 59 pounds.  Personal goal: 160-170 lbs.     Past Medical History:   Diagnosis Date   • Abnormal PFTs     restrictive   • Allergic rhinitis    • Anxiety     seeing therapist   • Dental crown present     lower left    • Dyspepsia    • Fatigue    • GERD (gastroesophageal reflux disease)     has been on PPI in the past, improved recently- no longer on antacids. EGD , no h/o h pylori.    • History of headache     sinus   • History of palpitations     due to caffeine inake in the past    • HTN (hypertension)    • Hypokalemia     2.9   • Insulin resistance    • Lower extremity edema     water pill daily    • Microcytic red blood cells    • Morbid obesity with BMI of 50.0-59.9, adult (CMS/HCC)    • Paresthesias     of hands and feet, thought to be 2/2 diamox   • PCOS (polycystic ovarian syndrome)     on metformin for it    • Pseudotumor cerebri     followed by opthalmologist- pending neurology specialist.   Has HA, on diamox.    • Seasonal allergies    • Thrombocytosis (CMS/HCC)    • Wears glasses    • Wears glasses    • Wheezing 2008    in the past due to allergies, improved now. No longer uses inhaler.      Past Surgical History:   Procedure Laterality Date   • EAR TUBES  1995    x 3   • ENDOSCOPY  2005    Dr Hay in [facility],  showed reflux   • ENDOSCOPY  8/2/2018    Procedure: ESOPHAGOGASTRODUODENOSCOPY WITH BIOPSY;  Surgeon: Daquan Martinez MD;  Location:  [facility];  Service: Gastroenterology   • ENDOSCOPY N/A 3/27/2019    Procedure: ESOPHAGOGASTRODUODENOSCOPY;  Surgeon: Daquan Martinez MD;  Location:  [facility];  Service: Bariatric   • GASTRIC SLEEVE LAPAROSCOPIC N/A 3/27/2019    Procedure: GASTRIC SLEEVE LAPAROSCOPIC;  Surgeon: Daquan Martinez MD;  Location:  [facility];  Service: Bariatric     Outpatient Medications Marked as Taking for the 8/27/19 encounter (Office Visit) with Marium Rojas PA   Medication Sig Dispense Refill   • acetaZOLAMIDE (DIAMOX) 500 MG capsule Take 500 mg by mouth Daily.     • Cholecalciferol (VITAMIN D3) 5000 units capsule capsule Take 5,000 Units by mouth Daily.     • clonazePAM (KlonoPIN) 0.5 MG tablet TAKE 1/2 TABLET TO 1 TABLET BY MOUTH EVERY DAY AS NEEDED  0   • Multiple Vitamin (MULTI-DAY PO) Take 1 tablet by mouth Daily.     • omeprazole (priLOSEC) 40 MG capsule Take 1 capsule by mouth 2 (Two) Times a Day. 60 capsule 2   • sertraline (ZOLOFT) 100 MG tablet Take 100 mg by mouth Daily.  2       No Known Allergies    Social History     Socioeconomic History   • Marital status: Single     Spouse name: Not on file   • Number of children: Not on file   • Years of education: Masters Degree    • Highest education level: Not on file   Occupational History   • Occupation:    Tobacco Use   • Smoking status: Never Smoker   • Smokeless tobacco: Never Used   Substance and Sexual Activity   • Alcohol use: Yes     Comment: very rarely    • Drug use:  "No   • Sexual activity: Defer     Comment: no hormones   Social History Narrative    Lives in [place] with mother.  Works in , travels a lot.        /80 (BP Location: Left arm, Patient Position: Sitting, Cuff Size: Large Adult)   Pulse 77   Temp 97.2 °F (36.2 °C) (Temporal)   Resp 18   Ht 166.4 cm (65.5\")   Wt 114 kg (251 lb 8 oz)   SpO2 99%   BMI 41.22 kg/m²     Physical Exam   Constitutional: She appears well-developed and well-nourished.   Cardiovascular: Normal rate.   Pulmonary/Chest: Effort normal.   Musculoskeletal: Normal range of motion.   Neurological: She is alert.   Psychiatric: She has a normal mood and affect. Judgment and thought content normal.         Assessment:  5 months s/p LSG w/ GDW on 3/27/19     ICD-10-CM ICD-9-CM   1. Morbid obesity (CMS/HCC) E66.01 278.01       Plan:  Discussed IBW and EWL goals.  Reassured patient.  BMR to determine WLZ.  Encouraged to keep tracking intake.  Focus on high protein, low carb, good food choices.  Adjust calories according to BMR.  Referred to dietitian today to discuss healthy ways to increase daily caloric intake.  Continue routine exercise.  Call w/ any other problems/concerns.     The patient was instructed to follow up in 1 month, as scheduled, sooner if needed.          "

## 2019-09-03 ENCOUNTER — DOCUMENTATION (OUTPATIENT)
Dept: BARIATRICS/WEIGHT MGMT | Facility: CLINIC | Age: 34
End: 2019-09-03

## 2019-09-03 NOTE — PROGRESS NOTES
Patient is ~ 5 mo post LSG surgery. She requested an RD consult to ensure that she is on the right track with her eating pattern. She c/o epigastric  Pain & had GI studies done in May, 2019.  No new dx resulted & pt began to feel better. She was concerned that she was not losing wt at the expected rate, but now is satisfied that she is averaging 1-2 # loss q wk.  She is trying hard to do everything right, she states.  She eats protein foods in 3 meals + 2-3 snks qd in order to meet needs.  Tracking indicates intake of ingestion of adeq # of kcal - in range of BMR wt loss results.  Reviewed principles of diet for wt mgt post LSG and determined that pt is following them.  Affirm her for successes & offer continued support as needed.

## 2020-04-29 ENCOUNTER — TELEMEDICINE (OUTPATIENT)
Dept: BARIATRICS/WEIGHT MGMT | Facility: CLINIC | Age: 35
End: 2020-04-29

## 2020-04-29 DIAGNOSIS — R60.0 LOWER EXTREMITY EDEMA: ICD-10-CM

## 2020-04-29 DIAGNOSIS — E55.9 HYPOVITAMINOSIS D: ICD-10-CM

## 2020-04-29 DIAGNOSIS — I10 HYPERTENSION, UNSPECIFIED TYPE: ICD-10-CM

## 2020-04-29 DIAGNOSIS — R53.83 FATIGUE, UNSPECIFIED TYPE: Primary | ICD-10-CM

## 2020-04-29 DIAGNOSIS — Z13.0 SCREENING, IRON DEFICIENCY ANEMIA: ICD-10-CM

## 2020-04-29 DIAGNOSIS — K21.9 GASTROESOPHAGEAL REFLUX DISEASE, ESOPHAGITIS PRESENCE NOT SPECIFIED: ICD-10-CM

## 2020-04-29 DIAGNOSIS — A04.8 H. PYLORI INFECTION: ICD-10-CM

## 2020-04-29 DIAGNOSIS — Z13.21 MALNUTRITION SCREEN: ICD-10-CM

## 2020-04-29 DIAGNOSIS — E88.81 INSULIN RESISTANCE: ICD-10-CM

## 2020-04-29 DIAGNOSIS — Z98.84 STATUS POST BARIATRIC SURGERY: ICD-10-CM

## 2020-04-29 DIAGNOSIS — G93.2 PSEUDOTUMOR CEREBRI: ICD-10-CM

## 2020-04-29 DIAGNOSIS — E28.2 PCOS (POLYCYSTIC OVARIAN SYNDROME): ICD-10-CM

## 2020-04-29 PROCEDURE — 99214 OFFICE O/P EST MOD 30 MIN: CPT | Performed by: SURGERY

## 2020-04-29 NOTE — PROGRESS NOTES
[facility]  [address]    [facility] [id number] [phone]        Patient Name:  Deepika Gonzalez.  :  1985      Date of Visit: 2020      Reason for Visit:   1 years postop      HPI: Deepika Gonzalez is a 34 y.o. female s/p  LSG w/ GDW on 3/27/19     She struggles with constipation, and tries to eat vegetables.  She has been taking Dulcolax pills and Miralax for about 10 days.  Takes miralax 1x per day with no success.      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    Doing well.  No major issues/concerns. Denies dysphagia, reflux, nausea, vomiting and abdominal pain.  Occasionally need Tums for reflux, but it very infrequent.  Getting 80+g prot/day.  Drinking 64+ fluid oz/day. Last labs revealed no deficiencies. Taking MVI, Vit D, Vit C and calc/mag/zinc.  Exercise: jogging.  Has already logged 10 miles jogged this week.     Presurgery weight: 310 pounds.  Today's weight is 215 pounds, today's  There is no height or weight on file to calculate BMI., andweight loss since surgery is 95 pounds.      Past Medical History:   Diagnosis Date   • Abnormal PFTs     restrictive   • Allergic rhinitis    • Anxiety     seeing therapist   • Dental crown present     lower left    • Dyspepsia    • Fatigue    • GERD (gastroesophageal reflux disease)     has been on PPI in the past, improved recently- no longer on antacids. EGD , no h/o h pylori.    • History of headache     sinus   • History of palpitations     due to caffeine inake in the past    • HTN (hypertension)    • Hypokalemia     2.9   • Insulin resistance    • Lower extremity edema     water pill daily    • Microcytic red blood cells    • Morbid obesity with BMI of 50.0-59.9, adult (CMS/HCC)    • Paresthesias     of hands and feet, thought to be 2/2 diamox   • PCOS (polycystic ovarian syndrome)     on metformin for it    • Pseudotumor cerebri     followed by opthalmologist-  pending neurology specialist.  Has HA, on diamox.    • Seasonal allergies    • Thrombocytosis (CMS/HCC)    • Wears glasses    • Wears glasses    • Wheezing 2008    in the past due to allergies, improved now. No longer uses inhaler.      Past Surgical History:   Procedure Laterality Date   • EAR TUBES  1995    x 3   • ENDOSCOPY  2005    Dr Hay in [facility],  showed reflux   • ENDOSCOPY  8/2/2018    Procedure: ESOPHAGOGASTRODUODENOSCOPY WITH BIOPSY;  Surgeon: Daquan Martinez MD;  Location:  [facility];  Service: Gastroenterology   • ENDOSCOPY N/A 3/27/2019    Procedure: ESOPHAGOGASTRODUODENOSCOPY;  Surgeon: Daquan Martinez MD;  Location:  [facility];  Service: Bariatric   • GASTRIC SLEEVE LAPAROSCOPIC N/A 3/27/2019    Procedure: GASTRIC SLEEVE LAPAROSCOPIC;  Surgeon: Daquan Martinez MD;  Location:  [facility];  Service: Bariatric     No outpatient medications have been marked as taking for the 4/29/20 encounter (Telemedicine) with Sarina Hendricks MD.       No Known Allergies    Social History     Socioeconomic History   • Marital status: Single     Spouse name: Not on file   • Number of children: Not on file   • Years of education: Masters Degree    • Highest education level: Not on file   Occupational History   • Occupation:    Tobacco Use   • Smoking status: Never Smoker   • Smokeless tobacco: Never Used   Substance and Sexual Activity   • Alcohol use: Yes     Comment: very rarely    • Drug use: No   • Sexual activity: Defer     Comment: no hormones   Social History Narrative    Lives in [place] [facility] with [place].  Works in , travels a lot.        There were no vitals taken for this visit.    Physical Exam   Constitutional: She is oriented to person, place, and time. She appears well-developed and well-nourished. No distress.   HENT:   Head: Normocephalic and atraumatic.   Nose: Nose normal.   Mouth/Throat: No oropharyngeal exudate.   Eyes: Pupils are equal, round, and  reactive to light. Conjunctivae and EOM are normal. No scleral icterus.   Pulmonary/Chest: Effort normal. No respiratory distress.   Neurological: She is alert and oriented to person, place, and time.   Skin: Skin is dry. She is not diaphoretic. No pallor.   Psychiatric: She has a normal mood and affect. Her behavior is normal. Judgment and thought content normal.         Assessment:  1 years s/p  LSG w/ GDW on 3/27/19       ICD-10-CM ICD-9-CM   1. Fatigue, unspecified type R53.83 780.79   2. Malnutrition screen Z13.21 V77.2   3. Hypovitaminosis D E55.9 268.9   4. Screening, iron deficiency anemia Z13.0 V78.0   5. Status post bariatric surgery Z98.84 V45.86   6. H. pylori infection A04.8 041.86   7. Pseudotumor cerebri G93.2 348.2   8. PCOS (polycystic ovarian syndrome) E28.2 256.4   9. Gastroesophageal reflux disease, esophagitis presence not specified K21.9 530.81   10. Lower extremity edema R60.0 782.3   11. Hypertension, unspecified type I10 401.9   12. Insulin resistance E88.81 277.7         Plan:  Doing well. Continue w/ good food choices and healthy habits.  Continue protein >70g/day.  Continue routine exercise.  Routine bariatric labs ordered and mailed to patient.  Continue vitamins w/ adjustments pending lab results.  Call w/ problems/concerns.     The patient was instructed to follow up in 6 months, sooner if needed.    This visit was conducted as a video visit, in an effort to limit spread of the novel coronavirus during the 2020 pandemic.        note: approx 15 of the 25 minute visit was spent counseling on nutrition and necessary dietary/lifestyle modifications face to face.    Sarina Hendricks MD

## 2020-07-28 ENCOUNTER — HOSPITAL ENCOUNTER (OUTPATIENT)
Dept: URGENT CARE | Facility: CLINIC | Age: 35
Discharge: HOME | End: 2020-07-28
Attending: FAMILY MEDICINE

## 2020-07-30 LAB — SARS-COV-2 RNA SPEC QL NAA+PROBE: NOT DETECTED

## 2020-08-11 ENCOUNTER — HOSPITAL ENCOUNTER (OUTPATIENT)
Dept: LAB | Facility: HOSPITAL | Age: 35
Discharge: HOME | End: 2020-08-11
Attending: FAMILY MEDICINE

## 2020-08-11 LAB
25(OH)D3 SERPL-MCNC: 57.6 NG/ML (ref 30–100)
ALBUMIN SERPL-MCNC: 4.2 G/DL (ref 3.5–5)
ALBUMIN/GLOB SERPL: 1.4 {RATIO} (ref 1.4–2.6)
ALP SERPL-CCNC: 65 U/L (ref 42–98)
ALT SERPL-CCNC: 17 U/L (ref 10–40)
ANION GAP SERPL CALC-SCNC: 19 MMOL/L (ref 8–19)
AST SERPL-CCNC: 20 U/L (ref 15–50)
BASOPHILS # BLD AUTO: 0.07 10*3/UL (ref 0–0.2)
BASOPHILS NFR BLD AUTO: 1.4 % (ref 0–3)
BILIRUB SERPL-MCNC: 1.07 MG/DL (ref 0.2–1.3)
BUN SERPL-MCNC: 14 MG/DL (ref 5–25)
BUN/CREAT SERPL: 16 {RATIO} (ref 6–20)
CALCIUM SERPL-MCNC: 9.7 MG/DL (ref 8.7–10.4)
CHLORIDE SERPL-SCNC: 102 MMOL/L (ref 99–111)
CHOLEST SERPL-MCNC: 178 MG/DL (ref 107–200)
CHOLEST/HDLC SERPL: 2 {RATIO} (ref 3–6)
CONV ABS IMM GRAN: 0.01 10*3/UL (ref 0–0.2)
CONV CO2: 22 MMOL/L (ref 22–32)
CONV IMMATURE GRAN: 0.2 % (ref 0–1.8)
CONV TOTAL PROTEIN: 7.2 G/DL (ref 6.3–8.2)
CREAT UR-MCNC: 0.88 MG/DL (ref 0.5–0.9)
DEPRECATED RDW RBC AUTO: 45.5 FL (ref 36.4–46.3)
EOSINOPHIL # BLD AUTO: 0.12 10*3/UL (ref 0–0.7)
EOSINOPHIL # BLD AUTO: 2.4 % (ref 0–7)
ERYTHROCYTE [DISTWIDTH] IN BLOOD BY AUTOMATED COUNT: 12.9 % (ref 11.7–14.4)
GFR SERPLBLD BASED ON 1.73 SQ M-ARVRAT: >60 ML/MIN/{1.73_M2}
GLOBULIN UR ELPH-MCNC: 3 G/DL (ref 2–3.5)
GLUCOSE SERPL-MCNC: 88 MG/DL (ref 65–99)
HCT VFR BLD AUTO: 40.5 % (ref 37–47)
HDLC SERPL-MCNC: 89 MG/DL (ref 40–60)
HGB BLD-MCNC: 12.9 G/DL (ref 12–16)
LDLC SERPL CALC-MCNC: 82 MG/DL (ref 70–100)
LYMPHOCYTES # BLD AUTO: 2.53 10*3/UL (ref 1–5)
LYMPHOCYTES NFR BLD AUTO: 50 % (ref 20–45)
MCH RBC QN AUTO: 30.7 PG (ref 27–31)
MCHC RBC AUTO-ENTMCNC: 31.9 G/DL (ref 33–37)
MCV RBC AUTO: 96.4 FL (ref 81–99)
MONOCYTES # BLD AUTO: 0.34 10*3/UL (ref 0.2–1.2)
MONOCYTES NFR BLD AUTO: 6.7 % (ref 3–10)
NEUTROPHILS # BLD AUTO: 1.99 10*3/UL (ref 2–8)
NEUTROPHILS NFR BLD AUTO: 39.3 % (ref 30–85)
NRBC CBCN: 0 % (ref 0–0.7)
OSMOLALITY SERPL CALC.SUM OF ELEC: 288 MOSM/KG (ref 273–304)
PLATELET # BLD AUTO: 310 10*3/UL (ref 130–400)
PMV BLD AUTO: 9.2 FL (ref 9.4–12.3)
POTASSIUM SERPL-SCNC: 4.3 MMOL/L (ref 3.5–5.3)
RBC # BLD AUTO: 4.2 10*6/UL (ref 4.2–5.4)
SODIUM SERPL-SCNC: 139 MMOL/L (ref 135–147)
TRIGL SERPL-MCNC: 34 MG/DL (ref 40–150)
VLDLC SERPL-MCNC: 7 MG/DL (ref 5–37)
WBC # BLD AUTO: 5.06 10*3/UL (ref 4.8–10.8)

## 2021-09-03 ENCOUNTER — TELEPHONE (OUTPATIENT)
Dept: OBSTETRICS AND GYNECOLOGY | Facility: CLINIC | Age: 36
End: 2021-09-03

## 2021-11-29 ENCOUNTER — OFFICE VISIT (OUTPATIENT)
Dept: OBSTETRICS AND GYNECOLOGY | Facility: CLINIC | Age: 36
End: 2021-11-29

## 2021-11-29 VITALS
SYSTOLIC BLOOD PRESSURE: 127 MMHG | WEIGHT: 237.4 LBS | DIASTOLIC BLOOD PRESSURE: 73 MMHG | BODY MASS INDEX: 39.51 KG/M2 | HEART RATE: 82 BPM

## 2021-11-29 DIAGNOSIS — Z30.011 ENCOUNTER FOR INITIAL PRESCRIPTION OF CONTRACEPTIVE PILLS: ICD-10-CM

## 2021-11-29 DIAGNOSIS — Z11.3 SCREEN FOR STD (SEXUALLY TRANSMITTED DISEASE): ICD-10-CM

## 2021-11-29 DIAGNOSIS — Z01.419 ENCOUNTER FOR GYNECOLOGICAL EXAMINATION: Primary | ICD-10-CM

## 2021-11-29 PROCEDURE — 99385 PREV VISIT NEW AGE 18-39: CPT | Performed by: OBSTETRICS & GYNECOLOGY

## 2021-11-29 RX ORDER — ACETAMINOPHEN AND CODEINE PHOSPHATE 120; 12 MG/5ML; MG/5ML
1 SOLUTION ORAL DAILY
Qty: 84 TABLET | Refills: 3 | Status: SHIPPED | OUTPATIENT
Start: 2021-11-29 | End: 2022-01-19

## 2021-11-29 RX ORDER — DEXTROAMPHETAMINE SACCHARATE, AMPHETAMINE ASPARTATE, DEXTROAMPHETAMINE SULFATE AND AMPHETAMINE SULFATE 1.25; 1.25; 1.25; 1.25 MG/1; MG/1; MG/1; MG/1
5 TABLET ORAL DAILY
COMMUNITY
Start: 2021-11-15 | End: 2022-01-19

## 2021-11-29 NOTE — PATIENT INSTRUCTIONS
Breast Self-Awareness  Breast self-awareness is knowing how your breasts look and feel. Doing breast self-awareness is important. It allows you to catch a breast problem early while it is still small and can be treated. All women should do breast self-awareness, including women who have had breast implants. Tell your doctor if you notice a change in your breasts.  What you need:  · A mirror.  · A well-lit room.  How to do a breast self-exam  A breast self-exam is one way to learn what is normal for your breasts and to check for changes. To do a breast self-exam:  Look for changes    1. Take off all the clothes above your waist.  2.  front of a mirror in a room with good lighting.  3. Put your hands on your hips.  4. Push your hands down.  5. Look at your breasts and nipples in the mirror to see if one breast or nipple looks different from the other. Check to see if:  ? The shape of one breast is different.  ? The size of one breast is different.  ? There are wrinkles, dips, and bumps in one breast and not the other.  6. Look at each breast for changes in the skin, such as:  ? Redness.  ? Scaly areas.  7. Look for changes in your nipples, such as:  ? Liquid around the nipples.  ? Bleeding.  ? Dimpling.  ? Redness.  ? A change in where the nipples are.    Feel for changes    1. Lie on your back on the floor.  2. Feel each breast. To do this, follow these steps:  ? Pick a breast to feel.  ? Put the arm closest to that breast above your head.  ? Use your other arm to feel the nipple area of your breast. Feel the area with the pads of your three middle fingers by making small circles with your fingers. For the first Venetie IRA, press lightly. For the second Venetie IRA, press harder. For the third Venetie IRA, press even harder.  ? Keep making circles with your fingers at the different pressures as you move down your breast. Stop when you feel your ribs.  ? Move your fingers a little toward the center of your body.  ? Start  making circles with your fingers again, this time going up until you reach your collarbone.  ? Keep making up-and-down circles until you reach your armpit. Remember to keep using the three pressures.  ? Feel the other breast in the same way.  3. Sit or  the tub or shower.  4. With soapy water on your skin, feel each breast the same way you did in step 2 when you were lying on the floor.    Write down what you find  Writing down what you find can help you remember what to tell your doctor. Write down:  · What is normal for each breast.  · Any changes you find in each breast, including:  ? The kind of changes you find.  ? Whether you have pain.  ? Size and location of any lumps.  · When you last had your menstrual period.  General tips  · Check your breasts every month.  · If you are breastfeeding, the best time to check your breasts is after you feed your baby or after you use a breast pump.  · If you get menstrual periods, the best time to check your breasts is 5-7 days after your menstrual period is over.  · With time, you will become comfortable with the self-exam, and you will begin to know if there are changes in your breasts.  Contact a doctor if you:  · See a change in the shape or size of your breasts or nipples.  · See a change in the skin of your breast or nipples, such as red or scaly skin.  · Have fluid coming from your nipples that is not normal.  · Find a lump or thick area that was not there before.  · Have pain in your breasts.  · Have any concerns about your breast health.  Summary  · Breast self-awareness includes looking for changes in your breasts, as well as feeling for changes within your breasts.  · Breast self-awareness should be done in front of a mirror in a well-lit room.  · You should check your breasts every month. If you get menstrual periods, the best time to check your breasts is 5-7 days after your menstrual period is over.  · Let your doctor know of any changes you see in  your breasts, including changes in size, changes on the skin, pain or tenderness, or fluid from your nipples that is not normal.  This information is not intended to replace advice given to you by your health care provider. Make sure you discuss any questions you have with your health care provider.  Document Revised: 08/06/2019 Document Reviewed: 08/06/2019  [employer] Patient Education © 2021 [employer]

## 2021-11-29 NOTE — PROGRESS NOTES
Chief Complaint  Gynecologic Exam- Pap- Pt states last year.     Subjective          Deepika Gonzalez presents to [facility]  History of Present Illness  Patient is a 36-year-old that presents for gynecological exam.  She is a new patient.  She denies any history of abnormal Pap smears.  She has been diagnosed in the past with pseudotumor cerebri and has been advised to avoid estrogen-containing contraception.  She was started on a progesterone only pill in August by a nurse practitioner and Nicholas.  She stopped it at the end of October because she felt that it was increasing her appetite.  She is interested in restarting the progesterone only pill.  She does report that she had 2 periods in October while on it.  She also sees a therapist and has recently been diagnosed with ADD and has a prescription to start Adderall.  She reports a strong family history of addiction, but no gynecological cancers or breast cancer.  She is currently sexually active with her boyfriend.    Objective   Vital Signs:   /73   Pulse 82   Wt 108 kg (237 lb 6.4 oz)   BMI 39.51 kg/m²     Physical Exam  Vitals reviewed. Exam conducted with a chaperone present.   Constitutional:       Appearance: She is well-developed.   Cardiovascular:      Rate and Rhythm: Normal rate and regular rhythm.   Pulmonary:      Effort: Pulmonary effort is normal.      Breath sounds: Normal breath sounds.   Chest:   Breasts:      Right: No inverted nipple, mass, nipple discharge, skin change or tenderness.      Left: No inverted nipple, mass, nipple discharge, skin change or tenderness.       Abdominal:      General: There is no distension.      Palpations: Abdomen is soft.      Tenderness: There is no abdominal tenderness.   Genitourinary:     Labia:         Right: No rash, tenderness, lesion or injury.         Left: No rash, tenderness, lesion or injury.       Vagina: Normal.      Cervix: Normal.      Uterus: Normal.        Adnexa:         Right: No mass, tenderness or fullness.          Left: No mass, tenderness or fullness.     Neurological:      Mental Status: She is alert.                 Assessment and Plan    Diagnoses and all orders for this visit:    1. Encounter for gynecological examination (Primary)  -     IGP,CtNgTv,rfx Apt HPV All    2. Encounter for initial prescription of contraceptive pills  -     norethindrone (MICRONOR) 0.35 MG tablet; Take 1 tablet by mouth Daily.  Dispense: 84 tablet; Refill: 3    3. Screen for STD (sexually transmitted disease)  -     RPR, Rfx Qn RPR / Confirm TP  -     Hepatitis B Surface Antigen  -     Hepatitis C Antibody  -     HIV-1 / O / 2 Ag / Antibody 4th Generation    Patient was counseled on monthly self breast exams for breast health.  She was counseled on bleeding profile of Micronor and prescription was sent.  She was advised to start it with the beginning of her next cycle.  She may follow-up in 1 year for gynecological exam or sooner if needed.    Follow Up   Return in about 1 year (around 11/29/2022) for gynecological exam.  Patient was given instructions and counseling regarding her condition or for health maintenance advice. Please see specific information pulled into the AVS if appropriate.

## 2021-11-30 LAB
HBV SURFACE AG SERPL QL IA: NEGATIVE
HCV AB S/CO SERPL IA: <0.1 S/CO RATIO (ref 0–0.9)
HIV 1+2 AB+HIV1 P24 AG SERPL QL IA: NON REACTIVE
RPR SER QL: NON REACTIVE

## 2021-12-03 LAB
C TRACH RRNA CVX QL NAA+PROBE: NEGATIVE
CONV .: ABNORMAL
CYTOLOGIST CVX/VAG CYTO: ABNORMAL
CYTOLOGY CVX/VAG DOC CYTO: ABNORMAL
CYTOLOGY CVX/VAG DOC THIN PREP: ABNORMAL
DX ICD CODE: ABNORMAL
DX ICD CODE: ABNORMAL
HIV 1 & 2 AB SER-IMP: ABNORMAL
HPV I/H RISK 4 DNA CVX QL PROBE+SIG AMP: NEGATIVE
N GONORRHOEA RRNA CVX QL NAA+PROBE: NEGATIVE
OTHER STN SPEC: ABNORMAL
PATHOLOGIST CVX/VAG CYTO: ABNORMAL
RECOM F/U CVX/VAG CYTO: ABNORMAL
STAT OF ADQ CVX/VAG CYTO-IMP: ABNORMAL
T VAGINALIS RRNA SPEC QL NAA+PROBE: NEGATIVE

## 2022-01-19 ENCOUNTER — OFFICE VISIT (OUTPATIENT)
Dept: BARIATRICS/WEIGHT MGMT | Facility: CLINIC | Age: 37
End: 2022-01-19

## 2022-01-19 VITALS
HEIGHT: 65 IN | DIASTOLIC BLOOD PRESSURE: 68 MMHG | WEIGHT: 239 LBS | TEMPERATURE: 97.1 F | OXYGEN SATURATION: 99 % | HEART RATE: 78 BPM | RESPIRATION RATE: 18 BRPM | SYSTOLIC BLOOD PRESSURE: 122 MMHG | BODY MASS INDEX: 39.82 KG/M2

## 2022-01-19 DIAGNOSIS — R10.13 DYSPEPSIA: ICD-10-CM

## 2022-01-19 DIAGNOSIS — R12 HEARTBURN: ICD-10-CM

## 2022-01-19 DIAGNOSIS — R63.5 WEIGHT GAIN: Primary | ICD-10-CM

## 2022-01-19 DIAGNOSIS — R79.0 ABNORMAL BLOOD LEVEL OF IRON: ICD-10-CM

## 2022-01-19 DIAGNOSIS — R53.83 FATIGUE, UNSPECIFIED TYPE: ICD-10-CM

## 2022-01-19 DIAGNOSIS — E66.9 OBESITY, CLASS II, BMI 35-39.9: ICD-10-CM

## 2022-01-19 DIAGNOSIS — E55.9 VITAMIN D DEFICIENCY: ICD-10-CM

## 2022-01-19 PROCEDURE — 99214 OFFICE O/P EST MOD 30 MIN: CPT | Performed by: PHYSICIAN ASSISTANT

## 2022-01-19 RX ORDER — NORETHINDRONE ACETATE AND ETHINYL ESTRADIOL .5; 2.5 MG/1; UG/1
1 TABLET ORAL DAILY
COMMUNITY
End: 2022-06-03

## 2022-01-19 RX ORDER — OMEPRAZOLE 40 MG/1
40 CAPSULE, DELAYED RELEASE ORAL DAILY
Qty: 90 CAPSULE | Refills: 0 | Status: SHIPPED | OUTPATIENT
Start: 2022-01-19 | End: 2022-06-03
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
## 2022-01-28 LAB
25(OH)D3+25(OH)D2 SERPL-MCNC: 27.7 NG/ML (ref 30–100)
ALBUMIN SERPL-MCNC: 4.2 G/DL (ref 3.8–4.8)
ALBUMIN/GLOB SERPL: 1.4 {RATIO} (ref 1.2–2.2)
ALP SERPL-CCNC: 55 IU/L (ref 44–121)
ALT SERPL-CCNC: 11 IU/L (ref 0–32)
AST SERPL-CCNC: 17 IU/L (ref 0–40)
BASOPHILS # BLD AUTO: 0.1 X10E3/UL (ref 0–0.2)
BASOPHILS NFR BLD AUTO: 1 %
BILIRUB SERPL-MCNC: 0.7 MG/DL (ref 0–1.2)
BUN SERPL-MCNC: 17 MG/DL (ref 6–20)
BUN/CREAT SERPL: 20 (ref 9–23)
CALCIUM SERPL-MCNC: 8.6 MG/DL (ref 8.7–10.2)
CHLORIDE SERPL-SCNC: 108 MMOL/L (ref 96–106)
CO2 SERPL-SCNC: 20 MMOL/L (ref 20–29)
CREAT SERPL-MCNC: 0.85 MG/DL (ref 0.57–1)
EOSINOPHIL # BLD AUTO: 0.2 X10E3/UL (ref 0–0.4)
EOSINOPHIL NFR BLD AUTO: 3 %
ERYTHROCYTE [DISTWIDTH] IN BLOOD BY AUTOMATED COUNT: 12.7 % (ref 11.7–15.4)
FERRITIN SERPL-MCNC: 14 NG/ML (ref 15–150)
FOLATE SERPL-MCNC: 12 NG/ML
GLOBULIN SER CALC-MCNC: 2.9 G/DL (ref 1.5–4.5)
GLUCOSE SERPL-MCNC: 84 MG/DL (ref 65–99)
HCT VFR BLD AUTO: 37.8 % (ref 34–46.6)
HGB BLD-MCNC: 12.7 G/DL (ref 11.1–15.9)
IMM GRANULOCYTES # BLD AUTO: 0 X10E3/UL (ref 0–0.1)
IMM GRANULOCYTES NFR BLD AUTO: 0 %
IRON SERPL-MCNC: 74 UG/DL (ref 27–159)
LYMPHOCYTES # BLD AUTO: 2.5 X10E3/UL (ref 0.7–3.1)
LYMPHOCYTES NFR BLD AUTO: 35 %
MCH RBC QN AUTO: 30.9 PG (ref 26.6–33)
MCHC RBC AUTO-ENTMCNC: 33.6 G/DL (ref 31.5–35.7)
MCV RBC AUTO: 92 FL (ref 79–97)
METHYLMALONATE SERPL-SCNC: 129 NMOL/L (ref 0–378)
MONOCYTES # BLD AUTO: 0.5 X10E3/UL (ref 0.1–0.9)
MONOCYTES NFR BLD AUTO: 7 %
NEUTROPHILS # BLD AUTO: 3.7 X10E3/UL (ref 1.4–7)
NEUTROPHILS NFR BLD AUTO: 54 %
PLATELET # BLD AUTO: 392 X10E3/UL (ref 150–450)
POTASSIUM SERPL-SCNC: 4.5 MMOL/L (ref 3.5–5.2)
PREALB SERPL-MCNC: 31 MG/DL (ref 14–35)
PROT SERPL-MCNC: 7.1 G/DL (ref 6–8.5)
RBC # BLD AUTO: 4.11 X10E6/UL (ref 3.77–5.28)
SODIUM SERPL-SCNC: 142 MMOL/L (ref 134–144)
TSH SERPL-ACNC: 0.79 UIU/ML (ref 0.45–4.5)
VIT B1 BLD-SCNC: 100.7 NMOL/L (ref 66.5–200)
WBC # BLD AUTO: 7 X10E3/UL (ref 3.4–10.8)

## 2022-06-03 ENCOUNTER — OFFICE VISIT (OUTPATIENT)
Dept: OBSTETRICS AND GYNECOLOGY | Facility: CLINIC | Age: 37
End: 2022-06-03

## 2022-06-03 VITALS
HEART RATE: 80 BPM | SYSTOLIC BLOOD PRESSURE: 107 MMHG | DIASTOLIC BLOOD PRESSURE: 62 MMHG | BODY MASS INDEX: 41.8 KG/M2 | WEIGHT: 251.2 LBS

## 2022-06-03 DIAGNOSIS — Z76.89 ENCOUNTER TO ESTABLISH CARE: ICD-10-CM

## 2022-06-03 DIAGNOSIS — E66.01 MORBID OBESITY WITH BMI OF 40.0-44.9, ADULT: Primary | ICD-10-CM

## 2022-06-03 PROCEDURE — 99213 OFFICE O/P EST LOW 20 MIN: CPT | Performed by: OBSTETRICS & GYNECOLOGY

## 2022-06-03 RX ORDER — MULTIPLE VITAMINS W/ MINERALS TAB 9MG-400MCG
1 TAB ORAL DAILY
COMMUNITY

## 2022-06-03 RX ORDER — ESCITALOPRAM OXALATE 20 MG/1
20 TABLET ORAL DAILY
COMMUNITY
Start: 2022-03-16

## 2022-06-03 NOTE — PROGRESS NOTES
Chief Complaint  Follow-up- Pt wanting to talk about hormones, also wanting to see about working with a nutritionist.     Subjective        Deepika Gonzalez presents to [facility]  History of Present Illness  Patient is a 36-year-old that presents to discuss hormones.  She states that she has recently had difficulty losing weight and has had cravings for sugary snacks.  She did have an appointment with the bariatric office in January and did see their nutritionist.  She was on a progesterone only pill due to history of pseudotumor cerebri and being counseled that she should not be on estrogen containing contraception.  She states that she stopped her progesterone only pill and that her last menstrual period was in May.  She did have concerns at one point that she may be pregnant because she went more than 1 month without a menstrual..  Objective   Vital Signs:  /62   Pulse 80   Wt 114 kg (251 lb 3.2 oz)   BMI 41.80 kg/m²     BMI has not been calculated during today's encounter.       Physical Exam  Vitals reviewed.   Constitutional:       Appearance: Normal appearance.   Neurological:      General: No focal deficit present.      Mental Status: She is alert. Mental status is at baseline.   Psychiatric:         Mood and Affect: Mood normal.         Behavior: Behavior normal.        Result Review :                Assessment and Plan {CC Problem List  Visit Diagnosis   ROS  Review (Popup)  Gendel Maintenance  Quality  BestPractice  Medications  SmartSets  SnapShot Encounters  Media :23}  Diagnoses and all orders for this visit:    1. Morbid obesity with BMI of 40.0-44.9, adult (HCC) (Primary)  -     TSH Rfx On Abnormal To Free T4  -     Follicle Stimulating Hormone  -     Luteinizing Hormone  -     Estradiol  -     Testosterone Free Direct    2. Encounter to establish care  -     Ambulatory Referral to Internal Medicine    Discussed with patient normal fluctuations in  hormones in premenopausal women.  Discussed with her that hormonal contraception is the only medication that would be prescribed by our office to change those fluctuations.  We will check hormones per her request.  Referral was also made to establish care with a primary care provider to further discuss weight loss and referral to nutritionist.         Follow Up   No follow-ups on file.  Patient was given instructions and counseling regarding her condition or for health maintenance advice. Please see specific information pulled into the AVS if appropriate.

## 2022-06-04 LAB
ESTRADIOL SERPL-MCNC: 54.1 PG/ML
FSH SERPL-ACNC: 5.5 MIU/ML
LH SERPL-ACNC: 18.6 MIU/ML
TSH SERPL DL<=0.005 MIU/L-ACNC: 1.14 UIU/ML (ref 0.45–4.5)

## 2022-06-05 LAB — TESTOST FREE SERPL-MCNC: 2.1 PG/ML (ref 0–4.2)

## 2022-06-07 ENCOUNTER — OFFICE VISIT (OUTPATIENT)
Dept: BARIATRICS/WEIGHT MGMT | Facility: CLINIC | Age: 37
End: 2022-06-07

## 2022-06-07 ENCOUNTER — DOCUMENTATION (OUTPATIENT)
Dept: BARIATRICS/WEIGHT MGMT | Facility: CLINIC | Age: 37
End: 2022-06-07

## 2022-06-07 VITALS
TEMPERATURE: 97.8 F | HEIGHT: 65 IN | WEIGHT: 251.5 LBS | HEART RATE: 68 BPM | BODY MASS INDEX: 41.9 KG/M2 | SYSTOLIC BLOOD PRESSURE: 130 MMHG | RESPIRATION RATE: 18 BRPM | OXYGEN SATURATION: 98 % | DIASTOLIC BLOOD PRESSURE: 86 MMHG

## 2022-06-07 DIAGNOSIS — E66.01 OBESITY, CLASS III, BMI 40-49.9 (MORBID OBESITY): Primary | ICD-10-CM

## 2022-06-07 PROCEDURE — 99213 OFFICE O/P EST LOW 20 MIN: CPT | Performed by: PHYSICIAN ASSISTANT

## 2022-06-07 RX ORDER — OMEPRAZOLE 40 MG/1
40 CAPSULE, DELAYED RELEASE ORAL DAILY
Qty: 90 CAPSULE | Refills: 1 | Status: SHIPPED | OUTPATIENT
Start: 2022-06-07
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
## 2022-06-20 ENCOUNTER — OFFICE VISIT (OUTPATIENT)
Dept: FAMILY MEDICINE CLINIC | Facility: CLINIC | Age: 37
End: 2022-06-20

## 2022-06-20 VITALS
RESPIRATION RATE: 18 BRPM | OXYGEN SATURATION: 98 % | HEIGHT: 65 IN | DIASTOLIC BLOOD PRESSURE: 72 MMHG | TEMPERATURE: 98 F | BODY MASS INDEX: 42.15 KG/M2 | SYSTOLIC BLOOD PRESSURE: 124 MMHG | WEIGHT: 253 LBS

## 2022-06-20 DIAGNOSIS — Z00.00 ENCOUNTER FOR PREVENTIVE CARE: ICD-10-CM

## 2022-06-20 DIAGNOSIS — K21.9 GASTROESOPHAGEAL REFLUX DISEASE, UNSPECIFIED WHETHER ESOPHAGITIS PRESENT: ICD-10-CM

## 2022-06-20 DIAGNOSIS — Z00.00 ANNUAL PHYSICAL EXAM: Primary | ICD-10-CM

## 2022-06-20 DIAGNOSIS — E66.01 CLASS 3 SEVERE OBESITY DUE TO EXCESS CALORIES WITHOUT SERIOUS COMORBIDITY WITH BODY MASS INDEX (BMI) OF 40.0 TO 44.9 IN ADULT: ICD-10-CM

## 2022-06-20 DIAGNOSIS — F41.9 ANXIETY: ICD-10-CM

## 2022-06-20 DIAGNOSIS — T73.3XXA FATIGUE DUE TO EXCESSIVE EXERTION, INITIAL ENCOUNTER: ICD-10-CM

## 2022-06-20 PROCEDURE — 99385 PREV VISIT NEW AGE 18-39: CPT | Performed by: STUDENT IN AN ORGANIZED HEALTH CARE EDUCATION/TRAINING PROGRAM
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
## 2022-06-21 LAB
BASOPHILS # BLD AUTO: 0.1 X10E3/UL (ref 0–0.2)
BASOPHILS NFR BLD AUTO: 2 %
EOSINOPHIL # BLD AUTO: 0.4 X10E3/UL (ref 0–0.4)
EOSINOPHIL NFR BLD AUTO: 6 %
ERYTHROCYTE [DISTWIDTH] IN BLOOD BY AUTOMATED COUNT: 12.8 % (ref 11.7–15.4)
FERRITIN SERPL-MCNC: 15 NG/ML (ref 15–150)
HCT VFR BLD AUTO: 37.7 % (ref 34–46.6)
HGB BLD-MCNC: 12.4 G/DL (ref 11.1–15.9)
IMM GRANULOCYTES # BLD AUTO: 0 X10E3/UL (ref 0–0.1)
IMM GRANULOCYTES NFR BLD AUTO: 0 %
IRON SATN MFR SERPL: 18 % (ref 15–55)
IRON SERPL-MCNC: 52 UG/DL (ref 27–159)
LYMPHOCYTES # BLD AUTO: 3.1 X10E3/UL (ref 0.7–3.1)
LYMPHOCYTES NFR BLD AUTO: 48 %
MCH RBC QN AUTO: 30.3 PG (ref 26.6–33)
MCHC RBC AUTO-ENTMCNC: 32.9 G/DL (ref 31.5–35.7)
MCV RBC AUTO: 92 FL (ref 79–97)
MONOCYTES # BLD AUTO: 0.5 X10E3/UL (ref 0.1–0.9)
MONOCYTES NFR BLD AUTO: 7 %
NEUTROPHILS # BLD AUTO: 2.4 X10E3/UL (ref 1.4–7)
NEUTROPHILS NFR BLD AUTO: 37 %
PLATELET # BLD AUTO: 363 X10E3/UL (ref 150–450)
RBC # BLD AUTO: 4.09 X10E6/UL (ref 3.77–5.28)
TIBC SERPL-MCNC: 292 UG/DL (ref 250–450)
UIBC SERPL-MCNC: 240 UG/DL (ref 131–425)
VIT B12 SERPL-MCNC: 547 PG/ML (ref 232–1245)
WBC # BLD AUTO: 6.4 X10E3/UL (ref 3.4–10.8)

## 2022-07-01 DIAGNOSIS — E66.01 CLASS 3 SEVERE OBESITY DUE TO EXCESS CALORIES WITHOUT SERIOUS COMORBIDITY WITH BODY MASS INDEX (BMI) OF 40.0 TO 44.9 IN ADULT: ICD-10-CM

## 2022-07-01 DIAGNOSIS — T73.3XXA FATIGUE DUE TO EXCESSIVE EXERTION, INITIAL ENCOUNTER: Primary | ICD-10-CM

## 2022-09-19 ENCOUNTER — APPOINTMENT (OUTPATIENT)
Dept: SLEEP MEDICINE | Facility: HOSPITAL | Age: 37
End: 2022-09-19

## 2023-01-11 ENCOUNTER — OFFICE VISIT (OUTPATIENT)
Dept: OBSTETRICS AND GYNECOLOGY | Facility: CLINIC | Age: 38
End: 2023-01-11
Payer: COMMERCIAL

## 2023-01-11 VITALS
BODY MASS INDEX: 46.19 KG/M2 | HEART RATE: 81 BPM | WEIGHT: 277.2 LBS | HEIGHT: 65 IN | SYSTOLIC BLOOD PRESSURE: 127 MMHG | DIASTOLIC BLOOD PRESSURE: 74 MMHG

## 2023-01-11 DIAGNOSIS — B37.31 RECURRENT CANDIDIASIS OF VAGINA: ICD-10-CM

## 2023-01-11 DIAGNOSIS — L29.2 VULVAR ITCHING: ICD-10-CM

## 2023-01-11 DIAGNOSIS — Z01.419 WOMEN'S ANNUAL ROUTINE GYNECOLOGICAL EXAMINATION: Primary | ICD-10-CM

## 2023-01-11 DIAGNOSIS — Z01.89 PATIENT REQUESTED DIAGNOSTIC TESTING: ICD-10-CM

## 2023-01-11 DIAGNOSIS — Z11.3 SCREENING FOR STD (SEXUALLY TRANSMITTED DISEASE): ICD-10-CM

## 2023-01-11 PROCEDURE — 99395 PREV VISIT EST AGE 18-39: CPT | Performed by: NURSE PRACTITIONER

## 2023-01-11 PROCEDURE — 99213 OFFICE O/P EST LOW 20 MIN: CPT | Performed by: NURSE PRACTITIONER

## 2023-01-11 RX ORDER — LIDOCAINE 50 MG/G
1 PATCH TOPICAL EVERY 24 HOURS
COMMUNITY
Start: 2022-12-20 | End: 2023-01-19

## 2023-01-11 NOTE — PROGRESS NOTES
GYN Annual Exam     Chief Complaint   Patient presents with   • Annual Exam     Last AE & Pap 11/2021.    • Exposure to STD     Pt would like full panel STD testing       HPI    Deepika Gonzalez is a 37 y.o. female who presents for annual well woman exam.  She is not currently sexually active. Periods are regular every 28-30 days, lasting 4 days. Dysmenorrhea:none. Cyclic symptoms include none. No intermenstrual bleeding, spotting, or discharge. Performing SBE:completes. She has been diagnosed in the past with pseudotumor cerebri and has been advised to avoid estrogen-containing contraception. She is working full time in [place] and working on PhD. She is changing job roles, hoping to have less stress with new position. She would like STD testing today, denies current symptoms. Former partner was unfaithful.     C/o frequent yeast infections and itching near clitoris. Symptoms are not present today. She has had some weight gain. No recent A1C. She has been treating with OTC products or diflucan via Urgent Care.     Requesting urine drug screen today, had resent drug screen for new employer, however she felt uncertain about the manner in which this testing was collected and requests urine drug screen today for her own records.     She is a patient of Dr. Donovan.  This is my first time meeting Deepika Gonzalez    OB History    No obstetric history on file.         LMP- 1/4/23  Current contraception: condoms  Last Pap- 11/2021 LSIL, HPV negative  History of abnormal Pap smear: yes - 2021 LSIL  History of STD-denies  Family history of uterine, colon or ovarian cancer: no  Family history of breast cancer: no  Gardasil Vaccine: never    Past Medical History:   Diagnosis Date   • Anxiety    • Asthma    • Dental crown present     lower left    • Depression    • GERD (gastroesophageal reflux disease)     required PPI in remote past, prior to WLS.     • HTN (hypertension)    • Insulin resistance    • PCOS (polycystic ovarian  syndrome)    • Pseudotumor cerebri    • Seasonal allergies    • Wears glasses        Past Surgical History:   Procedure Laterality Date   • EAR TUBES  1995    x 3   • ENDOSCOPY  2005    Dr Hay in [facility],  showed reflux   • ENDOSCOPY N/A 03/27/2019    Procedure: ESOPHAGOGASTRODUODENOSCOPY;  Surgeon: Daquan Martinez MD;  Location:  [facility];  Service: Bariatric   • GASTRIC SLEEVE LAPAROSCOPIC N/A 03/27/2019    Procedure: GASTRIC SLEEVE LAPAROSCOPIC;  Surgeon: Daquan Martinez MD;  Location:  [facility];  Service: Bariatric         Current Outpatient Medications:   •  escitalopram (LEXAPRO) 20 MG tablet, Take 20 mg by mouth Daily., Disp: , Rfl:   •  lidocaine (LIDODERM) 5 %, Place 1 patch on the skin as directed by provider Daily., Disp: , Rfl:   •  Loratadine 10 MG capsule, Take 10 mg by mouth Daily., Disp: , Rfl:   •  multivitamin with minerals tablet tablet, Take 1 tablet by mouth Daily., Disp: , Rfl:   •  omeprazole (priLOSEC) 40 MG capsule, Take 1 capsule by mouth Daily., Disp: 90 capsule, Rfl: 1  •  vitamin D3 125 MCG (5000 UT) capsule capsule, Take 5,000 Units by mouth Daily., Disp: , Rfl:     No Known Allergies    Social History     Tobacco Use   • Smoking status: Never   • Smokeless tobacco: Never   Vaping Use   • Vaping Use: Never used   Substance Use Topics   • Alcohol use: Not Currently   • Drug use: Never       Family History   Problem Relation Age of Onset   • Obesity Mother    • Hypertension Mother    • Sleep apnea Mother    • Depression Mother    • Arthritis Mother    • Obesity Father    • Hypertension Father    • Arthritis Father    • Sleep apnea Father    • Depression Brother    • Heart disease Maternal Grandmother    • Hypertension Maternal Grandmother    • Hypertension Paternal Grandmother    • Cancer Paternal Grandmother    • Heart disease Paternal Grandmother    • Obesity Paternal Grandmother    • Sleep apnea Paternal Grandmother    • Cancer Paternal Grandfather        Review of Systems  "  Constitutional: Negative for chills, fatigue and fever.   Gastrointestinal: Negative for abdominal distention, abdominal pain, nausea and vomiting.   Genitourinary: Negative for breast discharge, breast lump, breast pain, dysuria, menstrual problem, pelvic pain, pelvic pressure, vaginal bleeding, vaginal discharge and vaginal pain.        + recurrent vaginal and vulvar itching, symptoms are not present today   Musculoskeletal: Negative for gait problem.   Skin: Negative for rash.   Neurological: Negative for dizziness and headache.   Psychiatric/Behavioral: Negative for behavioral problems.       /74   Pulse 81   Ht 165.1 cm (65\")   Wt 126 kg (277 lb 3.2 oz)   LMP 01/04/2023   BMI 46.13 kg/m²     Physical Exam  Constitutional:       General: She is not in acute distress.     Appearance: Normal appearance. She is obese. She is not ill-appearing, toxic-appearing or diaphoretic.   Genitourinary:      Vulva, bladder and urethral meatus normal.      No lesions in the vagina.      Right Labia: No rash, tenderness, lesions, skin changes or Bartholin's cyst.     Left Labia: No tenderness, lesions, skin changes, Bartholin's cyst or rash.     No labial fusion noted.      No inguinal adenopathy present in the right or left side.     No vaginal discharge, erythema, tenderness, bleeding or ulceration.      No vaginal prolapse present.     No vaginal atrophy present.       Right Adnexa: not tender, not full, not palpable, no mass present and not absent.     Left Adnexa: not tender, not full, not palpable, no mass present and not absent.     Cervical friability present.      No cervical motion tenderness, discharge, lesion, polyp, nabothian cyst or eversion.      Uterus is not enlarged, fixed, tender, irregular or prolapsed.      No uterine mass detected.     No urethral tenderness or mass present.      Pelvic exam was performed with patient in the lithotomy position.   Breasts:     Breasts are symmetrical.      " Right: Present. No swelling, bleeding, inverted nipple, mass, nipple discharge, skin change, tenderness or breast implant.      Left: Present. No swelling, bleeding, inverted nipple, mass, nipple discharge, skin change, tenderness or breast implant.   HENT:      Head: Normocephalic and atraumatic.   Eyes:      Pupils: Pupils are equal, round, and reactive to light.   Cardiovascular:      Rate and Rhythm: Normal rate.   Pulmonary:      Effort: Pulmonary effort is normal.   Abdominal:      General: There is no distension.      Palpations: Abdomen is soft. There is no mass.      Tenderness: There is no abdominal tenderness. There is no guarding.      Hernia: No hernia is present. There is no hernia in the left inguinal area or right inguinal area.   Musculoskeletal:         General: Normal range of motion.      Cervical back: Normal range of motion and neck supple. No tenderness.   Lymphadenopathy:      Cervical: No cervical adenopathy.      Upper Body:      Right upper body: No supraclavicular, axillary or pectoral adenopathy.      Left upper body: No supraclavicular, axillary or pectoral adenopathy.      Lower Body: No right inguinal adenopathy. No left inguinal adenopathy.   Neurological:      General: No focal deficit present.      Mental Status: She is alert and oriented to person, place, and time.      Cranial Nerves: No cranial nerve deficit.   Skin:     General: Skin is warm and dry.   Psychiatric:         Mood and Affect: Mood normal.         Behavior: Behavior normal.         Thought Content: Thought content normal.         Judgment: Judgment normal.   Vitals and nursing note reviewed.         Assessment   Diagnoses and all orders for this visit:    1. Women's annual routine gynecological examination (Primary)  -     IGP, Apt HPV,rfx 16 / 18,45    2. Screening for STD (sexually transmitted disease)  -     Chlamydia trachomatis, Neisseria gonorrhoeae, Trichomonas vaginalis, PCR - Swab, Cervix  -     HIV-1 / O  / 2 Ag / Antibody 4th Generation  -     RPR  -     Hepatitis B Surface Antigen  -     Hepatitis C Antibody    3. Recurrent candidiasis of vagina  -     Hemoglobin A1c    4. Patient requested diagnostic testing  -     Urine Drug Screen - Urine, Clean Catch; Future    5. Vulvar itching         Plan   1. Well woman exam: Pap collected Yes. Recommend MVI daily.    2. Contraception: declines  3. STD: Enc condoms. Desires STD screen today- Yes. Serum and NuSwab  4. Smoking status: nonsmoker  5.  Encouraged annual mammogram screening starting at age 40. Instructed on how to perform SBE. Encouraged breast health self awareness.  6.    Encouraged 150 minutes of exercise per week if not medially contraindicated.   7.    Morbid obesity by BMI 46.13  8.   Gardasil vaccine: We discussed that this is a vaccine to protect against the human papilloma virus. HPV can cause cervical cancer, as well as genital warts. The vaccine reduces the chance of cervical cancer by up to 70 percent. It also can protect against cancers of the vulva, vagina, anus and possibly laryngeal cancers. The vaccine is recommended for girls and boys the recommended age is 11-12, with catch up vaccination for anyone over that age until the age of 27. There are 3 vaccines in the series.   9.   Discussed recurrent yeast causes. No abnormal discharge or vulvar skin changes noted. Will check A1C today. Encouraged mild or no soaps, cotton only underwear, avoid douching and brian steaming  10. Discussed urine drug screen, no medical indication to collect, likely insurance will not cover and she will have to pay out of pocket. She verbalizes understanding and accepts responsibility for the cost associated with urine drug screen. She states she would like to have for her records    Follow Up one year or PRN    Corinne Noonan, APRN  1/11/2023  11:25 EST

## 2023-01-12 LAB
AMPHETAMINES UR QL SCN: NEGATIVE NG/ML
BARBITURATES UR QL SCN: NEGATIVE NG/ML
BENZODIAZ UR QL SCN: NEGATIVE NG/ML
BZE UR QL SCN: NEGATIVE NG/ML
CANNABINOIDS UR QL SCN: NEGATIVE NG/ML
CREAT UR-MCNC: 140 MG/DL (ref 20–300)
HBA1C MFR BLD: 4.3 % (ref 4.8–5.6)
HBV SURFACE AG SERPL QL IA: NEGATIVE
HCV AB S/CO SERPL IA: <0.1 S/CO RATIO (ref 0–0.9)
HIV 1+2 AB+HIV1 P24 AG SERPL QL IA: NON REACTIVE
LABORATORY COMMENT REPORT: NORMAL
METHADONE UR QL SCN: NEGATIVE NG/ML
OPIATES UR QL SCN: NEGATIVE NG/ML
OXYCODONE+OXYMORPHONE UR QL SCN: NEGATIVE NG/ML
PCP UR QL: NEGATIVE NG/ML
PH UR: 7.4 [PH] (ref 4.5–8.9)
PROPOXYPH UR QL SCN: NEGATIVE NG/ML
RPR SER QL: NORMAL

## 2023-01-13 LAB
C TRACH RRNA SPEC QL NAA+PROBE: NEGATIVE
N GONORRHOEA RRNA SPEC QL NAA+PROBE: NEGATIVE
T VAGINALIS RRNA SPEC QL NAA+PROBE: NEGATIVE

## 2023-01-16 LAB
CYTOLOGIST CVX/VAG CYTO: NORMAL
CYTOLOGY CVX/VAG DOC CYTO: NORMAL
CYTOLOGY CVX/VAG DOC THIN PREP: NORMAL
DX ICD CODE: NORMAL
HIV 1 & 2 AB SER-IMP: NORMAL
HPV I/H RISK 4 DNA CVX QL PROBE+SIG AMP: NEGATIVE
Lab: NORMAL
OTHER STN SPEC: NORMAL
STAT OF ADQ CVX/VAG CYTO-IMP: NORMAL

## 2023-06-19 ENCOUNTER — TELEPHONE (OUTPATIENT)
Dept: FAMILY MEDICINE CLINIC | Facility: CLINIC | Age: 38
End: 2023-06-19

## 2023-06-19 DIAGNOSIS — E88.81 INSULIN RESISTANCE: ICD-10-CM

## 2023-06-19 DIAGNOSIS — E66.01 CLASS 3 SEVERE OBESITY DUE TO EXCESS CALORIES WITHOUT SERIOUS COMORBIDITY WITH BODY MASS INDEX (BMI) OF 40.0 TO 44.9 IN ADULT: ICD-10-CM

## 2023-06-19 DIAGNOSIS — I10 HYPERTENSION, UNSPECIFIED TYPE: Primary | ICD-10-CM

## 2023-06-19 NOTE — TELEPHONE ENCOUNTER
Caller: Deepika Gonzalez    Relationship to patient: Self    Best call back number:     [phone] (Mobile)       Patient is needing: PATIENT CANCELLED HER LAB APPOINTMENT, SHE WANTED TO KNOW WHY SHE NEEDED LABS. PLEASE ADVISE.

## 2023-11-06 ENCOUNTER — OFFICE VISIT (OUTPATIENT)
Dept: BARIATRICS/WEIGHT MGMT | Facility: CLINIC | Age: 38
End: 2023-11-06
Payer: COMMERCIAL

## 2023-11-06 VITALS
HEIGHT: 65 IN | WEIGHT: 292 LBS | BODY MASS INDEX: 48.65 KG/M2 | SYSTOLIC BLOOD PRESSURE: 134 MMHG | HEART RATE: 79 BPM | RESPIRATION RATE: 18 BRPM | TEMPERATURE: 97.8 F | OXYGEN SATURATION: 98 % | DIASTOLIC BLOOD PRESSURE: 82 MMHG

## 2023-11-06 DIAGNOSIS — R53.83 FATIGUE, UNSPECIFIED TYPE: ICD-10-CM

## 2023-11-06 DIAGNOSIS — E55.9 VITAMIN D DEFICIENCY: ICD-10-CM

## 2023-11-06 DIAGNOSIS — R12 HEARTBURN: ICD-10-CM

## 2023-11-06 DIAGNOSIS — E66.01 OBESITY, CLASS III, BMI 40-49.9 (MORBID OBESITY): Primary | ICD-10-CM

## 2023-11-06 PROCEDURE — 99214 OFFICE O/P EST MOD 30 MIN: CPT | Performed by: PHYSICIAN ASSISTANT

## 2023-11-06 RX ORDER — OMEPRAZOLE 40 MG/1
40 CAPSULE, DELAYED RELEASE ORAL DAILY
Qty: 90 CAPSULE | Refills: 1 | Status: SHIPPED | OUTPATIENT
Start: 2023-11-06

## 2023-11-06 NOTE — PROGRESS NOTES
[facility]  [address]    [facility] [id number] [phone]        Patient Name:  Deepika Gonzalez.  :  1985      Date of Visit:  2023        Reason for Visit:   Annual Eval - 4+ years postop       HPI: Deepika Gonzalez is a 38 y.o. female s/p LSG 3/27/19 GDW       LOV 2022.   Wanting to get back on track.      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  Admits some days she will go all day w/out eating b/c she is so busy.  Often feels starved in the evenings.      Getting 30-40g prot/day.  Feels she needs to work more closely w/ dietitian.      Has 2 gym memberships, but struggles to find time to go more than 2x/week.      Taking vitamins (MVI + Vit D), mostly compliant.  No recent labs done.    Has fatigue, but not sure if it is r/t vitamin deficiency or stress.      Ran out of Omeprazole RX, needing refilled.  Has ongoing issues w/ reflux, but improved when taking the medication.  Does not wish to further evaluate at this time.       Presurgery weight: 310 pounds.  Lowest weight:  208 lbs.  Today's weight is 132 kg (292 lb) pounds, today's  Body mass index is 48.59 kg/m²., and her weight loss since surgery is 18 pounds.  Personal goal: 160-170 lbs.     Past Medical History:   Diagnosis Date    Anxiety     Asthma     Dental crown present     lower left     Depression     GERD (gastroesophageal reflux disease)     required PPI in remote past, prior to WLS.      HTN (hypertension)     Insulin resistance     PCOS (polycystic ovarian syndrome)     Pseudotumor cerebri     Seasonal allergies     Wears glasses      Past Surgical History:   Procedure Laterality Date    EAR TUBES  1995    x 3    ENDOSCOPY      Dr Hay in [place],  showed reflux    ENDOSCOPY N/A 2019    Procedure: ESOPHAGOGASTRODUODENOSCOPY;  Surgeon: Daquan Martinez MD;  Location: [facility];  Service: Bariatric    GASTRIC SLEEVE LAPAROSCOPIC N/A 2019     "Procedure: GASTRIC SLEEVE LAPAROSCOPIC;  Surgeon: Daquan Martinez MD;  Location: [facility];  Service: Bariatric     Outpatient Medications Marked as Taking for the 11/6/23 encounter (Office Visit) with Marium Rojas PA   Medication Sig Dispense Refill    Loratadine 10 MG capsule Take 1 capsule by mouth Daily.      multivitamin with minerals tablet tablet Take 1 tablet by mouth Daily.      omeprazole (priLOSEC) 40 MG capsule Take 1 capsule by mouth Daily. 90 capsule 1    vitamin D3 125 MCG (5000 UT) capsule capsule Take 1 capsule by mouth Daily.         No Known Allergies    Social History     Socioeconomic History    Marital status: Single    Years of education: Masters Degree    Tobacco Use    Smoking status: Never    Smokeless tobacco: Never   Vaping Use    Vaping Use: Never used   Substance and Sexual Activity    Alcohol use: Not Currently    Drug use: Never    Sexual activity: Not Currently       /82 (BP Location: Right arm, Patient Position: Sitting)   Pulse 79   Temp 97.8 °F (36.6 °C)   Resp 18   Ht 165.1 cm (65\")   Wt 132 kg (292 lb)   SpO2 98%   BMI 48.59 kg/m²     Physical Exam   Constitutional: She appears well-developed.   Cardiovascular: Normal rate.   Pulmonary/Chest: Effort normal.   Musculoskeletal: Normal range of motion.   Neurological: She is alert.   Psychiatric: Judgment and thought content normal.         Assessment:  4+ years s/p LSG 3/27/19 w/ GDW       ICD-10-CM ICD-9-CM   1. Obesity, Class III, BMI 40-49.9 (morbid obesity)  E66.01 278.01   2. Heartburn  R12 787.1   3. Fatigue, unspecified type  R53.83 780.79   4. Vitamin D deficiency  E55.9 268.9         Plan:  Discussed necessary dietary/lifestyle modifications.  Advised 100g prot/day.  Encouraged packing/meal planning.  Increase daily water intake.  Increase routine exercise/activity as able.  Referral to M still active, but patient not sure if will be able to establish care given out-of-network insurance issues.  " Follow up w/ dietitian encouraged.  Get labs as ordered by PCP.  Omeprazole 40mg RX escribed.  Call w/ issues/concerns.          The patient was advised to follow up in 3-6 months, sooner if needed.

## 2024-07-08 NOTE — PROGRESS NOTES
Sam Barrera M.D.  Internal Medicine  [facility]  [address]  [place] [address]  [phone]      Chief Complaint  Establish Care, health & nutrition  (Wants to discuss health & nutrition /), and sleep study  (Would like a referral for a sleep study )    SUBJECTIVE    History of Present Illness    Deepika Gonzalez is a 38 y.o. female who presents to the office today as a new patient to establish care.     Obesity-previously had bariatric surgery-sleeve gastrectomy. Previously 80 pounds lighter. Wanting to work on nutrition. Previously saw a dietician. Previously had some success with structured weight loss program.     Not sleeping well.     GERD-on Prilosec    Follows with GYN    Anxiety/depression-previously on on Lexapro for 3 months. Could not tell it worked. Previously doing therapy but it was expensive. States her stress level is high. Does not eat as well when she is busy. Denies depression currently. She is worrying excessively. Has some worry that something terrible will happen. No restlessness. Some irritability at work. Working from home helped with stress.     Requesting a referral for a sleep study. She snores. Wakes up 20-30 times nightly. Wakes herself up snoring. [place] tired after napping. Does not nap often. Feels tired during the day. Not falling asleep watching TV. Not sure if she has apneic spells. She has a history of HTN but not currently on medications. Works late. No panic attacks. Somedays has trouble relaxing. Worries about family, work and school. Does not interfere with life both. Drinks 1 espresso shot/day in the morning.     Studied until 11 pm last night and woke up at 6am.     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 Getting Phd in education    Asthma as a child and as young adult but has not used in 15 years.     Has a GYN-Emmy maldonado. PCOS    Review of Systems    No Known Allergies     Outpatient Medications Marked as  Taking for the 7/9/24 encounter (Office Visit) with Sam Barrera MD   Medication Sig Dispense Refill    multivitamin with minerals tablet tablet Take 1 tablet by mouth Daily.      omeprazole (priLOSEC) 40 MG capsule Take 1 capsule by mouth Daily. 90 capsule 1    vitamin D3 125 MCG (5000 UT) capsule capsule Take 1 capsule by mouth Daily.          Past Medical History:   Diagnosis Date    Anxiety     Asthma     Dental crown present     lower left     Depression     GERD (gastroesophageal reflux disease)     required PPI in remote past, prior to WLS.      HTN (hypertension)     Insulin resistance     Obesity 2024    PCOS (polycystic ovarian syndrome)     Pseudotumor cerebri     Seasonal allergies     Wears glasses      Past Surgical History:   Procedure Laterality Date    EAR TUBES  1995    x 3    ENDOSCOPY  2005    Dr Hay in [place],  showed reflux    ENDOSCOPY N/A 03/27/2019    Procedure: ESOPHAGOGASTRODUODENOSCOPY;  Surgeon: Daquan Martinez MD;  Location:  [facility];  Service: Bariatric    GASTRIC SLEEVE LAPAROSCOPIC N/A 03/27/2019    Procedure: GASTRIC SLEEVE LAPAROSCOPIC;  Surgeon: Daquan Martinez MD;  Location:  [facility];  Service: Bariatric     Family History   Problem Relation Age of Onset    Obesity Mother     Hypertension Mother     Sleep apnea Mother     Depression Mother     Arthritis Mother     Obesity Father     Hypertension Father     Arthritis Father     Sleep apnea Father     Alcohol abuse Father     Drug abuse Father     Heart disease Father     Depression Brother     Alcohol abuse Brother     Asthma Brother     Depression Brother     Drug abuse Brother     Heart disease Maternal Grandmother     Hypertension Maternal Grandmother     Hypertension Paternal Grandmother     Cancer Paternal Grandmother     Heart disease Paternal Grandmother     Obesity Paternal Grandmother     Sleep apnea Paternal Grandmother     Diabetes Paternal Grandmother     Lupus Paternal Grandmother     Cancer Paternal  "Grandfather     Alcohol abuse Paternal Grandfather     Hypertension Paternal Grandfather     reports that she has never smoked. She has never used smokeless tobacco. She reports that she does not currently use alcohol. She reports that she does not use drugs.    OBJECTIVE    Vital Signs:   /70   Pulse 104   Ht 165.1 cm (65\")   Wt (!) 136 kg (300 lb 6.4 oz)   SpO2 100%   BMI 49.99 kg/m²     Physical Exam  Constitutional:       Appearance: Normal appearance.   HENT:      Right Ear: Tympanic membrane normal.      Left Ear: Tympanic membrane normal.   Cardiovascular:      Rate and Rhythm: Normal rate and regular rhythm.      Heart sounds: Normal heart sounds. No murmur heard.  Pulmonary:      Effort: Pulmonary effort is normal.      Breath sounds: Normal breath sounds.   Abdominal:      General: Abdomen is flat. There is no distension.      Palpations: Abdomen is soft.      Tenderness: There is no abdominal tenderness.   Musculoskeletal:      Right lower leg: No edema.      Left lower leg: No edema.   Skin:     General: Skin is warm and dry.   Neurological:      Mental Status: She is alert.   Psychiatric:         Mood and Affect: Mood normal.         Behavior: Behavior normal.         Thought Content: Thought content normal.            The following data was reviewed by: Sam Barrera MD on 07/09/2024:            Data reviewed : Previous bariatrics notes              ASSESSMENT & PLAN     Diagnoses and all orders for this visit:    1. Fatigue, unspecified type (Primary)  -     Ambulatory Referral to Sleep Medicine    2. Anxiety    Insomnia  Recommend cognitive behavioral therapy for insomnia and anxiety.  Discussed sleep hygiene measures including regular sleep schedule, optimal sleep environment, and relaxing presleep rituals.  Avoid daytime naps.  Avoid caffeine after noon.  Avoid excess alcohol.  Avoid tobacco.  Recommended daily exercise.  Manage stress    3. PCOS (polycystic ovarian syndrome)  -     " Ambulatory Referral to Nutrition Services  -     Semaglutide-Weight Management (Wegovy) 0.5 MG/0.5ML solution auto-injector; Inject 0.5 mL under the skin into the appropriate area as directed 1 (One) Time Per Week.  Dispense: 2 mL; Refill: 0    4. Class 3 severe obesity due to excess calories with serious comorbidity and body mass index (BMI) of 45.0 to 49.9 in adult      -Patient is actively trying to lose weight . Patient is not currently exercising but does normally. Patient is not counting calories or following a specific diet plan.  -Recommended to patient: exercise a minimum of 150 minutes per week at a brisk walk or more; nutritionist consultation; calorie counting (many successful apps vs tracking on paper); structured diet programs such as Weight Watchers or SplashCast for those who prefer; behavioral therapy such as [facility]mery or in person cognitive behavioral therapy; avoid OTC dietary supplements as there is limited evidence and safety data; consider OTC All? (orlistat) 60 mg daily, discussed contraindications, common side effects and usage instructions.         We discussed the following classes of obesity medications.      Saxenda, Wegovy, Zepbound.  Active ingredient is also in diabetes medication.  It is a daily injection.  It works by activating the GLP-1 receptor in the brain, regulating appetite and caloric intake.  Contraindicated if you have family history of thyroid cancer.  Common reaction is nausea.     Patient is interested in trying medication for weight loss.  Prescription sent.      Health Maintenance Due   Topic Date Due    TDAP/TD VACCINES (2 - Tdap) 06/27/2010    ANNUAL PHYSICAL  06/20/2023    COVID-19 Vaccine (4 - 2023-24 season) 09/01/2023        Follow Up  Return in about 3 months (around 10/9/2024) for Annual physical.    Patient/family had no further questions at this time and verbalized understanding of the plan discussed today.

## 2024-07-09 ENCOUNTER — OFFICE VISIT (OUTPATIENT)
Dept: INTERNAL MEDICINE | Facility: CLINIC | Age: 39
End: 2024-07-09
Payer: COMMERCIAL

## 2024-07-09 VITALS
BODY MASS INDEX: 48.82 KG/M2 | OXYGEN SATURATION: 100 % | DIASTOLIC BLOOD PRESSURE: 70 MMHG | HEART RATE: 104 BPM | WEIGHT: 293 LBS | SYSTOLIC BLOOD PRESSURE: 124 MMHG | HEIGHT: 65 IN

## 2024-07-09 DIAGNOSIS — E66.01 CLASS 3 SEVERE OBESITY DUE TO EXCESS CALORIES WITH SERIOUS COMORBIDITY AND BODY MASS INDEX (BMI) OF 45.0 TO 49.9 IN ADULT: ICD-10-CM

## 2024-07-09 DIAGNOSIS — E28.2 PCOS (POLYCYSTIC OVARIAN SYNDROME): ICD-10-CM

## 2024-07-09 DIAGNOSIS — F41.9 ANXIETY: ICD-10-CM

## 2024-07-09 DIAGNOSIS — R53.83 FATIGUE, UNSPECIFIED TYPE: Primary | ICD-10-CM

## 2024-07-09 DIAGNOSIS — Z00.00 ANNUAL PHYSICAL EXAM: ICD-10-CM

## 2024-07-09 PROCEDURE — 99214 OFFICE O/P EST MOD 30 MIN: CPT | Performed by: STUDENT IN AN ORGANIZED HEALTH CARE EDUCATION/TRAINING PROGRAM

## 2024-07-09 RX ORDER — SEMAGLUTIDE 0.5 MG/.5ML
0.5 INJECTION, SOLUTION SUBCUTANEOUS WEEKLY
Qty: 2 ML | Refills: 0 | Status: SHIPPED | OUTPATIENT
Start: 2024-07-09

## 2024-07-23 ENCOUNTER — HOSPITAL ENCOUNTER (OUTPATIENT)
Dept: DIABETES SERVICES | Facility: HOSPITAL | Age: 39
Discharge: HOME | End: 2024-07-23
Admitting: STUDENT IN AN ORGANIZED HEALTH CARE EDUCATION/TRAINING PROGRAM
Payer: COMMERCIAL

## 2024-07-23 PROCEDURE — 97802 MEDICAL NUTRITION INDIV IN: CPT

## 2024-07-23 NOTE — CONSULTS
Deepika met with ALVARO VILLANUEVA for PCOS.  A comprehensive assessment/training record has been sent to medical records (see media tab) to associate with this encounter.     Reports getting the sleeve done in [place] about 5 years ago by Dr. Martinez. Is finishing up PhD and works. Likes to jog, does 5k. Will frequently get caught up with work and go without eating food. Encouraged limiting carbohydrates from beverages. Consistent carbohydrate diet/carbohydrate counting discussed. Encouraged balancing carbs with lean proteins/limiting saturated fats.     Deepika has been encouraged to call our office with questions or additional education needs. Please place referral for additional services or follow-up should need arise.     Thank you for the referral.

## 2024-08-12 NOTE — TELEPHONE ENCOUNTER
Rx Refill Note  Requested Prescriptions     Pending Prescriptions Disp Refills    omeprazole (priLOSEC) 40 MG capsule 90 capsule 1     Sig: Take 1 capsule by mouth Daily.      Last office visit with prescribing clinician: 11/6/2023   Last telemedicine visit with prescribing clinician: Visit date not found   Next office visit with prescribing clinician: Visit date not found                         Would you like a call back once the refill request has been completed: [] Yes [x] No    If the office needs to give you a call back, can they leave a voicemail: [] Yes [x] No    Radha Vang MA  08/12/24, 13:47 EDT

## 2024-08-20 RX ORDER — OMEPRAZOLE 40 MG/1
40 CAPSULE, DELAYED RELEASE ORAL DAILY
Qty: 90 CAPSULE | Refills: 1 | OUTPATIENT
Start: 2024-08-20

## 2024-08-23 ENCOUNTER — APPOINTMENT (OUTPATIENT)
Dept: CT IMAGING | Facility: HOSPITAL | Age: 39
End: 2024-08-23
Payer: COMMERCIAL

## 2024-08-23 ENCOUNTER — HOSPITAL ENCOUNTER (EMERGENCY)
Facility: HOSPITAL | Age: 39
Discharge: HOME | End: 2024-08-23
Attending: EMERGENCY MEDICINE
Payer: COMMERCIAL

## 2024-08-23 VITALS
BODY MASS INDEX: 48.82 KG/M2 | WEIGHT: 293 LBS | TEMPERATURE: 99 F | OXYGEN SATURATION: 99 % | HEART RATE: 103 BPM | HEIGHT: 65 IN | RESPIRATION RATE: 20 BRPM

## 2024-08-23 DIAGNOSIS — S16.1XXA STRAIN OF NECK MUSCLE, INITIAL ENCOUNTER: ICD-10-CM

## 2024-08-23 DIAGNOSIS — R51.9 ACUTE NONINTRACTABLE HEADACHE, UNSPECIFIED HEADACHE TYPE: Primary | ICD-10-CM

## 2024-08-23 PROCEDURE — 72125 CT NECK SPINE W/O DYE: CPT

## 2024-08-23 PROCEDURE — 99284 EMERGENCY DEPT VISIT MOD MDM: CPT

## 2024-08-23 PROCEDURE — 70450 CT HEAD/BRAIN W/O DYE: CPT
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
## 2024-08-23 NOTE — DISCHARGE INSTRUCTIONS
Follow-up with primary care provider.  Use Tylenol and tizanidine muscle relaxer to help with pain.  Use heating pad to help with sore muscles.  Return to emergency department for any worsening symptoms.
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
## 2024-08-27 ENCOUNTER — OFFICE VISIT (OUTPATIENT)
Dept: INTERNAL MEDICINE | Facility: CLINIC | Age: 39
End: 2024-08-27
Payer: COMMERCIAL

## 2024-08-27 VITALS
SYSTOLIC BLOOD PRESSURE: 138 MMHG | DIASTOLIC BLOOD PRESSURE: 82 MMHG | WEIGHT: 293 LBS | BODY MASS INDEX: 48.82 KG/M2 | OXYGEN SATURATION: 95 % | HEART RATE: 73 BPM | HEIGHT: 65 IN

## 2024-08-27 DIAGNOSIS — S06.0X0S CONCUSSION WITHOUT LOSS OF CONSCIOUSNESS, SEQUELA: ICD-10-CM

## 2024-08-27 DIAGNOSIS — S13.4XXD WHIPLASH INJURY TO NECK, SUBSEQUENT ENCOUNTER: Primary | ICD-10-CM

## 2024-08-27 DIAGNOSIS — M54.6 ACUTE MIDLINE THORACIC BACK PAIN: ICD-10-CM

## 2024-08-27 DIAGNOSIS — E23.6 EMPTY SELLA: ICD-10-CM

## 2024-08-27 DIAGNOSIS — R59.9 LYMPH NODE ENLARGEMENT: ICD-10-CM

## 2024-08-27 PROCEDURE — 99214 OFFICE O/P EST MOD 30 MIN: CPT | Performed by: STUDENT IN AN ORGANIZED HEALTH CARE EDUCATION/TRAINING PROGRAM
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
## 2024-08-27 NOTE — LETTER
August 27, 2024     Patient: Deepika Gonzalez   YOB: 1985   Date of Visit: 8/27/2024       To Whom It May Concern:    It is my medical opinion that Deepika Gonzalez may return to work on Wednesday, September 4th.            Sincerely,        Sam Barrera MD    CC: No Recipients

## 2024-08-27 NOTE — LETTER
August 27, 2024     Patient: Deepika Gonzalez   YOB: 1985   Date of Visit: 8/27/2024       To Whom It May Concern:    It is my medical opinion that Deepika Gonzalez may return to work Wednesday September 4, 2024. She should not lift more than 10 lb for next 2 weeks.             Sincerely,        Sam Barrera MD    CC: No Recipients

## 2024-08-28 NOTE — ADDENDUM NOTE
Addended by: SIDDHARTHA MURRIETA on: 8/28/2024 05:40 PM     Modules accepted: Orders, Level of Service

## 2024-08-30 LAB
BASOPHILS # BLD AUTO: 0.06 10*3/MM3 (ref 0–0.2)
BASOPHILS NFR BLD AUTO: 0.8 % (ref 0–1.5)
EOSINOPHIL # BLD AUTO: 0.22 10*3/MM3 (ref 0–0.4)
EOSINOPHIL NFR BLD AUTO: 2.8 % (ref 0.3–6.2)
ERYTHROCYTE [DISTWIDTH] IN BLOOD BY AUTOMATED COUNT: 15.5 % (ref 12.3–15.4)
HCT VFR BLD AUTO: 36 % (ref 34–46.6)
HGB BLD-MCNC: 11.3 G/DL (ref 12–15.9)
IMM GRANULOCYTES # BLD AUTO: 0.02 10*3/MM3 (ref 0–0.05)
IMM GRANULOCYTES NFR BLD AUTO: 0.3 % (ref 0–0.5)
LYMPHOCYTES # BLD AUTO: 2.78 10*3/MM3 (ref 0.7–3.1)
LYMPHOCYTES NFR BLD AUTO: 34.8 % (ref 19.6–45.3)
MCH RBC QN AUTO: 25.9 PG (ref 26.6–33)
MCHC RBC AUTO-ENTMCNC: 31.4 G/DL (ref 31.5–35.7)
MCV RBC AUTO: 82.6 FL (ref 79–97)
MONOCYTES # BLD AUTO: 0.54 10*3/MM3 (ref 0.1–0.9)
MONOCYTES NFR BLD AUTO: 6.8 % (ref 5–12)
NEUTROPHILS # BLD AUTO: 4.36 10*3/MM3 (ref 1.7–7)
NEUTROPHILS NFR BLD AUTO: 54.5 % (ref 42.7–76)
NRBC BLD AUTO-RTO: 0 /100 WBC (ref 0–0.2)
PLATELET # BLD AUTO: 467 10*3/MM3 (ref 140–450)
RBC # BLD AUTO: 4.36 10*6/MM3 (ref 3.77–5.28)
WBC # BLD AUTO: 7.98 10*3/MM3 (ref 3.4–10.8)

## 2024-09-01 LAB
CORTIS AM PEAK SERPL-MCNC: 14.1 UG/DL (ref 6.2–19.4)
FSH SERPL-ACNC: 4.6 MIU/ML
IGF-I SERPL-MCNC: 140 NG/ML (ref 79–259)
LH SERPL-ACNC: 13.4 MIU/ML
PROLACTIN SERPL-MCNC: 21.7 NG/ML (ref 4.8–33.4)
T4 SERPL-MCNC: 7.95 MCG/DL (ref 4.5–11.7)
TSH SERPL DL<=0.005 MIU/L-ACNC: 2.33 UIU/ML (ref 0.27–4.2)

## 2024-09-03 DIAGNOSIS — D64.9 ANEMIA, UNSPECIFIED TYPE: Primary | ICD-10-CM

## 2024-09-04 LAB
FERRITIN SERPL-MCNC: 13.8 NG/ML (ref 13–150)
FOLATE SERPL-MCNC: 7.67 NG/ML (ref 4.78–24.2)
IRON SATN MFR SERPL: 9 % (ref 20–50)
IRON SERPL-MCNC: 41 MCG/DL (ref 37–145)
TIBC SERPL-MCNC: 451 MCG/DL
UIBC SERPL-MCNC: 410 MCG/DL (ref 112–346)
VIT B12 SERPL-MCNC: 876 PG/ML (ref 211–946)
WRITTEN AUTHORIZATION: NORMAL

## 2024-09-12 ENCOUNTER — TREATMENT (OUTPATIENT)
Dept: PHYSICAL THERAPY | Facility: CLINIC | Age: 39
End: 2024-09-12
Payer: COMMERCIAL

## 2024-09-12 ENCOUNTER — PATIENT MESSAGE (OUTPATIENT)
Dept: INTERNAL MEDICINE | Facility: CLINIC | Age: 39
End: 2024-09-12
Payer: COMMERCIAL

## 2024-09-12 DIAGNOSIS — S13.4XXA WHIPLASH INJURY TO NECK, INITIAL ENCOUNTER: ICD-10-CM

## 2024-09-12 DIAGNOSIS — M54.6 ACUTE MIDLINE THORACIC BACK PAIN: Primary | ICD-10-CM

## 2024-09-12 DIAGNOSIS — M54.42 ACUTE LEFT-SIDED LOW BACK PAIN WITH LEFT-SIDED SCIATICA: Primary | ICD-10-CM

## 2024-09-12 DIAGNOSIS — S16.1XXA STRAIN OF NECK MUSCLE, INITIAL ENCOUNTER: ICD-10-CM

## 2024-09-12 DIAGNOSIS — M54.6 ACUTE MIDLINE THORACIC BACK PAIN: ICD-10-CM

## 2024-09-12 NOTE — TELEPHONE ENCOUNTER
From: Deepika Gonzalez  To: Sam Barrera  Sent: 9/12/2024 9:20 AM EDT  Subject: Lumbar MRI    Formerly [facility] Dr. Barrera,    My physical therapist recommends I have a lumbar MRI as well because of the pain I am feeling and the assessment she performed on me. She mentioned that she is placing a staff note regarding a lumbar MRI as well. Can you place the referral for me to get the lumbar MRI?
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
## 2024-09-12 NOTE — PATIENT INSTRUCTIONS
Access Code: [email or web address]  URL: [email or web address]  Date: 09/12/2024  Prepared by: Freida Oconnor    Exercises  - Seated Cervical Sidebending Stretch  - 1 x daily - 7 x weekly - 1 sets - 5 reps - 10 hold  - Seated Levator Scapulae Stretch  - 1 x daily - 7 x weekly - 1 sets - 5 reps - 10 hold  - Supine Posterior Pelvic Tilt  - 1 x daily - 7 x weekly - 1 sets - 10 reps - 5 hold  - Supine Piriformis Stretch with Foot on Ground  - 1 x daily - 7 x weekly - 1 sets - 5 reps - 10 hold  - Supine Lower Trunk Rotation  - 1 x daily - 7 x weekly - 1 sets - 10 reps - 5 hold  Pt was educated on findings of evaluation, purpose of treatment and goals for therapy. Treatment options discussed and questions answered. Pt was educated on exercises, self treatment and pain relief techniques. Pt educated on anatomy of affected structures.

## 2024-09-14 ENCOUNTER — HOSPITAL ENCOUNTER (OUTPATIENT)
Dept: MRI IMAGING | Facility: HOSPITAL | Age: 39
Discharge: HOME | End: 2024-09-14
Payer: COMMERCIAL

## 2024-09-14 DIAGNOSIS — M54.6 ACUTE MIDLINE THORACIC BACK PAIN: ICD-10-CM

## 2024-09-14 DIAGNOSIS — S13.4XXD WHIPLASH INJURY TO NECK, SUBSEQUENT ENCOUNTER: ICD-10-CM

## 2024-09-14 PROCEDURE — 72146 MRI CHEST SPINE W/O DYE: CPT

## 2024-09-14 PROCEDURE — 72141 MRI NECK SPINE W/O DYE: CPT

## 2024-09-16 RX ORDER — OMEPRAZOLE 40 MG/1
40 CAPSULE, DELAYED RELEASE ORAL DAILY
Qty: 90 CAPSULE | Refills: 1 | OUTPATIENT
Start: 2024-09-16

## 2024-09-17 ENCOUNTER — TELEPHONE (OUTPATIENT)
Dept: INTERNAL MEDICINE | Facility: CLINIC | Age: 39
End: 2024-09-17
Payer: COMMERCIAL

## 2024-09-18 ENCOUNTER — TELEPHONE (OUTPATIENT)
Dept: INTERNAL MEDICINE | Facility: CLINIC | Age: 39
End: 2024-09-18
Payer: COMMERCIAL

## 2024-09-18 DIAGNOSIS — E28.2 PCOS (POLYCYSTIC OVARIAN SYNDROME): ICD-10-CM

## 2024-09-18 DIAGNOSIS — E66.01 CLASS 3 SEVERE OBESITY DUE TO EXCESS CALORIES WITH SERIOUS COMORBIDITY AND BODY MASS INDEX (BMI) OF 45.0 TO 49.9 IN ADULT: Primary | ICD-10-CM

## 2024-09-18 RX ORDER — OMEPRAZOLE 40 MG/1
40 CAPSULE, DELAYED RELEASE ORAL DAILY
Qty: 90 CAPSULE | Refills: 2 | Status: SHIPPED | OUTPATIENT
Start: 2024-09-18

## 2024-09-25 ENCOUNTER — HOSPITAL ENCOUNTER (OUTPATIENT)
Dept: MRI IMAGING | Facility: HOSPITAL | Age: 39
Discharge: HOME | End: 2024-09-25
Admitting: STUDENT IN AN ORGANIZED HEALTH CARE EDUCATION/TRAINING PROGRAM
Payer: COMMERCIAL

## 2024-09-25 DIAGNOSIS — M54.6 ACUTE MIDLINE THORACIC BACK PAIN: ICD-10-CM

## 2024-09-25 PROCEDURE — 72148 MRI LUMBAR SPINE W/O DYE: CPT

## 2024-10-16 ENCOUNTER — TREATMENT (OUTPATIENT)
Dept: PHYSICAL THERAPY | Facility: CLINIC | Age: 39
End: 2024-10-16
Payer: COMMERCIAL

## 2024-10-16 DIAGNOSIS — M54.42 ACUTE LEFT-SIDED LOW BACK PAIN WITH LEFT-SIDED SCIATICA: Primary | ICD-10-CM

## 2024-10-16 NOTE — PROGRESS NOTES
Physical Therapy Daily Treatment Note  [facility] [place]   [address], [address]  [place] [address]  P: [phone]  F: [phone]    Patient: Deepika Gonzalez   : 1985  Referring practitioner: Sam Barrera MD  Date of Initial Visit: Type: THERAPY  Noted: 2024  Today's Date: 10/16/2024  Patient seen for 2 sessions       Visit Diagnoses:    ICD-10-CM ICD-9-CM   1. Acute left-sided low back pain with left-sided sciatica  M54.42 724.2     724.3         Deepika Gonzalez reports: low back pain is improving, compliance with HEP     Subjective     Objective   See Exercise, Manual, and Modality Logs for complete treatment.   Reviewed MRIs     Assessment/Plan Tender over L SI joint, responded well to manual intervention. Plan details: Progress ROM / strengthening / stabilization / functional activity as tolerated       Timed:         Manual Therapy: 23        mins  80763;     Therapeutic Exercise:         mins  78712;     Neuromuscular Kate:        mins  10227;    Therapeutic Activity:   10       mins  16917;     Gait Training:           mins  30536;     Ultrasound:          mins  64562;    Ionto                                   mins  86635  Self Care                            mins  43596  Traction          mins 58527      Un-Timed:  Canalith Repos         mins 92055  Electrical Stimulation:         mins  19075 ( );  Dry Needling          mins self-pay  Traction          mins 80831        Timed Treatment: 33     mins   Total Treatment:   43     mins    Freida Oconnor, PT  [place] License #: [id number]    Physical Therapist

## 2024-12-04 ENCOUNTER — CONSULT (OUTPATIENT)
Dept: BARIATRICS/WEIGHT MGMT | Facility: CLINIC | Age: 39
End: 2024-12-04
Payer: COMMERCIAL

## 2024-12-04 VITALS
DIASTOLIC BLOOD PRESSURE: 86 MMHG | TEMPERATURE: 97.7 F | SYSTOLIC BLOOD PRESSURE: 139 MMHG | BODY MASS INDEX: 48.82 KG/M2 | HEIGHT: 65 IN | HEART RATE: 96 BPM | WEIGHT: 293 LBS

## 2024-12-04 DIAGNOSIS — E66.01 MORBID OBESITY WITH BMI OF 50.0-59.9, ADULT: Primary | ICD-10-CM

## 2024-12-04 DIAGNOSIS — Z71.3 WEIGHT LOSS COUNSELING, ENCOUNTER FOR: ICD-10-CM

## 2024-12-04 DIAGNOSIS — Z98.84 S/P LAPAROSCOPIC SLEEVE GASTRECTOMY: ICD-10-CM

## 2024-12-04 DIAGNOSIS — E88.819 INSULIN RESISTANCE: ICD-10-CM

## 2024-12-04 PROBLEM — R63.5 WEIGHT GAIN: Status: ACTIVE | Noted: 2024-12-04

## 2024-12-04 RX ORDER — TOPIRAMATE 50 MG/1
TABLET, FILM COATED ORAL
Qty: 37 TABLET | Refills: 0 | Status: SHIPPED | OUTPATIENT
Start: 2024-12-04 | End: 2025-01-17

## 2024-12-04 NOTE — PROGRESS NOTES
"[facility]  [address]  [facility] [id number] [phone]  [address]  [facility] [address] [phone]  Dept: [phone]  12/4/2024      Deepika Gonzalez.  [id number]  [id number]  1985  female      Chief Complaint of weight gain; unable to maintain weight loss    History of Present Illness:   Deepika is a 39 y.o. female who presents today for evaluation, education and consultation regarding weight loss with the supervision of a medical specialist and registered dietitian.     Diet History:Deepika has been overweight for at least 20 years, has been 35 pounds or more overweight for at least 20 years, has been 100 pounds or more overweight for 2 or more years and started dieting at age 32.  The most weight Deepika lost was 35 pounds on exercise and low calorie diet and maintained the weight loss for 1 year. Deepika describes her eating habits as snacking between meals, eating sweets, drinking soda. Deepika Gonzalez has tried Weight Watchers, Fasting, Physician monitored, Dietician monitored, reduced calorie, exercising, previous weight loss surgery, high protein, low carb, and calorie counting among others with success of losing up to 35 pounds, but in each instance regained the weight.     See dietician documentation for complete history.    Deepika's goal weight is 200-210 pounds.  Had sleeve gastrectomy with Dr. Martinez in 2019.  Her lowest weight was around 200#.  Initally she did very well after surgery.  She was working from home which allowed her to prioritize exercise and meal prep/cooking.  She was running 15 miles per week during pandemic.  Feels that she does better in a \"work from home\" type of job.  She is looking at jobs where she works from home in the future.  In 2020 she started school to obtain PhD.  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  Hard to have what she liked to eat at work.  She " couldn't seem to replicate the foods she had been eating at home when she returned to the office.    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  Sometimes she doesn't leave work until 7pm.    School is an additional stress.  She is up late at night currently working on her dissertation.  She will be finished in May.  Her program started with 26 people and now down to 9.    Likes asian inspired dishes.  Cucumbers, rice, chicken, shannon lettuce.  Tries to eat the rainbow.  Difficult balancing work and school.  Some days hasn't eaten all day.    Feels like still has good restriction from surgery.  1 plate of food is usually 3 meals for her.    After surgery she was utilizing [employer].  She worked hard at [employer].  She was eating 3 meals per day with hp snacks.  She was getting about 100 g protein per day even on a mostly vegan diet with the help of Paprika Labier.  Finds that she snacks at work.  Some days she realizes she hasn't eaten at 4 pm.  Snacks usually consist of chips of nutrigrain granola bars.  She drinks coffee for breakfast in the morning due to her fatigue and states it is sweetened with sugar.      Deepika drinks approximately 50 ounces of water daily.  Her beverages consist mostly of Liquid IV in water, low sugar gatorade, and coke.  No etoh.      Her director is on weight loss medication.  She thinks it is Ozempic.  She has been on for about a year and shared her experience.      Bariatric Evaluation: The patient is being seen for an initial visit for weight evaluation and education.     Bariatric Co-morbidities:  hypertension, GERD, and polycystic ovarian disease    Patient Active Problem List   Diagnosis    PCOS (polycystic ovarian syndrome)    Pseudotumor cerebri    HTN (hypertension)    Insulin resistance    Fatigue    Dyspepsia    Allergic rhinitis    Lower extremity edema    Anxiety    Paresthesias    Gastroesophageal reflux disease    Morbid obesity with BMI of  50.0-59.9, adult    S/P laparoscopic sleeve gastrectomy    Weight gain    Weight loss counseling, encounter for       Past Medical History:   Diagnosis Date    Anemia     Anxiety     Asthma     Dental crown present     lower left     Depression     GERD (gastroesophageal reflux disease)     required PPI in remote past, prior to WLS.      HTN (hypertension)     Insulin resistance     Obesity 2024    PCOS (polycystic ovarian syndrome)     Pseudotumor cerebri     Seasonal allergies     Wears glasses        Past Surgical History:   Procedure Laterality Date    EAR TUBES  1995    x 3    [facility]    ENDOSCOPY N/A 03/27/2019    Procedure: ESOPHAGOGASTRODUODENOSCOPY;  Surgeon: Daquan Martinez MD;  Location:  [facility];  Service: Bariatric    GASTRIC SLEEVE LAPAROSCOPIC N/A 03/27/2019    Procedure: GASTRIC SLEEVE LAPAROSCOPIC;  Surgeon: Daquan Martinez MD;  Location:  [facility];  Service: Bariatric       No Known Allergies      Current Outpatient Medications:     Magnesium 250 MG capsule, Take  by mouth., Disp: , Rfl:     multivitamin with minerals tablet tablet, Take 1 tablet by mouth Daily., Disp: , Rfl:     omeprazole (priLOSEC) 40 MG capsule, Take 1 capsule by mouth Daily., Disp: 90 capsule, Rfl: 2    Pediatric Multivit-Minerals (FLINTSTONES + EXTRA IRON PO), Take  by mouth., Disp: , Rfl:     vitamin D3 125 MCG (5000 UT) capsule capsule, Take 1 capsule by mouth Daily., Disp: , Rfl:     Phentermine HCl 8 MG tablet, Take 4 mg by mouth Every Morning Before Breakfast for 14 days, THEN 8 mg Every Morning Before Breakfast for 30 days., Disp: 37 each, Rfl: 0    tiZANidine (ZANAFLEX) 4 MG tablet, Take 1 tablet by mouth Every 6 (Six) Hours As Needed for Muscle Spasms. (Patient not taking: Reported on 12/4/2024), Disp: 45 tablet, Rfl: 0    topiramate (TOPAMAX) 50 MG tablet, Take 0.5 tablets by mouth Every Evening for 14 days, THEN 1 tablet Every Evening for 30 days., Disp: 37  tablet, Rfl: 0    Social History     Socioeconomic History    Marital status: Single    Years of education: Masters Degree    Tobacco Use    Smoking status: Never    Smokeless tobacco: Never   Vaping Use    Vaping status: Never Used   Substance and Sexual Activity    Alcohol use: Not Currently    Drug use: No    Sexual activity: Not Currently     Partners: Female     Birth control/protection: Abstinence       Family History   Problem Relation Age of Onset    Obesity Mother     Hypertension Mother     Sleep apnea Mother     Depression Mother     Arthritis Mother     Obesity Father     Hypertension Father     Arthritis Father     Sleep apnea Father     Alcohol abuse Father     Drug abuse Father     Heart disease Father     Depression Brother     Alcohol abuse Brother     Asthma Brother     Depression Brother     Drug abuse Brother     Heart disease Maternal Grandmother     Hypertension Maternal Grandmother     Hypertension Paternal Grandmother     Cancer Paternal Grandmother     Heart disease Paternal Grandmother     Obesity Paternal Grandmother     Sleep apnea Paternal Grandmother     Diabetes Paternal Grandmother     Lupus Paternal Grandmother     Cancer Paternal Grandfather     Alcohol abuse Paternal Grandfather     Hypertension Paternal Grandfather          Review of Systems:  Review of Systems   Constitutional:  Positive for fatigue. Negative for chills, diaphoresis and fever.   HENT:  Negative for congestion and sore throat.    Respiratory:  Negative for cough and shortness of breath.    Cardiovascular:  Negative for chest pain.   Gastrointestinal:  Negative for abdominal pain, nausea and vomiting.   Genitourinary:  Negative for dysuria.   Musculoskeletal:  Positive for neck pain. Negative for myalgias.   Skin:  Negative for rash.   Neurological:  Positive for numbness and headaches. Negative for weakness.       Physical Exam:  Vital Signs:  Weight: (!) 141 kg (311 lb)   Body mass index is 51.82 kg/m².  Temp:  97.7 °F (36.5 °C)   Heart Rate: 96   BP: 139/86     Physical Exam  Vitals reviewed.   Constitutional:       General: She is not in acute distress.     Appearance: Normal appearance. She is morbidly obese.   HENT:      Head: Normocephalic and atraumatic.      Mouth/Throat:      Mouth: Mucous membranes are moist.   Eyes:      General: No scleral icterus.     Extraocular Movements: Extraocular movements intact.      Conjunctiva/sclera: Conjunctivae normal.      Pupils: Pupils are equal, round, and reactive to light.   Cardiovascular:      Rate and Rhythm: Normal rate and regular rhythm.      Heart sounds: Normal heart sounds. No murmur heard.     No gallop.   Pulmonary:      Effort: Pulmonary effort is normal. No respiratory distress.      Breath sounds: Normal breath sounds.   Abdominal:      General: Bowel sounds are normal. There is no distension.      Palpations: Abdomen is soft. There is no mass.      Tenderness: There is no abdominal tenderness. There is no guarding.   Musculoskeletal:         General: Normal range of motion.      Cervical back: Normal range of motion and neck supple.   Skin:     General: Skin is warm and dry.   Neurological:      General: No focal deficit present.      Mental Status: She is alert and oriented to person, place, and time.   Psychiatric:         Mood and Affect: Mood normal.         Behavior: Behavior normal.         Thought Content: Thought content normal.         Judgment: Judgment normal.            Assessment:         Deepika Gonzalez is a 39 y.o. year old female with Body mass index is 51.82 kg/m². . Weight: (!) 141 kg (311 lb), Body mass index is 51.82 kg/m². and weight related problems including hypertension, GERD, and polycystic ovarian disease.    Consultant notes from bariatric surgery office and notes from pcp as well as labs from October and August were reviewed.        The patient was advised to start a reduced calorie, high protein, low fat and low carbohydrate diet   start routine exercise including but not limited to 150 minutes per week. The patient was given individualized information by our dietician along with handouts.     I think she is a good candidate for medical weight loss, and is interested in trying to track her macronutrient intake using a dietary kate.    Encounter Diagnoses   Name Primary?    Morbid obesity with BMI of 50.0-59.9, adult Yes    S/P laparoscopic sleeve gastrectomy     Weight loss counseling, encounter for     Insulin resistance        Plan:    Diagnoses and all orders for this visit:    1. Morbid obesity with BMI of 50.0-59.9, adult (Primary)  Overview:  Added automatically from request for surgery [id number]    Orders:  -     Phentermine HCl 8 MG tablet; Take 4 mg by mouth Every Morning Before Breakfast for 14 days, THEN 8 mg Every Morning Before Breakfast for 30 days.  Dispense: 37 each; Refill: 0    2. S/P laparoscopic sleeve gastrectomy    3. Weight loss counseling, encounter for    4. Insulin resistance    Other orders  -     topiramate (TOPAMAX) 50 MG tablet; Take 0.5 tablets by mouth Every Evening for 14 days, THEN 1 tablet Every Evening for 30 days.  Dispense: 37 tablet; Refill: 0         Discussed several options at visit today.  Discussed phentermine, qsymia, glp1 medications as well as risk/benefits to all.  She does not have insurance coverage for weight loss medications at this time.  Also discussed possibility of using compounding pharmacy in order to have access to glp 1 medications.  Discussed encouraging role of glp1 meds on pcos and insulin resistance.    Also discussed that data supports surgical treatment for obesity over medications when patients would like to lose 100#.  Wegovy studies demonstrated an approx 10% weight loss and Zepbound an approx 15% weight loss.    She would like to try the Qsymia option for now.  Did discuss that would prescribe as 2 generic prescriptions. Discussed 1/2 tabs for 14 days and increasing to a full  tab for a month.      Patient will be evaluated by a bariatric dietician individually. We discussed weight loss program options regarding different diet plans and structures and discussed physiology related to hunger, fullness, satiety, and how different macronutrient's are processed and may contribute to the sustainability of her dietary plan.     Total time spent during this encounter today was 70 minutes and includes preparing for the visit, reviewing tests, performing a medically appropriate examination and/or evaluations, counseling and educating the patient/family/caregiver, ordering medications, tests, or procedures, documenting information in the medical record, and independently interpreting results and communicating that information with the patient/family/caregiver.    The patient was instructed to follow up in 6 weeks.     Asia Sprague, APRN  12/4/2024

## 2024-12-23 ENCOUNTER — HOSPITAL ENCOUNTER (EMERGENCY)
Facility: HOSPITAL | Age: 39
Discharge: HOME | End: 2024-12-24
Attending: EMERGENCY MEDICINE | Admitting: EMERGENCY MEDICINE
Payer: COMMERCIAL

## 2024-12-23 DIAGNOSIS — K08.89 PAIN, DENTAL: Primary | ICD-10-CM

## 2024-12-23 PROCEDURE — 99283 EMERGENCY DEPT VISIT LOW MDM: CPT

## 2024-12-24 VITALS
TEMPERATURE: 98.1 F | HEART RATE: 90 BPM | SYSTOLIC BLOOD PRESSURE: 152 MMHG | WEIGHT: 293 LBS | RESPIRATION RATE: 18 BRPM | DIASTOLIC BLOOD PRESSURE: 84 MMHG | OXYGEN SATURATION: 98 % | BODY MASS INDEX: 48.82 KG/M2 | HEIGHT: 65 IN

## 2024-12-24 RX ADMIN — DIPHENHYDRAMINE HCL ORAL: 25 SOLUTION ORAL at 00:54

## 2024-12-24 NOTE — FSED PROVIDER NOTE
Subjective   History of Present Illness    Patient is 39-year-old woman who had been having left lower dental pain for approximately 2 weeks.  Some have been constant and fluctuating.  She had been seen by her dentist and prescribed amoxicillin and hydrocodone.  She was also prescribed lidocaine which she had used in the past which had helped her pain.  Unfortunately she states the pharmacy was closed and she was unable to  this medication.  She denies any nausea or vomiting or throat discomfort.  She had fevers last weekend but none since.      Review of Systems    Past Medical History:   Diagnosis Date    Anemia     Anxiety     Asthma     Dental crown present     lower left     Depression     GERD (gastroesophageal reflux disease)     required PPI in remote past, prior to WLS.      HTN (hypertension)     Insulin resistance     Obesity 2024    PCOS (polycystic ovarian syndrome)     Pseudotumor cerebri     Seasonal allergies     Wears glasses        No Known Allergies    Past Surgical History:   Procedure Laterality Date    EAR TUBES  1995    x 3    ENDOSCOPY  2005    Dr Hay in [facility],  [facility] reflux    ENDOSCOPY N/A 03/27/2019    Procedure: ESOPHAGOGASTRODUODENOSCOPY;  Surgeon: Daquan Martinez MD;  Location:  [facility];  Service: Bariatric    GASTRIC SLEEVE LAPAROSCOPIC N/A 03/27/2019    Procedure: GASTRIC SLEEVE LAPAROSCOPIC;  Surgeon: Daquan Martinez MD;  Location:  [facility];  Service: Bariatric       Family History   Problem Relation Age of Onset    Obesity Mother     Hypertension Mother     Sleep apnea Mother     Depression Mother     Arthritis Mother     Obesity Father     Hypertension Father     Arthritis Father     Sleep apnea Father     Alcohol abuse Father     Drug abuse Father     Heart disease Father     Depression Brother     Alcohol abuse Brother     Asthma Brother     Depression Brother     Drug abuse Brother     Heart disease Maternal Grandmother     Hypertension Maternal Grandmother      Hypertension Paternal Grandmother     Cancer Paternal Grandmother     Heart disease Paternal Grandmother     Obesity Paternal Grandmother     Sleep apnea Paternal Grandmother     Diabetes Paternal Grandmother     Lupus Paternal Grandmother     Cancer Paternal Grandfather     Alcohol abuse Paternal Grandfather     Hypertension Paternal Grandfather        Social History     Socioeconomic History    Marital status: Single    Years of education: Masters Degree    Tobacco Use    Smoking status: Never    Smokeless tobacco: Never   Vaping Use    Vaping status: Never Used   Substance and Sexual Activity    Alcohol use: Not Currently    Drug use: No    Sexual activity: Not Currently     Partners: Female     Birth control/protection: Abstinence           Objective   Physical Exam  Vitals and nursing note reviewed.   Constitutional:       General: She is in acute distress.      Appearance: Normal appearance. She is not ill-appearing or toxic-appearing.   HENT:      Head: Normocephalic and atraumatic.      Comments: No facial swelling or erythema.     Nose: Nose normal.      Mouth/Throat:      Mouth: Mucous membranes are moist.      Comments: Tenderness to the  left lower jaw.  No gum swelling.  No purulent drainage.  Floor the mouth is soft and nontender.  Eyes:      Extraocular Movements: Extraocular movements intact.   Pulmonary:      Effort: Pulmonary effort is normal.   Musculoskeletal:         General: Normal range of motion.      Cervical back: Normal range of motion and neck supple.   Skin:     General: Skin is warm and dry.      Capillary Refill: Capillary refill takes less than 2 seconds.   Neurological:      General: No focal deficit present.      Mental Status: She is alert.         Procedures           ED Course                                           Medical Decision Making    Patient is a 39-year-old woman who had been having dental pain for approximate 2 weeks to the left lower jaw.  Initially she had  fevers but none since.  She was seen by her dentist and placed on hydrocodone and amoxicillin.  She had also had used lidocaine numbing to the gums which had helped her pain however when she tried to  her prescription for this this evening the pharmacy was closed.  On examination she appears well-nourished well-developed mild distress.  There is no facial swelling and there is no gum swelling.  Patient be given dental ball and advised to continue taking her hydrocodone and amoxicillin.  Patient will return if any concerns.    Final diagnoses:   Pain, dental       ED Disposition  ED Disposition       ED Disposition   Discharge    Condition   Stable    Comment   --               Sam Barrera MD  [address] [id number] [phone]    In 1 week           Medication List      No changes were made to your prescriptions during this visit.

## 2024-12-24 NOTE — DISCHARGE INSTRUCTIONS
Continue taking her medication as prescribed by your dentist.  Use dental ball applied to dental pain.  Follow-up with your provider.  Seek immediate medical attention if having any concerns.

## 2024-12-31 ENCOUNTER — HOSPITAL ENCOUNTER (EMERGENCY)
Facility: HOSPITAL | Age: 39
Discharge: HOME | End: 2024-12-31
Attending: EMERGENCY MEDICINE | Admitting: EMERGENCY MEDICINE
Payer: COMMERCIAL

## 2024-12-31 VITALS
SYSTOLIC BLOOD PRESSURE: 123 MMHG | DIASTOLIC BLOOD PRESSURE: 58 MMHG | OXYGEN SATURATION: 98 % | HEART RATE: 98 BPM | TEMPERATURE: 99.7 F | RESPIRATION RATE: 18 BRPM

## 2024-12-31 DIAGNOSIS — L60.8 NAIL DISCOLORATION: Primary | ICD-10-CM

## 2024-12-31 LAB
ALBUMIN SERPL-MCNC: 4.2 G/DL (ref 3.5–5.2)
ALBUMIN/GLOB SERPL: 1.1 G/DL
ALP SERPL-CCNC: 81 U/L (ref 39–117)
ALT SERPL W P-5'-P-CCNC: 7 U/L (ref 1–33)
ANION GAP SERPL CALCULATED.3IONS-SCNC: 11.5 MMOL/L (ref 5–15)
APAP SERPL-MCNC: <5 MCG/ML (ref 0–30)
AST SERPL-CCNC: 13 U/L (ref 1–32)
BASOPHILS # BLD AUTO: 0.01 10*3/MM3 (ref 0–0.2)
BASOPHILS NFR BLD AUTO: 0.1 % (ref 0–1.5)
BILIRUB SERPL-MCNC: 0.5 MG/DL (ref 0–1.2)
BUN SERPL-MCNC: 11 MG/DL (ref 6–20)
BUN/CREAT SERPL: 13.8 (ref 7–25)
CALCIUM SPEC-SCNC: 9.3 MG/DL (ref 8.6–10.5)
CHLORIDE SERPL-SCNC: 104 MMOL/L (ref 98–107)
CO2 SERPL-SCNC: 19.5 MMOL/L (ref 22–29)
CREAT SERPL-MCNC: 0.8 MG/DL (ref 0.57–1)
DEPRECATED RDW RBC AUTO: 46.6 FL (ref 37–54)
EGFRCR SERPLBLD CKD-EPI 2021: 96.3 ML/MIN/1.73
EOSINOPHIL # BLD AUTO: 0 10*3/MM3 (ref 0–0.4)
EOSINOPHIL NFR BLD AUTO: 0 % (ref 0.3–6.2)
ERYTHROCYTE [DISTWIDTH] IN BLOOD BY AUTOMATED COUNT: 15.8 % (ref 12.3–15.4)
GLOBULIN UR ELPH-MCNC: 3.8 GM/DL
GLUCOSE SERPL-MCNC: 118 MG/DL (ref 65–99)
HCT VFR BLD AUTO: 34.6 % (ref 34–46.6)
HGB BLD-MCNC: 11.4 G/DL (ref 12–15.9)
IMM GRANULOCYTES # BLD AUTO: 0.04 10*3/MM3 (ref 0–0.05)
IMM GRANULOCYTES NFR BLD AUTO: 0.5 % (ref 0–0.5)
LYMPHOCYTES # BLD AUTO: 0.86 10*3/MM3 (ref 0.7–3.1)
LYMPHOCYTES NFR BLD AUTO: 9.8 % (ref 19.6–45.3)
MCH RBC QN AUTO: 26.8 PG (ref 26.6–33)
MCHC RBC AUTO-ENTMCNC: 32.9 G/DL (ref 31.5–35.7)
MCV RBC AUTO: 81.4 FL (ref 79–97)
MONOCYTES # BLD AUTO: 0.21 10*3/MM3 (ref 0.1–0.9)
MONOCYTES NFR BLD AUTO: 2.4 % (ref 5–12)
NEUTROPHILS NFR BLD AUTO: 7.7 10*3/MM3 (ref 1.7–7)
NEUTROPHILS NFR BLD AUTO: 87.2 % (ref 42.7–76)
NRBC BLD AUTO-RTO: 0 /100 WBC (ref 0–0.2)
PLATELET # BLD AUTO: 538 10*3/MM3 (ref 140–450)
PMV BLD AUTO: 8.5 FL (ref 6–12)
POTASSIUM SERPL-SCNC: 3.9 MMOL/L (ref 3.5–5.2)
PROT SERPL-MCNC: 8 G/DL (ref 6–8.5)
RBC # BLD AUTO: 4.25 10*6/MM3 (ref 3.77–5.28)
SODIUM SERPL-SCNC: 135 MMOL/L (ref 136–145)
WBC NRBC COR # BLD AUTO: 8.82 10*3/MM3 (ref 3.4–10.8)

## 2024-12-31 PROCEDURE — 99283 EMERGENCY DEPT VISIT LOW MDM: CPT

## 2024-12-31 PROCEDURE — 80053 COMPREHEN METABOLIC PANEL: CPT | Performed by: PHYSICIAN ASSISTANT

## 2024-12-31 PROCEDURE — 36415 COLL VENOUS BLD VENIPUNCTURE: CPT

## 2024-12-31 PROCEDURE — 80143 DRUG ASSAY ACETAMINOPHEN: CPT | Performed by: PHYSICIAN ASSISTANT

## 2024-12-31 PROCEDURE — 85025 COMPLETE CBC W/AUTO DIFF WBC: CPT | Performed by: PHYSICIAN ASSISTANT

## 2024-12-31 NOTE — ED NOTES
Pt states she took 5 or 6 650 mg tablets of tylenol yesterday (12/30) and 2 650 mg tablets of tylenol today (12/31)

## 2025-01-01 NOTE — ED PROVIDER NOTES
EMERGENCY DEPARTMENT MD ATTESTATION NOTE    Room Number:  HA2/B  PCP: Sam Barrera MD  Independent Historians: Patient    HPI:  A complete HPI/ROS/PMH/PSH/SH/FH are unobtainable due to: None    Chronic or social conditions impacting patient care (Social Determinants of Health): None      Context: Deepika Gonzalez is a 39 y.o. female with a medical history of hypertension, pseudotumor cerebri who presents to the ED c/o acute concern about Tylenol overdose.  The patient reports for the last 2 weeks she has had pain in her left lower premolars.  She states that she has been taking Tylenol for this pain.  She had the tooth extracted today.  She reports that she had to have it done close to [place] because that is the only place that would accept her on her timeline.  She states on the way back she noticed that her fingers were different color than typical.  She notes that she has been taking 6 tablets of 650 mg Tylenol per day.  She states that the urgent care center told her that this was too much.  She also has hydrocodone but she states she has not been taking that out of fear for addiction given her family history.  She denies abdominal pain.  She denies nausea and vomiting.        Review of prior external notes (non-ED) -and- Review of prior external test results outside of this encounter:  Laboratory evaluation 10/3/2024 shows normal CMP, normal CBC except for hemoglobin 10.5    Prescription drug monitoring program review:           PHYSICAL EXAM    I have reviewed the triage vital signs and nursing notes.    ED Triage Vitals   Temp Heart Rate Resp BP SpO2   12/31/24 1554 12/31/24 1554 12/31/24 1554 12/31/24 1556 12/31/24 1554   99.7 °F (37.6 °C) (!) 130 18 (!) 193/99 96 %      Temp src Heart Rate Source Patient Position BP Location FiO2 (%)   -- 12/31/24 1554 -- -- --    Monitor          Physical Exam  GENERAL: Awake, alert, no acute distress  SKIN: Warm, dry  HENT: Normocephalic, atraumatic.  Left lower  premolar region with no drainable fluid collection.  Surgical wound is well-healing.  EYES: no scleral icterus  CV: regular rhythm, regular rate  RESPIRATORY: normal effort, lungs clear  ABDOMEN: soft, nontender, nondistended  MUSCULOSKELETAL: no deformity, no abnormal coloration to her fingers  NEURO: alert, moves all extremities, follows commands            MEDICATIONS GIVEN IN ER  Medications - No data to display      ORDERS PLACED DURING THIS VISIT:  Orders Placed This Encounter   Procedures    Comprehensive Metabolic Panel    Acetaminophen Level    CBC Auto Differential    CBC & Differential         PROCEDURES  Procedures            PROGRESS, DATA ANALYSIS, CONSULTS, AND MEDICAL DECISION MAKING  All labs have been independently interpreted by me.  All radiology studies have been reviewed by me. All EKG's have been independently viewed and interpreted by me.  Discussion below represents my analysis of pertinent findings related to patient's condition, differential diagnosis, treatment plan and final disposition.    Differential diagnosis includes but is not limited to worried well, chronic Tylenol overdose, acute Tylenol overdose, anesthesia side effect, dehydration.    Clinical Scores:                                     ED Course as of 01/01/25 0045   Tue Dec 31, 2024   1924 Acetaminophen: <5.0 [TR]   1924 ALT (SGPT): 7 [TR]   1924 AST (SGOT): 13 [TR]   1949 Acetaminophen: <5.0 [KA]   1949 Glucose(!): 118 [KA]   1949 Sodium(!): 135 [KA]   1949 WBC: 8.82 [KA]   1949 Hemoglobin(!): 11.4 [KA]      ED Course User Index  [KA] Keily Blank PA-C  [TR] Frank Guaman MD       MDM: The patient appears in no distress.  She has no abdominal pain on exam.  Her 6 tablets of 650 mg of Tylenol is just under the 4g a day maximum.  Plan to obtain Tylenol level, chemistries and blood counts.      COMPLEXITY OF CARE  Admission was considered but after careful review of the patient's presentation, physical examination,  diagnostic results, and response to treatment the patient may be safely discharged with outpatient follow-up.    Please note that portions of this document were completed with a voice recognition program.    Note Disclaimer: At [facility], we believe that sharing information builds trust and better relationships. You are receiving this note because you recently visited [facility]. It is possible you will see health information before a provider has talked with you about it. This kind of information can be easy to misunderstand. To help you fully understand what it means for your health, we urge you to discuss this note with your provider.         Frank Guaman MD  01/01/25 0045

## 2025-01-01 NOTE — ED PROVIDER NOTES
EMERGENCY DEPARTMENT ENCOUNTER  Room Number:  HA2/B  PCP: Sam Barrera MD  Independent Historians: Patient      HPI:  Chief Complaint: had concerns including Circulatory Problem.     A complete HPI/ROS/PMH/PSH/SH/FH are unobtainable due to: None    Chronic or social conditions impacting patient care (Social Determinants of Health): None      Context: The patient is a 39 y.o. female with a medical history of PCOS, anxiety, paresthesias who presents to the ED c/o acute fingernail discoloration and concerns that she may have taken too much Tylenol.  She states that she dropped Paia today to have a tooth pulled and she could not get into an oral surgeon and willful in a timely manner.  She was sedated for her procedure.  Several hours after arriving home she thought her fingernails looked pale.  She also notes she has been taking Tylenol 650 mg tablets, 2 tablets 3 times a day.  She was prescribed hydrocodone for her dental pain and sometimes she takes 1 hydrocodone and one of the 650 mg Tylenol together instead of 2 Tylenol.  No other symptoms.  No nausea vomiting, her dental pain is well-controlled.      Review of prior external notes (non-ED) -and- Review of prior external test results outside of this encounter:  Labs performed 10/3/2024 and creatinine was 0.79, potassium 4.3, hemoglobin A1c was 4 point        PAST MEDICAL HISTORY  Active Ambulatory Problems     Diagnosis Date Noted    PCOS (polycystic ovarian syndrome)     Pseudotumor cerebri     HTN (hypertension)     Insulin resistance     Fatigue     Dyspepsia     Allergic rhinitis     Lower extremity edema     Anxiety     Paresthesias     Gastroesophageal reflux disease 07/20/2018    Morbid obesity with BMI of 50.0-59.9, adult 03/19/2019    S/P laparoscopic sleeve gastrectomy 12/04/2024    Weight gain 12/04/2024    Weight loss counseling, encounter for 12/04/2024     Resolved Ambulatory Problems     Diagnosis Date Noted    Wheezing      Past Medical  History:   Diagnosis Date    Anemia     Asthma     Dental crown present     Depression     GERD (gastroesophageal reflux disease)     Obesity 2024    Seasonal allergies     Wears glasses          PAST SURGICAL HISTORY  Past Surgical History:   Procedure Laterality Date    EAR TUBES  1995    x 3    ENDOSCOPY  2005    Dr Hay in [facility],  showed reflux    ENDOSCOPY N/A 03/27/2019    Procedure: ESOPHAGOGASTRODUODENOSCOPY;  Surgeon: Daquan Martinez MD;  Location:  [facility];  Service: Bariatric    GASTRIC SLEEVE LAPAROSCOPIC N/A 03/27/2019    Procedure: GASTRIC SLEEVE LAPAROSCOPIC;  Surgeon: Daquan Martinez MD;  Location:  [facility];  Service: Bariatric         FAMILY HISTORY  Family History   Problem Relation Age of Onset    Obesity Mother     Hypertension Mother     Sleep apnea Mother     Depression Mother     Arthritis Mother     Obesity Father     Hypertension Father     Arthritis Father     Sleep apnea Father     Alcohol abuse Father     Drug abuse Father     Heart disease Father     Depression Brother     Alcohol abuse Brother     Asthma Brother     Depression Brother     Drug abuse Brother     Heart disease Maternal Grandmother     Hypertension Maternal Grandmother     Hypertension Paternal Grandmother     Cancer Paternal Grandmother     Heart disease Paternal Grandmother     Obesity Paternal Grandmother     Sleep apnea Paternal Grandmother     Diabetes Paternal Grandmother     Lupus Paternal Grandmother     Cancer Paternal Grandfather     Alcohol abuse Paternal Grandfather     Hypertension Paternal Grandfather          SOCIAL HISTORY  Social History     Socioeconomic History    Marital status: Single    Years of education: Masters Degree    Tobacco Use    Smoking status: Never    Smokeless tobacco: Never   Vaping Use    Vaping status: Never Used   Substance and Sexual Activity    Alcohol use: Not Currently    Drug use: No    Sexual activity: Not Currently     Partners: Female     Birth control/protection:  Abstinence         ALLERGIES  Patient has no known allergies.      REVIEW OF SYSTEMS  Review of Systems  Included in HPI  All systems reviewed and negative except for those discussed in HPI.      PHYSICAL EXAM    I have reviewed the triage vital signs and nursing notes.    ED Triage Vitals   Temp Heart Rate Resp BP SpO2   12/31/24 1554 12/31/24 1554 12/31/24 1554 12/31/24 1556 12/31/24 1554   99.7 °F (37.6 °C) (!) 130 18 (!) 193/99 96 %      Temp src Heart Rate Source Patient Position BP Location FiO2 (%)   -- 12/31/24 1554 -- -- --    Monitor          Physical Exam  GENERAL: alert, no acute distress  SKIN: Warm, dry fingernails normal color, brisk cap refill, no cyanosis or clubbing.  There is a lightly colored translucent stripe along some of her nails extending from the nailbed to the tip of the nail  HENT: Normocephalic, atraumatic  EYES: no scleral icterus  CV: regular rhythm, regular rate  RESPIRATORY: normal effort, lungs clear  ABDOMEN: nondistended soft nontender.  Your nails are  MUSCULOSKELETAL: no deformity  NEURO: alert, moves all extremities, follows commands            LAB RESULTS  Labs Reviewed   COMPREHENSIVE METABOLIC PANEL - Abnormal; Notable for the following components:       Result Value    Glucose 118 (*)     Sodium 135 (*)     CO2 19.5 (*)     All other components within normal limits    Narrative:     GFR Categories in Chronic Kidney Disease (CKD)      GFR Category          GFR (mL/min/1.73)    Interpretation  G1                     90 or greater         Normal or high (1)  G2                      60-89                Mild decrease (1)  G3a                   45-59                Mild to moderate decrease  G3b                   30-44                Moderate to severe decrease  G4                    15-29                Severe decrease  G5                    14 or less           Kidney failure          (1)In the absence of evidence of kidney disease, neither GFR category G1 or G2 fulfill the  criteria for CKD.    eGFR calculation 2021 CKD-EPI creatinine equation, which does not include race as a factor   CBC WITH AUTO DIFFERENTIAL - Abnormal; Notable for the following components:    Hemoglobin 11.4 (*)     RDW 15.8 (*)     Platelets 538 (*)     Neutrophil % 87.2 (*)     Lymphocyte % 9.8 (*)     Monocyte % 2.4 (*)     Eosinophil % 0.0 (*)     Neutrophils, Absolute 7.70 (*)     All other components within normal limits   ACETAMINOPHEN LEVEL - Normal   CBC AND DIFFERENTIAL    Narrative:     The following orders were created for panel order CBC & Differential.  Procedure                               Abnormality         Status                     ---------                               -----------         ------                     CBC Auto Differential[[id number]]        Abnormal            Final result                 Please view results for these tests on the individual orders.             RADIOLOGY  No Radiology Exams Resulted Within Past 24 Hours      MEDICATIONS GIVEN IN ER  Medications - No data to display      ORDERS PLACED DURING THIS VISIT:  Orders Placed This Encounter   Procedures    Comprehensive Metabolic Panel    Acetaminophen Level    CBC Auto Differential    CBC & Differential         OUTPATIENT MEDICATION MANAGEMENT:  No current Epic-ordered facility-administered medications on file.     Current Outpatient Medications Ordered in Epic   Medication Sig Dispense Refill    amoxicillin (AMOXIL) 875 MG tablet Take 1 tablet by mouth Every 12 (Twelve) Hours.      amoxicillin-clavulanate (AUGMENTIN) 875-125 MG per tablet Take 1 tablet by mouth 2 (Two) Times a Day for 10 days. 20 tablet 0    Lidocaine Viscous HCl (XYLOCAINE) 2 % solution Take 5 mL by mouth.      Magnesium 250 MG capsule Take  by mouth.      multivitamin with minerals tablet tablet Take 1 tablet by mouth Daily.      omeprazole (priLOSEC) 40 MG capsule Take 1 capsule by mouth Daily. 90 capsule 2    Pediatric Multivit-Minerals  (FLINTSTONES + EXTRA IRON PO) Take  by mouth.      Phentermine HCl 8 MG tablet Take 4 mg by mouth Every Morning Before Breakfast for 14 days, THEN 8 mg Every Morning Before Breakfast for 30 days. 37 each 0    topiramate (TOPAMAX) 50 MG tablet Take 1 tablet by mouth Every Evening for 30 days. 30 tablet 0    vitamin D3 125 MCG (5000 UT) capsule capsule Take 1 capsule by mouth Daily.           PROCEDURES  Procedures            PROGRESS, DATA ANALYSIS, CONSULTS, AND MEDICAL DECISION MAKING  All labs have been independently interpreted by me.  All radiology studies have been reviewed by me. All EKG's have been independently viewed and interpreted by me.  Discussion below represents my analysis of pertinent findings related to patient's condition, differential diagnosis, treatment plan and final disposition.    DIFFERENTIAL    My differential diagnosis includes was not limited to cyanosis, Raynaud's, accidental Tylenol overdose    Clinical Scores:                  ED Course as of 01/02/25 2254   Tue Dec 31, 2024   1924 Acetaminophen: <5.0 [TR]   1924 ALT (SGPT): 7 [TR]   1924 AST (SGOT): 13 [TR]   1949 Acetaminophen: <5.0 [KA]   1949 Glucose(!): 118 [KA]   1949 Sodium(!): 135 [KA]   1949 WBC: 8.82 [KA]   1949 Hemoglobin(!): 11.4 [KA]      ED Course User Index  [KA] Keily Blank PA-C  [TR] Frank Guaman MD       Patient does have a slightly pigmented stripe on some of her nails.  Recommended follow-up with dermatology for this.  She states it has been there for years.  She has no other abnormalities on exam, no cyanosis, she has brisk cap refill.  Based on the report amount of Tylenol taken, she has not taken enough to overdose and her labs are normal.  She can continue her current dose of Tylenol, return precautions discussed.      AS OF 22:54 EST VITALS:    BP - 123/58  HR - 98  TEMP - 99.7 °F (37.6 °C)  O2 SATS - 98%    COMPLEXITY OF CARE  Admission was considered but after careful review of the patient's  presentation, physical examination, diagnostic results, and response to treatment the patient may be safely discharged with outpatient follow-up.      DIAGNOSIS  Final diagnoses:   Nail discoloration         DISPOSITION  ED Disposition       ED Disposition   Discharge    Condition   Good    Comment   --                  FOLLOW UP  Sam Barrera MD  [address]  [facility] [id number] [phone]    Schedule an appointment as soon as possible for a visit       [facility]  [address] [id number] [phone]        [facility]  [address]  [facility] [address] [phone]            Prescribed Medications     Medication List        ASK your doctor about these medications      HYDROcodone-acetaminophen 5-325 MG per tablet  Commonly known as: NORCO  Take 1 tablet by mouth Every 6 (Six) Hours As Needed for Moderate Pain for up to 3 days.  Ask about: Should I take this medication?                        Please note that portions of this document were completed with a voice recognition program.    Note Disclaimer: At [facility], we believe that sharing information builds trust and better relationships. You are receiving this note because you recently visited [facility]. It is possible you will see health information before a provider has talked with you about it. This kind of information can be easy to misunderstand. To help you fully understand what it means for your health, we urge you to discuss this note with your provider.         Keily Blank PA-C  01/02/25 4793

## 2025-01-02 RX ORDER — TOPIRAMATE 50 MG/1
50 TABLET, FILM COATED ORAL EVERY EVENING
Qty: 30 TABLET | Refills: 0 | Status: SHIPPED | OUTPATIENT
Start: 2025-01-02 | End: 2025-02-01

## 2025-01-23 ENCOUNTER — OFFICE VISIT (OUTPATIENT)
Dept: INTERNAL MEDICINE | Facility: CLINIC | Age: 40
End: 2025-01-23
Payer: COMMERCIAL

## 2025-01-23 VITALS
SYSTOLIC BLOOD PRESSURE: 125 MMHG | HEART RATE: 101 BPM | WEIGHT: 293 LBS | DIASTOLIC BLOOD PRESSURE: 76 MMHG | OXYGEN SATURATION: 99 % | BODY MASS INDEX: 48.82 KG/M2 | HEIGHT: 65 IN

## 2025-01-23 DIAGNOSIS — D50.9 IRON DEFICIENCY ANEMIA, UNSPECIFIED IRON DEFICIENCY ANEMIA TYPE: Primary | ICD-10-CM

## 2025-01-23 DIAGNOSIS — E61.1 IRON DEFICIENCY: ICD-10-CM

## 2025-01-23 DIAGNOSIS — D50.0 IRON DEFICIENCY ANEMIA DUE TO CHRONIC BLOOD LOSS: Primary | ICD-10-CM

## 2025-01-23 DIAGNOSIS — R53.83 FATIGUE, UNSPECIFIED TYPE: ICD-10-CM

## 2025-01-23 DIAGNOSIS — Z79.899 HIGH RISK MEDICATION USE: ICD-10-CM

## 2025-01-23 PROCEDURE — 99214 OFFICE O/P EST MOD 30 MIN: CPT | Performed by: STUDENT IN AN ORGANIZED HEALTH CARE EDUCATION/TRAINING PROGRAM

## 2025-01-23 RX ORDER — SODIUM CHLORIDE 9 MG/ML
20 INJECTION, SOLUTION INTRAVENOUS ONCE
OUTPATIENT
Start: 2025-01-23

## 2025-01-23 NOTE — PROGRESS NOTES
Sam Barrera M.D.  Internal Medicine  [facility]  [address], [address]  [place] [address]  [phone]      Chief Complaint  Follow-up (ER follow up discolored nails)    SUBJECTIVE      Deepika Gonzalez is a 39 y.o. female with obesity, GERD, anxiety/depression who presents to the office today as an established patient that last saw me on 8/27/2024.     Here for emergency department follow-up.  She presented to the emergency department on December 31 for fingernail discoloration and concerns that she took too much Tylenol.    She drove  [place] to have a tooth pulled. She was sedated for her procedure. Several hours after arriving home she thought her fingernails looked pale. She also notes she has been taking Tylenol 650 mg tablets, 2 tablets 3 times a day. She was prescribed hydrocodone for her dental pain and sometimes she takes 1 hydrocodone and one of the 650 mg Tylenol together instead of 2 Tylenol.  She had acetaminophen levels checked which were normal.  Her liver enzymes were normal.  She had a mild anemia which was stable.  She was referred to dermatology for nail discoloration.  History of Present Illness  The patient presents for an emergency department follow-up.    On 12/12/2023, a dental x-ray revealed a chipped tooth beneath the root canal crown, causing mild discomfort. Referred to an oral surgeon in [place], the tooth was removed. Continued discomfort led to x-ray revealing an infection, causing severe pain for two weeks. Prescribed antibiotics, steroids, topical lidocaine, and low-dose pain medications. Unable to secure a surgeon appointment in [place], she visited the ER three times due to severe pain. ER staff informed her that tooth extraction required intubation. On the surgery day, her nails turned bluish-purple. ER ruled out Tylenol poisoning and referred her to a dermatologist. Nails returned to normal. Post-tooth removal, she feels more normal,  though the area remains swollen and swallowing is difficult.    Iron supplements cause severe constipation, managed with MiraLAX. Considering iron infusions due to concerns about long-term MiraLAX use. Heavy menstrual bleeding, plans to increase iron-rich foods. Menstrual cycle lasts 5 days, heaviest on days 2 and 3. No blood in stool or urine. Needs to schedule annual gynecological appointment. Diagnosed with PCOS.    Post-gastric sleeve surgery, experienced severe fatigue, possibly due to anemia or PCOS. Attempting to reduce stress and maintain a consistent bedtime. Patient was prescribed topiramate by Dr. Marium Rojas. Patient wondering about MTHFR mutation and tests for hormone levels. Feels anxious, especially while driving, and experiences work-related stress and fatigue. Healthiest period was working from home in [id number], seeking a more flexible job. No leg swelling.        Review of Systems   Constitutional:  Positive for fatigue. Negative for chills, diaphoresis and fever.   HENT:  Negative for congestion and sore throat.    Respiratory:  Negative for cough.    Cardiovascular:  Negative for chest pain.   Gastrointestinal:  Negative for abdominal pain, nausea and vomiting.   Genitourinary:  Negative for dysuria.   Musculoskeletal:  Negative for myalgias and neck pain.   Skin:  Negative for rash.   Neurological:  Negative for weakness, numbness and headaches.     No Known Allergies     Outpatient Medications Marked as Taking for the 1/23/25 encounter (Office Visit) with Sam Barrera MD   Medication Sig Dispense Refill    Magnesium 250 MG capsule Take  by mouth.      multivitamin with minerals tablet tablet Take 1 tablet by mouth Daily.      omeprazole (priLOSEC) 40 MG capsule Take 1 capsule by mouth Daily. 90 capsule 2    Pediatric Multivit-Minerals (FLINTSTONES + EXTRA IRON PO) Take  by mouth.      vitamin D3 125 MCG (5000 UT) capsule capsule Take 1 capsule by mouth Daily.          Past Medical History:  "  Diagnosis Date    Anemia     Anxiety     Asthma     Dental crown present     lower left     Depression     GERD (gastroesophageal reflux disease)     required PPI in remote past, prior to WLS.      HTN (hypertension)     Insulin resistance     Obesity 2024    PCOS (polycystic ovarian syndrome)     Pseudotumor cerebri     Seasonal allergies     Wears glasses      Past Surgical History:   Procedure Laterality Date    EAR TUBES  1995    x 3    ENDOSCOPY  2005    Dr Hay in [facility],  showed reflux    ENDOSCOPY N/A 03/27/2019    Procedure: ESOPHAGOGASTRODUODENOSCOPY;  Surgeon: Daquan Martinez MD;  Location:  [facility];  Service: Bariatric    GASTRIC SLEEVE LAPAROSCOPIC N/A 03/27/2019    Procedure: GASTRIC SLEEVE LAPAROSCOPIC;  Surgeon: Daquan Martinez MD;  Location:  [facility];  Service: Bariatric     Family History   Problem Relation Age of Onset    Obesity Mother     Hypertension Mother     Sleep apnea Mother     Depression Mother     Arthritis Mother     Obesity Father     Hypertension Father     Arthritis Father     Sleep apnea Father     Alcohol abuse Father     Drug abuse Father     Heart disease Father     Depression Brother     Alcohol abuse Brother     Asthma Brother     Depression Brother     Drug abuse Brother     Heart disease Maternal Grandmother     Hypertension Maternal Grandmother     Hypertension Paternal Grandmother     Cancer Paternal Grandmother     Heart disease Paternal Grandmother     Obesity Paternal Grandmother     Sleep apnea Paternal Grandmother     Diabetes Paternal Grandmother     Lupus Paternal Grandmother     Cancer Paternal Grandfather     Alcohol abuse Paternal Grandfather     Hypertension Paternal Grandfather     reports that she has never smoked. She has never used smokeless tobacco. She reports that she does not currently use alcohol. She reports that she does not use drugs.    OBJECTIVE    Vital Signs:   /76   Pulse 101   Ht 165.1 cm (65\")   Wt (!) 139 kg (306 lb 6.4 " oz)   SpO2 99%   BMI 50.99 kg/m²     Physical Exam  Constitutional:       General: She is not in acute distress.     Appearance: Normal appearance.   Pulmonary:      Effort: Pulmonary effort is normal. No respiratory distress.   Skin:     Comments: Linear pigmented area under nail   Neurological:      Mental Status: She is alert. Mental status is at baseline.   Psychiatric:         Mood and Affect: Mood normal.         Behavior: Behavior normal.         Thought Content: Thought content normal.          Looks llike Longitudinal melanonychia due to darker complexion  Physical Exam      The following data was reviewed by: Sam Barrera MD on 01/23/2025:  CMP          10/3/2024    08:59 12/31/2024    18:47   CMP   Glucose 80  118    BUN 14  11    Creatinine 0.79  0.80    EGFR  96.3    Sodium 139  135    Potassium 4.3  3.9    Chloride 107  104    Calcium 8.5  9.3    Total Protein 6.7     Total Protein  8.0    Albumin 3.9  4.2    Globulin 2.8     Globulin  3.8    Total Bilirubin 0.7  0.5    Alkaline Phosphatase 85  81    AST (SGOT) 15  13    ALT (SGPT) 10  7    Albumin/Globulin Ratio  1.1    BUN/Creatinine Ratio 17.7  13.8    Anion Gap  11.5      CBC w/diff          8/30/2024    08:52 10/3/2024    08:59 12/31/2024    18:47   CBC w/Diff   WBC 7.98  6.58  8.82    RBC 4.36  4.01  4.25    Hemoglobin 11.3  10.5  11.4    Hematocrit 36.0  32.8  34.6    MCV 82.6  81.8  81.4    MCH 25.9  26.2  26.8    MCHC 31.4  32.0  32.9    RDW 15.5  15.9  15.8    Platelets 467  447  538    Neutrophil Rel % 54.5  53.8  87.2    Immature Granulocyte Rel %   0.5    Lymphocyte Rel % 34.8  33.4  9.8    Monocyte Rel % 6.8  7.6  2.4    Eosinophil Rel % 2.8  4.1  0.0    Basophil Rel % 0.8  0.9  0.1      Lipid Panel          10/3/2024    08:59   Lipid Panel   Total Cholesterol 170    Triglycerides 50    HDL Cholesterol 64    VLDL Cholesterol 10    LDL Cholesterol  96      TSH          8/30/2024    08:52 10/3/2024    08:59   TSH   TSH 2.330  1.340       A1C Last 3 Results          10/3/2024    08:59   HGBA1C Last 3 Results   Hemoglobin A1C 4.70      Data reviewed : Recent emergency department and urgent care notes              ASSESSMENT & PLAN     Seen in ED multiple times this month for dental infection. She had most recent visit due to concern of unintentional Tylenol overdose. Discussed anxiety may be causing some of her fatigue symptoms today.      Iron deficiency anemia, unspecified iron deficiency anemia type  - Mild anemia observed, B12 and folic acid levels normal  - Recommended iron infusions due to severe constipation from oral iron supplements  - Will order for iron infusions at ambulatory care unit  - Advised to schedule lab appointment to check iron levels  - Advised to discuss heavy menstrual bleeding with gynecologist  Orders:    CBC & Differential    Iron and TIBC    Ferritin    Ambulatory Referral to [facility] For Infusion Treatment    High risk medication use    Orders:    Comprehensive Metabolic Panel    Iron deficiency    Orders:    Ambulatory Referral to Gastroenterology    Fatigue, unspecified type  - MTHFR mutation test not necessary unless symptoms   - Fatigue may be related to iron deficiency anemia, PCOS, or stress  - Advised to manage stress   - Anxiety may contribute to fatigue and overstimulation  - Further discussion on anxiety management and potential treatment options during next visit    Orders:    Comprehensive Metabolic Panel    CBC & Differential    Iron and TIBC    Ferritin    Ambulatory Referral to [facility] For Infusion Treatment            Assessment & Plan        Health Maintenance Due   Topic Date Due    TDAP/TD VACCINES (2 - Tdap) 06/27/2010    ANNUAL PHYSICAL  06/20/2023    COVID-19 Vaccine (4 - 2024-25 season) 09/01/2024        Follow Up  No follow-ups on file.    Patient/family had no further questions at this time and verbalized understanding of the plan discussed today.     Patient or patient representative verbalized  consent for the use of Ambient Listening during the visit with  Sam Barrera MD for chart documentation. 1/23/2025  16:07 EST

## 2025-01-25 NOTE — ASSESSMENT & PLAN NOTE
- MTHFR mutation test not necessary unless symptoms   - Fatigue may be related to iron deficiency anemia, PCOS, or stress  - Advised to manage stress   - Anxiety may contribute to fatigue and overstimulation  - Further discussion on anxiety management and potential treatment options during next visit    Orders:    Comprehensive Metabolic Panel    CBC & Differential    Iron and TIBC    Ferritin    Ambulatory Referral to [facility] For Infusion Treatment

## 2025-01-28 NOTE — PROGRESS NOTES
"Metabolic and Bariatric Surgery Nutrition Follow Up    Patient Name: Deepika Gonzalez    YOB: 1985   Age: 39 y.o.  Sex: female  MRN: [id number]     ASSESSMENT    Procedure Performed: Sleeve  Date of Surgery: 3/27/19 by Dr Martinez    Anthropometrics  Weight as of 12/4/24: 311 lbs   Height as of 12/4/24: 65\"  BMI: 51.8 kg/m²    Surgery weight: 310 lbs  Weight change since surgery: + 1 lbs   Reason for visit: medical weight loss evaluation     Deepika Gonzalez is status post sleeve procedure and presents for medical weight loss. Patient reports they are tolerating diet well and not experiencing nausea, vomiting, reflux, diarrhea or constipation. Diet history reviewed and discussed with the patient. Weight loss/gains to date discussed with the patient. Has very busy schedule and eating is inconsistent.     DIAGNOSIS    Nutrition problem statement: Obesity related to multifactorial biochemical, behavioral and environmental contributors to disease as evidenced by BMI 51.8 kg/m².    INTERVENTION    I reviewed the appropriate dietary choices with the patient and provided guidance and suggestions for making necessary changes. Discussed sustainable habit changes and setting small, achievable goals that can be maintained long term. Recommended focus on eating protein first and aim for minimum of 70-80 grams per day. Limit or weigh/measure portions sizes for high fat and calorie foods including nuts. Discussed bariatric portion plate model for guidelines on putting together balanced meals. Use small plate and eat protein first, then vegetables, then starches. Suggested the option of keeping a food journal which will help patient become more aware of the nutritional value of food, establish starting point and identify areas for improvement. Aim for at least 64 oz water daily and wait 30 minutes after eating before drinking. Reviewed vitamin requirements. Be sure to do routine exercise, 150 minutes per week " minimum, including both cardio and strength training. Offered follow up for support and accountability.      MONITORING & EVALUATION    Goals/Plan:   Prioritize protein and aim for minimum of 70 grams daily  Measure portion sizes and track intake     Follow-Up:  1 month    Total time spent during this encounter today exceeded 30 minutes and includes preparing for the visit, reviewing tests, counseling and educating the patient/family/caregiver and documenting information in the medical record.    Electronically signed by:  Angeline Moreno RD

## 2025-02-12 ENCOUNTER — PATIENT MESSAGE (OUTPATIENT)
Dept: INTERNAL MEDICINE | Facility: CLINIC | Age: 40
End: 2025-02-12
Payer: COMMERCIAL

## 2025-02-24 LAB
ALBUMIN SERPL-MCNC: 3.8 G/DL (ref 3.5–5.2)
ALBUMIN/GLOB SERPL: 1.2 G/DL
ALP SERPL-CCNC: 82 U/L (ref 39–117)
ALT SERPL-CCNC: 8 U/L (ref 1–33)
AST SERPL-CCNC: 12 U/L (ref 1–32)
BASOPHILS # BLD AUTO: 0.05 10*3/MM3 (ref 0–0.2)
BASOPHILS NFR BLD AUTO: 0.7 % (ref 0–1.5)
BILIRUB SERPL-MCNC: 0.4 MG/DL (ref 0–1.2)
BUN SERPL-MCNC: 10 MG/DL (ref 6–20)
BUN/CREAT SERPL: 11.6 (ref 7–25)
CALCIUM SERPL-MCNC: 8.7 MG/DL (ref 8.6–10.5)
CHLORIDE SERPL-SCNC: 106 MMOL/L (ref 98–107)
CO2 SERPL-SCNC: 22.6 MMOL/L (ref 22–29)
CREAT SERPL-MCNC: 0.86 MG/DL (ref 0.57–1)
EGFRCR SERPLBLD CKD-EPI 2021: 88.3 ML/MIN/1.73
EOSINOPHIL # BLD AUTO: 0.27 10*3/MM3 (ref 0–0.4)
EOSINOPHIL NFR BLD AUTO: 3.9 % (ref 0.3–6.2)
ERYTHROCYTE [DISTWIDTH] IN BLOOD BY AUTOMATED COUNT: 15.7 % (ref 12.3–15.4)
FERRITIN SERPL-MCNC: 14.9 NG/ML (ref 13–150)
GLOBULIN SER CALC-MCNC: 3.1 GM/DL
GLUCOSE SERPL-MCNC: 80 MG/DL (ref 65–99)
HCT VFR BLD AUTO: 34.2 % (ref 34–46.6)
HGB BLD-MCNC: 10.7 G/DL (ref 12–15.9)
IMM GRANULOCYTES # BLD AUTO: 0.02 10*3/MM3 (ref 0–0.05)
IMM GRANULOCYTES NFR BLD AUTO: 0.3 % (ref 0–0.5)
IRON SATN MFR SERPL: 8 % (ref 20–50)
IRON SERPL-MCNC: 32 MCG/DL (ref 37–145)
LYMPHOCYTES # BLD AUTO: 2.73 10*3/MM3 (ref 0.7–3.1)
LYMPHOCYTES NFR BLD AUTO: 39.9 % (ref 19.6–45.3)
MCH RBC QN AUTO: 25.8 PG (ref 26.6–33)
MCHC RBC AUTO-ENTMCNC: 31.3 G/DL (ref 31.5–35.7)
MCV RBC AUTO: 82.6 FL (ref 79–97)
MONOCYTES # BLD AUTO: 0.49 10*3/MM3 (ref 0.1–0.9)
MONOCYTES NFR BLD AUTO: 7.2 % (ref 5–12)
NEUTROPHILS # BLD AUTO: 3.29 10*3/MM3 (ref 1.7–7)
NEUTROPHILS NFR BLD AUTO: 48 % (ref 42.7–76)
NRBC BLD AUTO-RTO: 0 /100 WBC (ref 0–0.2)
PLATELET # BLD AUTO: 478 10*3/MM3 (ref 140–450)
POTASSIUM SERPL-SCNC: 4.4 MMOL/L (ref 3.5–5.2)
PROT SERPL-MCNC: 6.9 G/DL (ref 6–8.5)
RBC # BLD AUTO: 4.14 10*6/MM3 (ref 3.77–5.28)
SODIUM SERPL-SCNC: 139 MMOL/L (ref 136–145)
TIBC SERPL-MCNC: 396 MCG/DL
UIBC SERPL-MCNC: 364 MCG/DL (ref 112–346)
WBC # BLD AUTO: 6.85 10*3/MM3 (ref 3.4–10.8)

## 2025-02-25 ENCOUNTER — TELEPHONE (OUTPATIENT)
Dept: INTERNAL MEDICINE | Facility: CLINIC | Age: 40
End: 2025-02-25
Payer: COMMERCIAL

## 2025-02-25 NOTE — TELEPHONE ENCOUNTER
"This is what scheduling said.     \"01/24/25 WE DO NOT GIVE INFED AT OUR FACILITIES,  [facility] , [facility], OR  [facility]. ALSO THERE IS A SHORTAGE ON VENOFER SO WE ARE NOT ABLE TO GIVE ONLY FOR OB. WE WILL ALSO NEED 2 DIAGNOSIS AND RECENT LABS FERRITIN AND IRON PANEL. THANK YOU\"     So how should we proceed since there is a shortage?     "

## 2025-02-26 NOTE — TELEPHONE ENCOUNTER
Dr. Barrera per Roma's message you will need to call [facility] [phone] to see how to proceed.

## 2025-03-20 ENCOUNTER — OFFICE VISIT (OUTPATIENT)
Dept: SLEEP MEDICINE | Facility: HOSPITAL | Age: 40
End: 2025-03-20
Payer: COMMERCIAL

## 2025-03-20 VITALS
HEIGHT: 65 IN | HEART RATE: 90 BPM | WEIGHT: 293 LBS | BODY MASS INDEX: 48.82 KG/M2 | OXYGEN SATURATION: 98 % | DIASTOLIC BLOOD PRESSURE: 83 MMHG | SYSTOLIC BLOOD PRESSURE: 131 MMHG

## 2025-03-20 DIAGNOSIS — G47.30 OBSERVED SLEEP APNEA: Primary | ICD-10-CM

## 2025-03-20 DIAGNOSIS — I10 PRIMARY HYPERTENSION: ICD-10-CM

## 2025-03-20 DIAGNOSIS — R53.83 FATIGUE, UNSPECIFIED TYPE: ICD-10-CM

## 2025-03-20 DIAGNOSIS — G47.19 EXCESSIVE DAYTIME SLEEPINESS: ICD-10-CM

## 2025-03-20 DIAGNOSIS — E66.813 CLASS 3 SEVERE OBESITY DUE TO EXCESS CALORIES WITHOUT SERIOUS COMORBIDITY WITH BODY MASS INDEX (BMI) OF 50.0 TO 59.9 IN ADULT: ICD-10-CM

## 2025-03-20 DIAGNOSIS — E66.01 CLASS 3 SEVERE OBESITY DUE TO EXCESS CALORIES WITHOUT SERIOUS COMORBIDITY WITH BODY MASS INDEX (BMI) OF 50.0 TO 59.9 IN ADULT: ICD-10-CM

## 2025-03-20 DIAGNOSIS — R06.83 SNORING: ICD-10-CM

## 2025-03-20 DIAGNOSIS — G47.8 NON-RESTORATIVE SLEEP: ICD-10-CM

## 2025-03-20 PROBLEM — R51.9 MORNING HEADACHE: Status: ACTIVE | Noted: 2025-03-20

## 2025-03-20 PROBLEM — G25.81 RESTLESS LEG SYNDROME: Status: ACTIVE | Noted: 2025-03-20

## 2025-03-20 PROCEDURE — G0463 HOSPITAL OUTPT CLINIC VISIT: HCPCS

## 2025-03-20 RX ORDER — SODIUM CHLORIDE 9 MG/ML
20 INJECTION, SOLUTION INTRAVENOUS ONCE
OUTPATIENT
Start: 2025-03-20

## 2025-03-20 RX ORDER — DIPHENHYDRAMINE HYDROCHLORIDE 50 MG/ML
50 INJECTION INTRAMUSCULAR; INTRAVENOUS AS NEEDED
OUTPATIENT
Start: 2025-03-20

## 2025-03-20 RX ORDER — FAMOTIDINE 10 MG/ML
20 INJECTION, SOLUTION INTRAVENOUS AS NEEDED
OUTPATIENT
Start: 2025-03-20

## 2025-03-20 RX ORDER — HYDROCORTISONE SODIUM SUCCINATE 100 MG/2ML
100 INJECTION INTRAMUSCULAR; INTRAVENOUS AS NEEDED
OUTPATIENT
Start: 2025-03-20

## 2025-03-20 NOTE — PROGRESS NOTES
[facility]  Sleep Medicine   [address]  [address]  [place] [address]  Phone   Fax       Deepika Gonzalez  [id number]   1985  39 y.o.  female      Referring physician/provider and PCP Sam Barrera MD    Type of service: Initial Sleep Medicine Consult.  Date of service: 3/20/2025      Chief Complaint   Patient presents with    Witnessed Apnea    Non-restorative Sleep    Fatigue    Daytime Sleepiness    Morning Headaches    Obesity       History of present illness;  Thank you for asking to see Deepika Gonzalez, 39 y.o.. The patient was seen today on 3/20/2025 at [facility].  The patient presents today with symptoms of snoring, non-restorative sleep and witnessed apneas. The symptoms are present for few years and they are persistent in nature.  The snoring is present in all positions and it is loud.  Patient has no prior surgery namely tonsillectomy, nasal surgery and UPPP.  Unfortunately she has put on significant amount of weight approximately 100 pounds.  Previously she had gastric sleeve surgery.  She is also noted to have iron deficiency and anemia.  Sporadically she gives symptoms of restless legs but not on any medication.  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  Sleep schedule:  Bedtime: 11 PM  Wake time: 6 AM   Normally takes about 10 minutes to fall asleep  Average hours of sleep 5-6  Number of naps per day none  Symptoms   In addition to snoring, nonrestorative sleep and witnessed apneas patient gives the following associated symptoms.  Have you ever awakened gasping for breath, coughing, choking: Yes   Change in weight,  Yes gained 100 pounds  Morning headaches  Yes   Awaken with a sore throat or dry mouth  Yes   Leg jerking at night:  No   Crawly feeling/urge sensation to move in the legs: Yes   Teeth grinding:Yes   Have you ever awakened at night  "with a sour taste or burning sensation in your chest:  Yes   Do you have muscle weakness with laughing or anger or sleep paralysis:  No   Have you ever felt paralyzed while going to sleep or waking up:  No   Sleepwalking, nightmares, No   Nocturia (urination at night): 0 times per night  Memory Problem:No     MEDICAL CONDITIONS (PMH)   GERD  Anemia  Anxiety and depression    Social history:  Do you drive a commercial vehicle:  No   Shift work:  No   Tobacco use:  No   Alcohol use: 0 per week  Caffeinated drinks: 2      Family Hx (parents and siblings) (pertaining to sleep medicine)  Sleep apnea  Sleepwalking  Obesity    Medications: reviewed    Review of systems:  Positive symptoms are :  Snoring  Witnessed apnea  Daytime excessive sleepiness with [place] Sleepiness Scale of Total score: 5   Fatigue  Crawly feeling in the legs      Physical exam:  CONSTITUTINONAL:  Vitals:    03/20/25 0942   BP: 131/83   Pulse: 90   SpO2: 98%   Weight: (!) 138 kg (305 lb)   Height: 165.1 cm (65\")    Body mass index is 50.75 kg/m².   NOSE:no nasal septal defects, nasal passages are clear, no nasal polyps,   THROAT: tonsils are not enlarged, tongue normal size, oral airway Mallampati class 3  NECK:Neck Circumference: 15 inches, trachea is in the midline, thyroid not enlarged  RESPIRATORY SYSTEM: Breath sounds are normal, there are no wheezes  CARDIOVASULAR SYSTEM: Heart sounds are regular rhythm and normal rate, no edema  NEUROLOGICAL SYSTEM: Oriented x 3, No speech defect, gait is normal  PSYCHIATRIC SYSTEM: Mood is normal, thought content is normal    Office notes from care team reviewed. Office note dated January 23, 2025,reviewed  Labs reviewed.  TSH Results:  TSH          8/30/2024    08:52 10/3/2024    08:59   TSH   TSH 2.330  1.340       Most Recent A1C          10/3/2024    08:59   HGBA1C Most Recent   Hemoglobin A1C 4.70           Assessment and plan:  Witnessed apneas,(R06.81) patient's symptoms and examination is consistent " with sleep apnea (G47.30). I have talked to the patient about the signs and symptoms of sleep apnea. In addition, I have also discussed pathophysiology of sleep apnea.  I also discussed the complications of untreated sleep apnea including effects on hypertension, diabetes mellitus and nonrestorative sleep with hypersomnia which can increase risk for motor vehicle accidents.  Untreated sleep apnea is also a risk factor for development of atrial fibrillation, pulmonary hypertension, and insulin resistance and stroke.  Discussed in detail of various testing methods including home-based and lab based sleep studies.  Based on history and physical examination and other comorbidities the most appropriate study is home sleep test.  The order for the sleep study is placed in [facility].  The test will be scheduled after approval from insurance. Treatment and management will be discussed after the test is completed.  Patient was given opportunity to ask questions and all the questions were answered.   Snoring (R06.83), snoring is the sound created by turbulent airflow vibrating upper airway soft tissue due to limitation of inspiratory airflow. I have also discussed factors affecting snoring including sleep deprivation, sleeping on the back and alcohol ingestion. To minimize snoring, patient is advised to have adequate sleep, sleep on the side and avoid alcohol and sedative medications before bedtime  Daytime excessive sleepiness .  It was assessed with [place] Sleepiness Scale of Total score: 5.  There are many causes for daytime excessive sleepiness including sleep depression, shiftwork syndrome, depression and other medical disorders including heart, kidney and liver failure.  The most serious cause of excessive sleepiness is due to neurological conditions like narcolepsy/cataplexy.  But the most common cause of excessive sleepiness is due to sleep apnea with frequent awakenings during sleep time.  I have discussed safety of  driving and to remain vigilant while driving.  Obesity 3, patient's BMI is Body mass index is 50.75 kg/m².. I have discussed the relationship between weight and sleep apnea.There is direct correlation between weight and severity of sleep apnea.  Weight reduction is encouraged, as it is going to reduce the severity of sleep apnea. I have also discussed with the patient diet and exercise to achieve ideal body weight.  Restless leg syndrome, patient has low iron and also unable to tolerate oral iron supplements.  In fact she has also had gastric sleeve surgery.  She may be candidate for iron infusion.  Referable preparation for restless leg syndrome is ferric carboxymaltose 750 mg IV infusion 1 dose  Return for 31 to 90 days after PAP setup with down load..  Patient's questions were answered      I once again thank you for asking me to see this patient in consultation and I have forwarded my opinion and treatment plan.  Please do not hesitate to call me if you have any questions.   3/20/2025  Cristy Grace MD  Sleep Medicine  Medical Director  [facility]: [facility]

## 2025-03-25 ENCOUNTER — OFFICE VISIT (OUTPATIENT)
Dept: GASTROENTEROLOGY | Facility: CLINIC | Age: 40
End: 2025-03-25
Payer: COMMERCIAL

## 2025-03-25 ENCOUNTER — TELEPHONE (OUTPATIENT)
Dept: GASTROENTEROLOGY | Facility: CLINIC | Age: 40
End: 2025-03-25
Payer: COMMERCIAL

## 2025-03-25 VITALS
HEIGHT: 65 IN | DIASTOLIC BLOOD PRESSURE: 82 MMHG | SYSTOLIC BLOOD PRESSURE: 117 MMHG | WEIGHT: 293 LBS | HEART RATE: 84 BPM | TEMPERATURE: 97.6 F | BODY MASS INDEX: 48.82 KG/M2

## 2025-03-25 DIAGNOSIS — Z90.3 H/O GASTRIC SLEEVE: ICD-10-CM

## 2025-03-25 DIAGNOSIS — D50.8 OTHER IRON DEFICIENCY ANEMIA: Primary | ICD-10-CM

## 2025-03-25 PROBLEM — D64.9 ABSOLUTE ANEMIA: Status: ACTIVE | Noted: 2025-03-25

## 2025-03-25 PROCEDURE — 99214 OFFICE O/P EST MOD 30 MIN: CPT | Performed by: NURSE PRACTITIONER

## 2025-03-25 NOTE — PROGRESS NOTES
Chief Complaint   Patient presents with    Iron deficiency anemia       HPI    Deepika Gonzalez is a  39 y.o. female here to establish care as a new patient for iron deficiency anemia.    This patient will also follow with Dr. Rayo.    Past medical history of gastric sleeve 2018, ADHD, anxiety, asthma, headaches, hypertension, PCOS, pseudotumor cerebri along with obesity.    Patient noted to have iron deficiency anemia dating back roughly 6 months.  She is intolerant to oral iron supplementation causes GI upset and constipation.  She has been trying to arrange iron infusions but is yet to have 1 yet.  She denies abdominal pain, rectal pain, diarrhea, constipation, poor appetite or weight loss.  BMI 50.75.  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    No nausea, vomiting, dyspepsia, dysphagia or odynophagia.    She needs to follow-up with her OB/GYN.  She reports longstanding history of intermittent heavy menstrual cycle ongoing well before recent anemia developed.  No family history of colon cancer.    Past Medical History:   Diagnosis Date    ADHD (attention deficit hyperactivity disorder) 2018    My therapist diagnosed me    Allergic 2003    Allergic rhinitis 2011    Anemia     Anxiety     Asthma     Dental crown present     lower left     Depression     Fibroid     GERD (gastroesophageal reflux disease)     required PPI in remote past, prior to WLS.      Headache 2018    HTN (hypertension)     Injury of back     Injury of neck     Insulin resistance     Lactose intolerance     Migraine     Obesity 2024    PCOS (polycystic ovarian syndrome)     Polycystic ovary syndrome     Pseudotumor cerebri     Seasonal allergies     Wears glasses        Past Surgical History:   Procedure Laterality Date    BARIATRIC SURGERY  2019    EAR TUBES  1995    x 3    ENDOSCOPY  2005    Dr Hay in [facility],  showed reflux    ENDOSCOPY N/A 03/27/2019    Procedure: ESOPHAGOGASTRODUODENOSCOPY;  Surgeon: Daquan Martinez  MD Shamir;  Location:  [facility];  Service: Bariatric    GASTRIC SLEEVE LAPAROSCOPIC N/A 03/27/2019    Procedure: GASTRIC SLEEVE LAPAROSCOPIC;  Surgeon: Daquan Martinez MD;  Location:  [facility];  Service: Bariatric    WISDOM TOOTH EXTRACTION         Scheduled Meds:     Continuous Infusions: No current facility-administered medications for this visit.      PRN Meds:     No Known Allergies    Social History     Socioeconomic History    Marital status: Single    Years of education: Masters Degree    Tobacco Use    Smoking status: Never    Smokeless tobacco: Never   Vaping Use    Vaping status: Never Used   Substance and Sexual Activity    Alcohol use: Not Currently    Drug use: Never    Sexual activity: Not Currently     Partners: Male     Birth control/protection: Abstinence       Family History   Problem Relation Age of Onset    Obesity Mother     Hypertension Mother     Sleep apnea Mother     Depression Mother     Arthritis Mother     Obesity Father     Hypertension Father     Arthritis Father     Sleep apnea Father     Alcohol abuse Father     Drug abuse Father     Heart disease Father     Depression Brother     Alcohol abuse Brother     Asthma Brother     Depression Brother     Drug abuse Brother     Heart disease Maternal Grandmother     Hypertension Maternal Grandmother     Hypertension Paternal Grandmother     Cancer Paternal Grandmother     Heart disease Paternal Grandmother     Obesity Paternal Grandmother     Sleep apnea Paternal Grandmother     Diabetes Paternal Grandmother     Lupus Paternal Grandmother     Cancer Paternal Grandfather     Alcohol abuse Paternal Grandfather     Hypertension Paternal Grandfather        Review of Systems   Gastrointestinal: Negative.        Vitals:    03/25/25 1412   BP: 117/82   Pulse: 84   Temp: 97.6 °F (36.4 °C)       Physical Exam  Constitutional:       Appearance: She is well-developed.   Abdominal:      General: Bowel sounds are normal. There is no distension.       Palpations: Abdomen is soft. There is no mass.      Tenderness: There is no abdominal tenderness. There is no guarding.      Hernia: No hernia is present.   Skin:     General: Skin is warm and dry.      Capillary Refill: Capillary refill takes less than 2 seconds.   Neurological:      Mental Status: She is alert and oriented to person, place, and time.   Psychiatric:         Behavior: Behavior normal.     Assessment    Diagnoses and all orders for this visit:    1. Other iron deficiency anemia (Primary)    2. H/O gastric sleeve    Plan    Schedule EGD and colonoscopy with Dr. Rayo  Arrange iron infusions with the [facility]  Risk-benefit of procedure reviewed with the patient  Await endoscopic findings for further recommendations and follow-up         ALICE Sandhu  [facility]  [address] [place] [address]  Office: [phone]

## 2025-03-25 NOTE — TELEPHONE ENCOUNTER
LEIGH ANN Howard for COLONOSCOPY on 6/2/25  arrive at 8:30  . Gave prep instructions to pt in office ....miralax

## 2025-03-26 ENCOUNTER — TELEPHONE (OUTPATIENT)
Dept: GASTROENTEROLOGY | Facility: CLINIC | Age: 40
End: 2025-03-26
Payer: COMMERCIAL

## 2025-03-26 DIAGNOSIS — D50.8 OTHER IRON DEFICIENCY ANEMIA: Primary | ICD-10-CM

## 2025-03-26 DIAGNOSIS — Z90.3 H/O GASTRIC SLEEVE: ICD-10-CM

## 2025-03-26 NOTE — TELEPHONE ENCOUNTER
----- Message from Juanita Brock sent at 3/25/2025  4:24 PM EDT -----  Patient needs to be referred to the [facility] for iron infusions.  Thanks Juanita

## 2025-03-27 ENCOUNTER — HOSPITAL ENCOUNTER (OUTPATIENT)
Dept: SLEEP MEDICINE | Facility: HOSPITAL | Age: 40
Discharge: HOME | End: 2025-03-27
Admitting: INTERNAL MEDICINE
Payer: COMMERCIAL

## 2025-03-27 DIAGNOSIS — R06.83 SNORING: ICD-10-CM

## 2025-03-27 DIAGNOSIS — E66.01 CLASS 3 SEVERE OBESITY DUE TO EXCESS CALORIES WITHOUT SERIOUS COMORBIDITY WITH BODY MASS INDEX (BMI) OF 50.0 TO 59.9 IN ADULT: ICD-10-CM

## 2025-03-27 DIAGNOSIS — G47.19 EXCESSIVE DAYTIME SLEEPINESS: ICD-10-CM

## 2025-03-27 DIAGNOSIS — E66.813 CLASS 3 SEVERE OBESITY DUE TO EXCESS CALORIES WITHOUT SERIOUS COMORBIDITY WITH BODY MASS INDEX (BMI) OF 50.0 TO 59.9 IN ADULT: ICD-10-CM

## 2025-03-27 DIAGNOSIS — G47.8 NON-RESTORATIVE SLEEP: ICD-10-CM

## 2025-03-27 DIAGNOSIS — I10 PRIMARY HYPERTENSION: ICD-10-CM

## 2025-03-27 DIAGNOSIS — G47.30 OBSERVED SLEEP APNEA: ICD-10-CM

## 2025-03-27 PROCEDURE — G0399 HOME SLEEP TEST/TYPE 3 PORTA: HCPCS

## 2025-04-07 DIAGNOSIS — I10 PRIMARY HYPERTENSION: ICD-10-CM

## 2025-04-07 DIAGNOSIS — R06.83 SNORING: ICD-10-CM

## 2025-04-07 DIAGNOSIS — Z98.84 S/P LAPAROSCOPIC SLEEVE GASTRECTOMY: ICD-10-CM

## 2025-04-07 DIAGNOSIS — G47.33 OSA (OBSTRUCTIVE SLEEP APNEA): Primary | ICD-10-CM

## 2025-04-07 DIAGNOSIS — E66.01 MORBID OBESITY WITH BMI OF 50.0-59.9, ADULT: ICD-10-CM

## 2025-04-08 ENCOUNTER — TELEPHONE (OUTPATIENT)
Dept: SLEEP MEDICINE | Facility: HOSPITAL | Age: 40
End: 2025-04-08
Payer: COMMERCIAL

## 2025-04-24 ENCOUNTER — OFFICE VISIT (OUTPATIENT)
Dept: INTERNAL MEDICINE | Facility: CLINIC | Age: 40
End: 2025-04-24
Payer: COMMERCIAL

## 2025-04-24 VITALS
BODY MASS INDEX: 48.82 KG/M2 | DIASTOLIC BLOOD PRESSURE: 64 MMHG | HEIGHT: 65 IN | WEIGHT: 293 LBS | SYSTOLIC BLOOD PRESSURE: 124 MMHG

## 2025-04-24 DIAGNOSIS — Z12.31 ENCOUNTER FOR SCREENING MAMMOGRAM FOR MALIGNANT NEOPLASM OF BREAST: ICD-10-CM

## 2025-04-24 DIAGNOSIS — D50.0 IRON DEFICIENCY ANEMIA DUE TO CHRONIC BLOOD LOSS: ICD-10-CM

## 2025-04-24 DIAGNOSIS — Z00.00 ANNUAL PHYSICAL EXAM: Primary | ICD-10-CM

## 2025-04-24 DIAGNOSIS — G47.33 OSA (OBSTRUCTIVE SLEEP APNEA): ICD-10-CM

## 2025-04-24 NOTE — LETTER
[facility]  Vaccine Consent Form    Patient Name:  Deepika Gonzalez  Patient :  1985     Vaccine(s) Ordered    Tdap Vaccine Greater Than or Equal To 8yo IM        Screening Checklist  The following questions should be completed prior to vaccination. If you answer “yes” to any question, it does not necessarily mean you should not be vaccinated. It just means we may need to clarify or ask more questions. If a question is unclear, please ask your healthcare provider to explain it.    Yes No   Any fever or moderate to severe illness today (mild illness and/or antibiotic treatment are not contraindications)?     Do you have a history of a serious reaction to any previous vaccinations, such as anaphylaxis, encephalopathy within 7 days, Guillain-Hollywood syndrome within 6 weeks, seizure?     Have you received any live vaccine(s) (e.g MMR, COURTNEY) or any other vaccines in the last month (to ensure duplicate doses aren't given)?     Do you have an anaphylactic allergy to latex (DTaP, DTaP-IPV, Hep A, Hep B, MenB, RV, Td, Tdap), baker’s yeast (Hep B, HPV), polysorbates (RSV, nirsevimab, PCV 20, Rotavirrus, Tdap, Shingrix), or gelatin (COURTNEY, MMR)?     Do you have an anaphylactic allergy to neomycin (Rabies, COURTNEY, MMR, IPV, Hep A), polymyxin B (IPV), or streptomycin (IPV)?      Any cancer, leukemia, AIDS, or other immune system disorder? (COURTNEY, MMR, RV)     Do you have a parent, brother, or sister with an immune system problem (if immune competence of vaccine recipient clinically verified, can proceed)? (MMR, COURTNEY)     Any recent steroid treatments for >2 weeks, chemotherapy, or radiation treatment? (COURTNEY, MMR)     Have you received antibody-containing blood transfusions or IVIG in the past 11 months (recommended interval is dependent on product)? (MMR, COURTNEY)     Have you taken antiviral drugs (acyclovir, famciclovir, valacyclovir for COURTNEY) in the last 24 or 48 hours, respectively?      Are you pregnant or planning to become  "pregnant within 1 month? (COURTNEY, MMR, HPV, IPV, MenB, Abrexvy; For Hep B- refer to Engerix-B; For RSV - Abrysvo is indicated for 32-36 weeks of pregnancy from September to January)     For infants, have you ever been told your child has had intussusception or a medical emergency involving obstruction of the intestine (Rotavirus)? If not for an infant, can skip this question.         *Ordering Physicians/APC should be consulted if \"yes\" is checked by the patient or guardian above.  I have received, read, and understand the Vaccine Information Statement (VIS) for each vaccine ordered.  I have considered my or my child's health status as well as the health status of my close contacts.  I have taken the opportunity to discuss my vaccine questions with my or my child's health care provider.   I have requested that the ordered vaccine(s) be given to me or my child.  I understand the benefits and risks of the vaccines.  I understand that I should remain in the clinic for 15 minutes after receiving the vaccine(s).  _________________________________________________________  Signature of Patient or Parent/Legal Guardian ____________________  Date   "

## 2025-04-24 NOTE — PROGRESS NOTES
Sam Barrera M.D.  Internal Medicine  [facility]  [address], [address]  [place] [address]  [phone]      Chief Complaint  Annual ExamThe patient came in for a physical exam and to follow up on their anemia.    SUBJECTIVE    History of Present Illness    Deepika Gonzalez is a 39 y.o. female who presents to the office today as an established patient that last saw me on 1/23/2025.     History of Present Illness  They haven't received the iron infusion yet but have an appointment with the hematologist next week. The sleep specialist has adjusted their treatment plan, and they have a consultation with the gastroenterologist next week, with an EGD scheduled for June 2nd. They are not experiencing any abdominal pain. The patient is on FMLA, but it doesn't cover their gastroenterologist or hematologist appointments.    The patient has been diagnosed with sleep apnea and has an appointment next Friday to calibrate their CPAP machine. Despite starting to exercise again two weeks ago, they are still feeling very tired. They report poor sleep quality, waking up frequently, feeling groggy, and suspecting that snoring is waking them up. They do not have restless legs syndrome.    The patient generally feels happy but has been more emotional due to work-related stress over the past three weeks. They enjoy activities outside of work and school.    They have known ovarian cysts and had to cancel a gynecologist appointment in early March due to work. They report heavy menstrual bleeding over the past six months. The gastroenterologist suggested that an ulcer could be a possibility. The patient has a history of gastric sleeve surgery.    They have a history of ear infections and have had three sets of tubes inserted.        Review of Systems    No Known Allergies     Outpatient Medications Marked as Taking for the 4/24/25 encounter (Office Visit) with Sam Barrera MD   Medication Sig Dispense Refill     Magnesium 250 MG capsule Take  by mouth.      multivitamin with minerals tablet tablet Take 1 tablet by mouth Daily.      omeprazole (priLOSEC) 40 MG capsule Take 1 capsule by mouth Daily. 90 capsule 2    vitamin D3 125 MCG (5000 UT) capsule capsule Take 1 capsule by mouth Daily.          Past Medical History:   Diagnosis Date    ADHD (attention deficit hyperactivity disorder) 2018    My therapist diagnosed me    Allergic 2003    Allergic rhinitis 2011    Anemia     Anxiety     Asthma     Dental crown present     lower left     Depression     Fibroid     GERD (gastroesophageal reflux disease)     required PPI in remote past, prior to WLS.      Headache 2018    HTN (hypertension)     Injury of back     Injury of neck     Insulin resistance     Lactose intolerance     Migraine     Obesity 2024    PCOS (polycystic ovarian syndrome)     Polycystic ovary syndrome     Pseudotumor cerebri     Seasonal allergies     Wears glasses      Past Surgical History:   Procedure Laterality Date    BARIATRIC SURGERY  2019    EAR TUBES  1995    x 3    ENDOSCOPY  2005    Dr Hay in [facility],  showed reflux    ENDOSCOPY N/A 03/27/2019    Procedure: ESOPHAGOGASTRODUODENOSCOPY;  Surgeon: Daquan Martinez MD;  Location:  [facility];  Service: Bariatric    GASTRIC SLEEVE LAPAROSCOPIC N/A 03/27/2019    Procedure: GASTRIC SLEEVE LAPAROSCOPIC;  Surgeon: Daquan Martinez MD;  Location:  [facility];  Service: Bariatric    WISDOM TOOTH EXTRACTION       Family History   Problem Relation Age of Onset    Obesity Mother     Hypertension Mother     Sleep apnea Mother     Depression Mother     Arthritis Mother     Obesity Father     Hypertension Father     Arthritis Father     Sleep apnea Father     Alcohol abuse Father     Drug abuse Father     Heart disease Father     Depression Brother     Alcohol abuse Brother     Asthma Brother     Depression Brother     Drug abuse Brother     Heart disease Maternal Grandmother     Hypertension Maternal  "Grandmother     Hypertension Paternal Grandmother     Cancer Paternal Grandmother     Heart disease Paternal Grandmother     Obesity Paternal Grandmother     Sleep apnea Paternal Grandmother     Diabetes Paternal Grandmother     Lupus Paternal Grandmother     Cancer Paternal Grandfather     Alcohol abuse Paternal Grandfather     Hypertension Paternal Grandfather     reports that she has never smoked. She has never used smokeless tobacco. She reports that she does not currently use alcohol. She reports that she does not use drugs.    OBJECTIVE    Vital Signs:   /64   Ht 165.1 cm (65\")   Wt (!) 138 kg (304 lb)   BMI 50.59 kg/m²     Physical Exam     Physical Exam  General Appearance: Normal.  HEENT: No cerumen, mild scarring.  Respiratory: Clear to auscultation, no wheezing, rales, or rhonchi.  Cardiovascular: Regular rate and rhythm, no murmurs, rubs, or gallops.  Gastrointestinal: Soft, no tenderness, distention, or masses.  Extremities: No edema or cyanosis.  Skin: Warm and dry, no rash.  Neurological: Grossly intact.    The following data was reviewed by: Sam Barrera MD on 04/24/2025:  CMP          10/3/2024    08:59 12/31/2024    18:47 2/24/2025    09:04   CMP   Glucose 80  118  80    BUN 14  11  10    Creatinine 0.79  0.80  0.86    EGFR 97.7  96.3  88.3    Sodium 139  135  139    Potassium 4.3  3.9  4.4    Chloride 107  104  106    Calcium 8.5  9.3  8.7    Total Protein 6.7  8.0  6.9    Albumin 3.9  4.2  3.8    Globulin 2.8  3.8  3.1    Total Bilirubin 0.7  0.5  0.4    Alkaline Phosphatase 85  81  82    AST (SGOT) 15  13  12    ALT (SGPT) 10  7  8    Albumin/Globulin Ratio 1.4  1.1  1.2    BUN/Creatinine Ratio 17.7  13.8  11.6    Anion Gap  11.5       CBC w/diff          10/3/2024    08:59 12/31/2024    18:47 2/24/2025    09:04   CBC w/Diff   WBC 6.58  8.82  6.85    RBC 4.01  4.25  4.14    Hemoglobin 10.5  11.4  10.7    Hematocrit 32.8  34.6  34.2    MCV 81.8  81.4  82.6    MCH 26.2  26.8  25.8    MCHC " 32.0  32.9  31.3    RDW 15.9  15.8  15.7    Platelets 447  538  478    Neutrophil Rel % 53.8  87.2  48.0    Immature Granulocyte Rel %  0.5     Lymphocyte Rel % 33.4  9.8  39.9    Monocyte Rel % 7.6  2.4  7.2    Eosinophil Rel % 4.1  0.0  3.9    Basophil Rel % 0.9  0.1  0.7      Lipid Panel          10/3/2024    08:59   Lipid Panel   Total Cholesterol 170    Triglycerides 50    HDL Cholesterol 64    VLDL Cholesterol 10    LDL Cholesterol  96      TSH          8/30/2024    08:52 10/3/2024    08:59   TSH   TSH 2.330  1.340      A1C Last 3 Results          10/3/2024    08:59   HGBA1C Last 3 Results   Hemoglobin A1C 4.70                   ASSESSMENT & PLAN        Annual physical exam  -Age and sex appropriate physical exam performed and documented. Updated past medical, family, social and surgical histories as well as allergies and care team list. Addressed care gaps listed in the medical record.  -Encouraged seat belt use for every car ride for patient and all occupants. Discussed securing of all guns in the home for patient and family protection. Encouraged sunscreen use to reduce risk of skin cancer for any days with sun exposure over 20 minutes. Recommended helmet if biking or riding motorcycle to prevent head trauma. Discussed the importance of smoke and carbon monoxide detectors in the home.   -Encouraged annual dental and vision exams as part of their overall health.  -Encouraged minimum of 30 minutes or more of exercise at a brisk walk or higher 5 days per week combined with a well-balanced diet.  -Immunizations reviewed and updated in EMR.  -Advised that all women who are planning or capable of pregnancy take a daily supplement containing 0.4 to 0.8 mg (400 to 800 ?g) of folic acid.  The ASCVD Risk score (Rambo DK, et al., 2019) failed to calculate for the following reasons:    The 2019 ASCVD risk score is only valid for ages 40 to 79   -Lipid screening:   Lipid Panel          10/3/2024    08:59   Lipid Panel    Total Cholesterol 170    Triglycerides 50    HDL Cholesterol 64    VLDL Cholesterol 10    LDL Cholesterol  96     -Aspirin for primary or secondary prevention: Not applicable, patient is less than age 50.  -Depression screening: PHQ2 performed and the patient's screen was negative. Stressed at work  -Diabetes screening:  negative screening in the fall  --Hypertension screening: Patient screened negative for HTN today.  -Colon cancer screening: has pending  -Cervical cancer screening:  Follows with GYN - Dr. Benavidez. Due 2026.   -Breast cancer screening: Informed patient that the USPSTF recommends biennial screening mammography for women aged 40 to 74 years. Ordering today           Iron deficiency anemia due to chronic blood loss  - Iron infusion pending; hematologist appointment next week  - Gastroenterologist consultation next week; EGD scheduled for 06/02/2025  - No abdominal pain  - now she is just planning to go to [facility]         SHAHIDA (obstructive sleep apnea)  - she is exhausted. Sleeps a lot. Started back on exercising. Wakes up frequently.   - Since her last appointment she had testing for sleep apnea  - CPAP calibration next Friday   - Discussed this is probably main cause of her fatigue       Encounter for screening mammogram for malignant neoplasm of breast    Orders:    Mammo Screening Digital Tomosynthesis Bilateral With CAD; Future      Assessment & Plan        Health Maintenance Due   Topic Date Due    TDAP/TD VACCINES (2 - Tdap) 06/27/2010    ANNUAL PHYSICAL  06/20/2023    COVID-19 Vaccine (4 - 2024-25 season) 09/01/2024        Follow Up  Return in about 6 months (around 10/24/2025) for Recheck.    Patient/family had no further questions at this time and verbalized understanding of the plan discussed today.     Patient or patient representative verbalized consent for the use of Ambient Listening during the visit with  Sam Barrera MD for chart documentation. 4/24/2025  20:24 EDT

## 2025-04-24 NOTE — ASSESSMENT & PLAN NOTE
- Iron infusion pending; hematologist appointment next week  - Gastroenterologist consultation next week; EGD scheduled for 06/02/2025  - No abdominal pain  - now she is just planning to go to [facility]

## 2025-05-07 ENCOUNTER — APPOINTMENT (OUTPATIENT)
Dept: LAB | Facility: HOSPITAL | Age: 40
End: 2025-05-07
Payer: COMMERCIAL

## 2025-05-07 ENCOUNTER — CONSULT (OUTPATIENT)
Dept: ONCOLOGY | Facility: CLINIC | Age: 40
End: 2025-05-07
Payer: COMMERCIAL

## 2025-05-07 VITALS
BODY MASS INDEX: 48.82 KG/M2 | TEMPERATURE: 98.5 F | OXYGEN SATURATION: 98 % | WEIGHT: 293 LBS | DIASTOLIC BLOOD PRESSURE: 78 MMHG | SYSTOLIC BLOOD PRESSURE: 121 MMHG | HEART RATE: 81 BPM | HEIGHT: 65 IN

## 2025-05-07 DIAGNOSIS — D50.0 IRON DEFICIENCY ANEMIA DUE TO CHRONIC BLOOD LOSS: Primary | ICD-10-CM

## 2025-05-07 DIAGNOSIS — K90.9 INTESTINAL MALABSORPTION, UNSPECIFIED TYPE: ICD-10-CM

## 2025-05-07 LAB
ALBUMIN SERPL-MCNC: 3.9 G/DL (ref 3.5–5.2)
ALBUMIN/GLOB SERPL: 1.1 G/DL
ALP SERPL-CCNC: 91 U/L (ref 39–117)
ALT SERPL W P-5'-P-CCNC: 11 U/L (ref 1–33)
ANION GAP SERPL CALCULATED.3IONS-SCNC: 8 MMOL/L (ref 5–15)
AST SERPL-CCNC: 16 U/L (ref 1–32)
BASOPHILS # BLD AUTO: 0.05 10*3/MM3 (ref 0–0.2)
BASOPHILS NFR BLD AUTO: 0.9 % (ref 0–1.5)
BILIRUB SERPL-MCNC: 0.4 MG/DL (ref 0–1.2)
BUN SERPL-MCNC: 13 MG/DL (ref 6–20)
BUN/CREAT SERPL: 16.9 (ref 7–25)
CALCIUM SPEC-SCNC: 8.9 MG/DL (ref 8.6–10.5)
CHLORIDE SERPL-SCNC: 104 MMOL/L (ref 98–107)
CO2 SERPL-SCNC: 25 MMOL/L (ref 22–29)
CREAT SERPL-MCNC: 0.77 MG/DL (ref 0.57–1)
DEPRECATED RDW RBC AUTO: 51.1 FL (ref 37–54)
EGFRCR SERPLBLD CKD-EPI 2021: 100.8 ML/MIN/1.73
EOSINOPHIL # BLD AUTO: 0.28 10*3/MM3 (ref 0–0.4)
EOSINOPHIL NFR BLD AUTO: 5.1 % (ref 0.3–6.2)
ERYTHROCYTE [DISTWIDTH] IN BLOOD BY AUTOMATED COUNT: 16.9 % (ref 12.3–15.4)
FERRITIN SERPL-MCNC: 14.5 NG/ML (ref 13–150)
FOLATE SERPL-MCNC: 13.8 NG/ML (ref 4.78–24.2)
GLOBULIN UR ELPH-MCNC: 3.5 GM/DL
GLUCOSE SERPL-MCNC: 82 MG/DL (ref 65–99)
HCT VFR BLD AUTO: 34.4 % (ref 34–46.6)
HGB BLD-MCNC: 10.2 G/DL (ref 12–15.9)
IMM GRANULOCYTES # BLD AUTO: 0.02 10*3/MM3 (ref 0–0.05)
IMM GRANULOCYTES NFR BLD AUTO: 0.4 % (ref 0–0.5)
IRON 24H UR-MRATE: 26 MCG/DL (ref 37–145)
IRON SATN MFR SERPL: 7 % (ref 20–50)
LDH SERPL-CCNC: 170 U/L (ref 135–214)
LYMPHOCYTES # BLD AUTO: 2.1 10*3/MM3 (ref 0.7–3.1)
LYMPHOCYTES NFR BLD AUTO: 38.1 % (ref 19.6–45.3)
MCH RBC QN AUTO: 24.6 PG (ref 26.6–33)
MCHC RBC AUTO-ENTMCNC: 29.7 G/DL (ref 31.5–35.7)
MCV RBC AUTO: 82.9 FL (ref 79–97)
MONOCYTES # BLD AUTO: 0.39 10*3/MM3 (ref 0.1–0.9)
MONOCYTES NFR BLD AUTO: 7.1 % (ref 5–12)
NEUTROPHILS NFR BLD AUTO: 2.67 10*3/MM3 (ref 1.7–7)
NEUTROPHILS NFR BLD AUTO: 48.4 % (ref 42.7–76)
NRBC BLD AUTO-RTO: 0 /100 WBC (ref 0–0.2)
PLATELET # BLD AUTO: 448 10*3/MM3 (ref 140–450)
PMV BLD AUTO: 8.6 FL (ref 6–12)
POTASSIUM SERPL-SCNC: 3.7 MMOL/L (ref 3.5–5.2)
PROT SERPL-MCNC: 7.4 G/DL (ref 6–8.5)
RBC # BLD AUTO: 4.15 10*6/MM3 (ref 3.77–5.28)
SODIUM SERPL-SCNC: 137 MMOL/L (ref 136–145)
TIBC SERPL-MCNC: 377 MCG/DL (ref 298–536)
TRANSFERRIN SERPL-MCNC: 253 MG/DL (ref 200–360)
VIT B12 BLD-MCNC: 604 PG/ML (ref 211–946)
WBC NRBC COR # BLD AUTO: 5.51 10*3/MM3 (ref 3.4–10.8)

## 2025-05-07 PROCEDURE — 83615 LACTATE (LD) (LDH) ENZYME: CPT | Performed by: INTERNAL MEDICINE

## 2025-05-07 PROCEDURE — 80053 COMPREHEN METABOLIC PANEL: CPT | Performed by: INTERNAL MEDICINE

## 2025-05-07 PROCEDURE — 82728 ASSAY OF FERRITIN: CPT | Performed by: INTERNAL MEDICINE

## 2025-05-07 PROCEDURE — 83540 ASSAY OF IRON: CPT | Performed by: INTERNAL MEDICINE

## 2025-05-07 PROCEDURE — 82607 VITAMIN B-12: CPT | Performed by: INTERNAL MEDICINE

## 2025-05-07 PROCEDURE — 82746 ASSAY OF FOLIC ACID SERUM: CPT | Performed by: INTERNAL MEDICINE

## 2025-05-07 PROCEDURE — 36415 COLL VENOUS BLD VENIPUNCTURE: CPT | Performed by: INTERNAL MEDICINE

## 2025-05-07 PROCEDURE — 85025 COMPLETE CBC W/AUTO DIFF WBC: CPT | Performed by: INTERNAL MEDICINE

## 2025-05-07 PROCEDURE — 84466 ASSAY OF TRANSFERRIN: CPT | Performed by: INTERNAL MEDICINE

## 2025-05-07 NOTE — PROGRESS NOTES
Hematology Initial Note    Encounter Date: 5/7/2025    Referred by: Juanita Brock, Aprn  [address]  [address] [address]       Hematology History  05/07/2025: Ms. Deepika Gonzalez is a 39 y.o. female who is here for evaluation of iron deficiency anemia.  She has history of gastric sleeve surgery in 2019 and has been taking supplements for a year, but then stopped.  Currently taking multivitamin with iron as she was not able to tolerate oral iron due to constipation.  Denied any blood in stools or dark-colored stools.  Her menstrual period's are occasionally heavy lasting for 6 to 7 days.    Past Medical History  she  has a past medical history of ADHD (attention deficit hyperactivity disorder) (2018), Allergic (2003), Allergic rhinitis (2011), Anemia, Anxiety, Asthma, Dental crown present, Depression, Fibroid, GERD (gastroesophageal reflux disease), Headache (2018), HTN (hypertension), Injury of back, Injury of neck, Insulin resistance, Lactose intolerance, Migraine, Obesity (2024), PCOS (polycystic ovarian syndrome), Polycystic ovary syndrome, Pseudotumor cerebri, Seasonal allergies, and Wears glasses.    Past Surgical History  she  has a past surgical history that includes Esophagogastroduodenoscopy (2005); Ear Tubes Removal (1995); Gastric Sleeve (N/A, 03/27/2019); Esophagogastroduodenoscopy (N/A, 03/27/2019); Bariatric Surgery (2019); and Freedom tooth extraction.    Family History  family history includes Alcohol abuse in her brother, father, and paternal grandfather; Arthritis in her father and mother; Asthma in her brother; Cancer in her paternal grandfather and paternal grandmother; Depression in her brother, brother, and mother; Diabetes in her paternal grandmother; Drug abuse in her brother and father; Heart disease in her father, maternal grandmother, and paternal grandmother; Hypertension in her father, maternal grandmother, mother, paternal grandfather, and paternal grandmother; Lupus  "in her paternal grandmother; Obesity in her father, mother, and paternal grandmother; Sleep apnea in her father, mother, and paternal grandmother.     Social History   reports that she has never smoked. She has never used smokeless tobacco. She reports that she does not currently use alcohol. She reports that she does not use drugs.    Review of Systems: 10 point review of systems negative except for ones mentioned in HPI.    Objective   /78   Pulse 81   Temp 98.5 °F (36.9 °C) (Oral)   Ht 165.1 cm (65\")   Wt (!) 140 kg (308 lb 8 oz)   SpO2 98%   BMI 51.34 kg/m²   Body surface area is 2.38 meters squared.  Body mass index is 51.34 kg/m².  Physical Exam  Vitals reviewed.   Constitutional:       Appearance: Normal appearance.   HENT:      Head: Normocephalic.      Nose: Nose normal.      Mouth/Throat:      Mouth: Mucous membranes are moist.   Eyes:      General: No scleral icterus.     Conjunctiva/sclera: Conjunctivae normal.   Cardiovascular:      Rate and Rhythm: Normal rate.      Pulses: Normal pulses.   Pulmonary:      Effort: Pulmonary effort is normal.      Breath sounds: Normal breath sounds.   Musculoskeletal:      Cervical back: Normal range of motion.   Skin:     General: Skin is warm.   Neurological:      Mental Status: She is alert. Mental status is at baseline.   Psychiatric:         Mood and Affect: Mood normal.         Behavior: Behavior normal.           Imaging/Labs  CBC w/diff          12/31/2024    18:47 2/24/2025    09:04 5/7/2025    09:52   CBC w/Diff   WBC 8.82  6.85  5.51    RBC 4.25  4.14  4.15    Hemoglobin 11.4  10.7  10.2    Hematocrit 34.6  34.2  34.4    MCV 81.4  82.6  82.9    MCH 26.8  25.8  24.6    MCHC 32.9  31.3  29.7    RDW 15.8  15.7  16.9    Platelets 538  478  448    Neutrophil Rel % 87.2  48.0  48.4    Immature Granulocyte Rel % 0.5   0.4    Lymphocyte Rel % 9.8  39.9  38.1    Monocyte Rel % 2.4  7.2  7.1    Eosinophil Rel % 0.0  3.9  5.1    Basophil Rel % 0.1  0.7  0.9 "      CMP          12/31/2024    18:47 2/24/2025    09:04 5/7/2025    09:52   CMP   Glucose 118  80  82    BUN 11  10  13    Creatinine 0.80  0.86  0.77    EGFR 96.3  88.3  100.8    Sodium 135  139  137    Potassium 3.9  4.4  3.7    Chloride 104  106  104    Calcium 9.3  8.7  8.9    Total Protein 8.0  6.9  7.4    Albumin 4.2  3.8  3.9    Globulin 3.8  3.1  3.5    Total Bilirubin 0.5  0.4  0.4    Alkaline Phosphatase 81  82  91    AST (SGOT) 13  12  16    ALT (SGPT) 7  8  11    Albumin/Globulin Ratio 1.1  1.2  1.1    BUN/Creatinine Ratio 13.8  11.6  16.9    Anion Gap 11.5   8.0      Estimated Creatinine Clearance: 139.7 mL/min (by C-G formula based on SCr of 0.77 mg/dL).    Assessment & Plan   Iron deficiency anemia due to chronic blood loss    Intestinal malabsorption, unspecified type     - Iron deficiency anemia likely secondary to poor oral absorption and menstrual blood loss.  Not able to tolerate oral iron due to constipation.  Labs today show persistent iron deficiency.  She is also planned to get EGD/colonoscopy with GI next month.    PLAN  -Will get parenteral iron replacement with IV Venofer 300 mg x 3.  - Will also evaluate for other nutritional deficiency including B12 and copper which can get deficient with bariatric surgeries.  - Return to clinic in 3 months with repeat labs    I reviewed recent lab, imaging, and pathology results as available this visit and interpreted independently and discussed with the patient. 45 minutes spent with patient with more than 50% of time on face-to-face counseling.        Aasems Jacob, MD  Hematology/Oncology

## 2025-05-10 LAB
COPPER SERPL-MCNC: 171 UG/DL (ref 80–158)
ZINC SERPL-MCNC: 65 UG/DL (ref 44–115)

## 2025-05-21 ENCOUNTER — INFUSION (OUTPATIENT)
Dept: ONCOLOGY | Facility: HOSPITAL | Age: 40
End: 2025-05-21
Payer: COMMERCIAL

## 2025-05-21 VITALS
OXYGEN SATURATION: 98 % | TEMPERATURE: 98.2 F | WEIGHT: 293 LBS | SYSTOLIC BLOOD PRESSURE: 126 MMHG | DIASTOLIC BLOOD PRESSURE: 72 MMHG | RESPIRATION RATE: 16 BRPM | HEART RATE: 74 BPM | BODY MASS INDEX: 51.59 KG/M2

## 2025-05-21 DIAGNOSIS — K90.9 IRON MALABSORPTION: Primary | ICD-10-CM

## 2025-05-21 DIAGNOSIS — D50.8 OTHER IRON DEFICIENCY ANEMIA: ICD-10-CM

## 2025-05-21 PROCEDURE — 25810000003 SODIUM CHLORIDE 0.9 % SOLUTION 250 ML FLEX CONT: Performed by: NURSE PRACTITIONER

## 2025-05-21 PROCEDURE — 96365 THER/PROPH/DIAG IV INF INIT: CPT

## 2025-05-21 PROCEDURE — 25010000002 IRON SUCROSE PER 1 MG: Performed by: NURSE PRACTITIONER

## 2025-05-21 RX ORDER — HYDROCORTISONE SODIUM SUCCINATE 100 MG/2ML
100 INJECTION INTRAMUSCULAR; INTRAVENOUS AS NEEDED
Status: CANCELLED | OUTPATIENT
Start: 2025-05-21

## 2025-05-21 RX ORDER — SODIUM CHLORIDE 9 MG/ML
20 INJECTION, SOLUTION INTRAVENOUS ONCE
OUTPATIENT
Start: 2025-05-28

## 2025-05-21 RX ORDER — CETIRIZINE HYDROCHLORIDE 10 MG/1
10 TABLET ORAL ONCE
Status: CANCELLED | OUTPATIENT
Start: 2025-05-21

## 2025-05-21 RX ORDER — DIPHENHYDRAMINE HYDROCHLORIDE 50 MG/ML
50 INJECTION, SOLUTION INTRAMUSCULAR; INTRAVENOUS AS NEEDED
Status: CANCELLED | OUTPATIENT
Start: 2025-05-21

## 2025-05-21 RX ORDER — FAMOTIDINE 20 MG/1
20 TABLET, FILM COATED ORAL ONCE
Status: COMPLETED | OUTPATIENT
Start: 2025-05-21 | End: 2025-05-21

## 2025-05-21 RX ORDER — CETIRIZINE HYDROCHLORIDE 10 MG/1
10 TABLET ORAL ONCE
Status: COMPLETED | OUTPATIENT
Start: 2025-05-21 | End: 2025-05-21

## 2025-05-21 RX ORDER — SODIUM CHLORIDE 9 MG/ML
20 INJECTION, SOLUTION INTRAVENOUS ONCE
OUTPATIENT
Start: 2025-06-04

## 2025-05-21 RX ORDER — DIPHENHYDRAMINE HYDROCHLORIDE 50 MG/ML
50 INJECTION, SOLUTION INTRAMUSCULAR; INTRAVENOUS AS NEEDED
OUTPATIENT
Start: 2025-06-04

## 2025-05-21 RX ORDER — CETIRIZINE HYDROCHLORIDE 10 MG/1
10 TABLET ORAL ONCE
OUTPATIENT
Start: 2025-05-28

## 2025-05-21 RX ORDER — FAMOTIDINE 20 MG/1
20 TABLET, FILM COATED ORAL ONCE
OUTPATIENT
Start: 2025-05-28

## 2025-05-21 RX ORDER — DIPHENHYDRAMINE HYDROCHLORIDE 50 MG/ML
50 INJECTION, SOLUTION INTRAMUSCULAR; INTRAVENOUS AS NEEDED
OUTPATIENT
Start: 2025-05-28

## 2025-05-21 RX ORDER — FAMOTIDINE 10 MG/ML
20 INJECTION, SOLUTION INTRAVENOUS AS NEEDED
OUTPATIENT
Start: 2025-06-04

## 2025-05-21 RX ORDER — CETIRIZINE HYDROCHLORIDE 10 MG/1
10 TABLET ORAL ONCE
OUTPATIENT
Start: 2025-06-04

## 2025-05-21 RX ORDER — HYDROCORTISONE SODIUM SUCCINATE 100 MG/2ML
100 INJECTION INTRAMUSCULAR; INTRAVENOUS AS NEEDED
OUTPATIENT
Start: 2025-06-04

## 2025-05-21 RX ORDER — FAMOTIDINE 20 MG/1
20 TABLET, FILM COATED ORAL ONCE
Status: CANCELLED | OUTPATIENT
Start: 2025-05-21

## 2025-05-21 RX ORDER — FAMOTIDINE 10 MG/ML
20 INJECTION, SOLUTION INTRAVENOUS AS NEEDED
Status: CANCELLED | OUTPATIENT
Start: 2025-05-21

## 2025-05-21 RX ORDER — FAMOTIDINE 20 MG/1
20 TABLET, FILM COATED ORAL ONCE
OUTPATIENT
Start: 2025-06-04

## 2025-05-21 RX ORDER — FAMOTIDINE 10 MG/ML
20 INJECTION, SOLUTION INTRAVENOUS AS NEEDED
OUTPATIENT
Start: 2025-05-28

## 2025-05-21 RX ORDER — HYDROCORTISONE SODIUM SUCCINATE 100 MG/2ML
100 INJECTION INTRAMUSCULAR; INTRAVENOUS AS NEEDED
OUTPATIENT
Start: 2025-05-28

## 2025-05-21 RX ORDER — SODIUM CHLORIDE 9 MG/ML
20 INJECTION, SOLUTION INTRAVENOUS ONCE
Status: CANCELLED | OUTPATIENT
Start: 2025-05-21

## 2025-05-21 RX ADMIN — CETIRIZINE HYDROCHLORIDE 10 MG: 10 TABLET, FILM COATED ORAL at 14:32

## 2025-05-21 RX ADMIN — SODIUM CHLORIDE 300 MG: 900 INJECTION, SOLUTION INTRAVENOUS at 15:02

## 2025-05-21 RX ADMIN — FAMOTIDINE 20 MG: 20 TABLET, FILM COATED ORAL at 14:32

## 2025-05-28 ENCOUNTER — INFUSION (OUTPATIENT)
Dept: ONCOLOGY | Facility: HOSPITAL | Age: 40
End: 2025-05-28
Payer: COMMERCIAL

## 2025-05-28 VITALS
SYSTOLIC BLOOD PRESSURE: 124 MMHG | DIASTOLIC BLOOD PRESSURE: 78 MMHG | HEART RATE: 100 BPM | TEMPERATURE: 98 F | RESPIRATION RATE: 16 BRPM | BODY MASS INDEX: 51.19 KG/M2 | OXYGEN SATURATION: 98 % | WEIGHT: 293 LBS

## 2025-05-28 DIAGNOSIS — K90.9 IRON MALABSORPTION: Primary | ICD-10-CM

## 2025-05-28 DIAGNOSIS — D50.8 OTHER IRON DEFICIENCY ANEMIA: ICD-10-CM

## 2025-05-28 PROCEDURE — 96366 THER/PROPH/DIAG IV INF ADDON: CPT

## 2025-05-28 PROCEDURE — 96365 THER/PROPH/DIAG IV INF INIT: CPT

## 2025-05-28 PROCEDURE — 25810000003 SODIUM CHLORIDE 0.9 % SOLUTION 250 ML FLEX CONT: Performed by: NURSE PRACTITIONER

## 2025-05-28 PROCEDURE — 25010000002 IRON SUCROSE PER 1 MG: Performed by: NURSE PRACTITIONER

## 2025-05-28 RX ORDER — FAMOTIDINE 20 MG/1
20 TABLET, FILM COATED ORAL ONCE
Status: COMPLETED | OUTPATIENT
Start: 2025-05-28 | End: 2025-05-28

## 2025-05-28 RX ORDER — CETIRIZINE HYDROCHLORIDE 10 MG/1
10 TABLET ORAL ONCE
Status: COMPLETED | OUTPATIENT
Start: 2025-05-28 | End: 2025-05-28

## 2025-05-28 RX ADMIN — SODIUM CHLORIDE 300 MG: 900 INJECTION, SOLUTION INTRAVENOUS at 11:46

## 2025-05-28 RX ADMIN — CETIRIZINE HYDROCHLORIDE 10 MG: 10 TABLET, FILM COATED ORAL at 11:35

## 2025-05-28 RX ADMIN — FAMOTIDINE 20 MG: 20 TABLET, FILM COATED ORAL at 11:35

## 2025-06-02 ENCOUNTER — ANESTHESIA (OUTPATIENT)
Dept: GASTROENTEROLOGY | Facility: HOSPITAL | Age: 40
End: 2025-06-02
Payer: COMMERCIAL

## 2025-06-02 ENCOUNTER — HOSPITAL ENCOUNTER (OUTPATIENT)
Facility: HOSPITAL | Age: 40
Setting detail: HOSPITAL OUTPATIENT SURGERY
Discharge: HOME | End: 2025-06-02
Attending: INTERNAL MEDICINE | Admitting: INTERNAL MEDICINE
Payer: COMMERCIAL

## 2025-06-02 ENCOUNTER — ANESTHESIA EVENT (OUTPATIENT)
Dept: GASTROENTEROLOGY | Facility: HOSPITAL | Age: 40
End: 2025-06-02
Payer: COMMERCIAL

## 2025-06-02 VITALS
OXYGEN SATURATION: 96 % | HEART RATE: 71 BPM | SYSTOLIC BLOOD PRESSURE: 110 MMHG | DIASTOLIC BLOOD PRESSURE: 54 MMHG | RESPIRATION RATE: 15 BRPM | HEIGHT: 65 IN | BODY MASS INDEX: 48.82 KG/M2 | WEIGHT: 293 LBS

## 2025-06-02 DIAGNOSIS — D50.8 OTHER IRON DEFICIENCY ANEMIA: ICD-10-CM

## 2025-06-02 LAB
B-HCG UR QL: NEGATIVE
EXPIRATION DATE: NORMAL
INTERNAL NEGATIVE CONTROL: NEGATIVE
INTERNAL POSITIVE CONTROL: POSITIVE
Lab: NORMAL

## 2025-06-02 PROCEDURE — 88305 TISSUE EXAM BY PATHOLOGIST: CPT | Performed by: INTERNAL MEDICINE

## 2025-06-02 PROCEDURE — 81025 URINE PREGNANCY TEST: CPT | Performed by: INTERNAL MEDICINE

## 2025-06-02 PROCEDURE — 25010000002 LIDOCAINE 2% SOLUTION: Performed by: NURSE ANESTHETIST, CERTIFIED REGISTERED

## 2025-06-02 PROCEDURE — 25010000002 PROPOFOL 10 MG/ML EMULSION: Performed by: NURSE ANESTHETIST, CERTIFIED REGISTERED

## 2025-06-02 PROCEDURE — 25810000003 LACTATED RINGERS PER 1000 ML: Performed by: INTERNAL MEDICINE

## 2025-06-02 PROCEDURE — 45378 DIAGNOSTIC COLONOSCOPY: CPT | Performed by: INTERNAL MEDICINE

## 2025-06-02 PROCEDURE — 43239 EGD BIOPSY SINGLE/MULTIPLE: CPT | Performed by: INTERNAL MEDICINE

## 2025-06-02 RX ORDER — SODIUM CHLORIDE 0.9 % (FLUSH) 0.9 %
10 SYRINGE (ML) INJECTION AS NEEDED
Status: DISCONTINUED | OUTPATIENT
Start: 2025-06-02 | End: 2025-06-02 | Stop reason: HOSPADM

## 2025-06-02 RX ORDER — SODIUM CHLORIDE 9 MG/ML
40 INJECTION, SOLUTION INTRAVENOUS AS NEEDED
Status: DISCONTINUED | OUTPATIENT
Start: 2025-06-02 | End: 2025-06-02 | Stop reason: HOSPADM

## 2025-06-02 RX ORDER — LIDOCAINE HYDROCHLORIDE 20 MG/ML
INJECTION, SOLUTION INFILTRATION; PERINEURAL AS NEEDED
Status: DISCONTINUED | OUTPATIENT
Start: 2025-06-02 | End: 2025-06-02 | Stop reason: SURG

## 2025-06-02 RX ORDER — PROPOFOL 10 MG/ML
VIAL (ML) INTRAVENOUS AS NEEDED
Status: DISCONTINUED | OUTPATIENT
Start: 2025-06-02 | End: 2025-06-02 | Stop reason: SURG

## 2025-06-02 RX ORDER — SODIUM CHLORIDE 0.9 % (FLUSH) 0.9 %
10 SYRINGE (ML) INJECTION EVERY 12 HOURS SCHEDULED
Status: DISCONTINUED | OUTPATIENT
Start: 2025-06-02 | End: 2025-06-02 | Stop reason: HOSPADM

## 2025-06-02 RX ORDER — SODIUM CHLORIDE, SODIUM LACTATE, POTASSIUM CHLORIDE, CALCIUM CHLORIDE 600; 310; 30; 20 MG/100ML; MG/100ML; MG/100ML; MG/100ML
30 INJECTION, SOLUTION INTRAVENOUS CONTINUOUS PRN
Status: DISCONTINUED | OUTPATIENT
Start: 2025-06-02 | End: 2025-06-02 | Stop reason: HOSPADM

## 2025-06-02 RX ADMIN — LIDOCAINE HYDROCHLORIDE 100 MG: 20 INJECTION, SOLUTION INFILTRATION; PERINEURAL at 10:24

## 2025-06-02 RX ADMIN — PROPOFOL 100 MG: 10 INJECTION, EMULSION INTRAVENOUS at 10:24

## 2025-06-02 RX ADMIN — SODIUM CHLORIDE, POTASSIUM CHLORIDE, SODIUM LACTATE AND CALCIUM CHLORIDE 30 ML/HR: 600; 310; 30; 20 INJECTION, SOLUTION INTRAVENOUS at 09:55

## 2025-06-02 RX ADMIN — PROPOFOL 200 MCG/KG/MIN: 10 INJECTION, EMULSION INTRAVENOUS at 10:24

## 2025-06-02 NOTE — DISCHARGE INSTRUCTIONS
[facility]: [facility]  PROGRESS NOTE      Xiomy Rubio is a 89 y.o. female that is being seen  today for follow-up at [facility].  Subjective   Patient is being seen for follow-up at [facility].  Patient has been anxious.  Able to walk with a walker.  Vital signs stable.  Discussed with the nurse no acute medical issues.      ROS  Negative for fever or chills  Negative for sore throat, ear pain, nasal discharge  Negative for cough, shortness of breath or wheezing  Negative for chest pain, palpitations, swelling of legs  Negative for abdominal pain, constipation, diarrhea, blood in the stools  Negative for urinary complaints  Negative for headache, dizziness, weakness or numbness  Negative for joint pain  Positive for anxiety  All other systems reviewed and were negative   Vitals:    06/26/24 0621   BP: 158/78   Pulse: 66   Resp: 17   Temp: 36.7 °C (98.1 °F)   SpO2: 97%      Vitals:    06/26/24 0621   Weight: 64.8 kg (142 lb 13.7 oz)     Body mass index is 23.06 kg/m².  Physical Exam  Constitutional: Patient does not appear to be in any acute distress  Head and Face: NCAT  Eyes: Normal external exam, EOMI  ENT: Normal external inspection of ears and nose. Oropharynx normal.  Cardiovascular: RRR, S1/S2, no murmurs, rubs, or gallops, radial pulses +2, no edema of extremities  Pulmonary: CTAB, no respiratory distress.  Abdomen: +BS, soft, non-tender, nondistended, no guarding or rebound, no masses noted  MSK: No joint swelling, normal movements of all extremities. Range of motion- normal.  Skin- No lesions, contusions, or erythema.  Peripheral puslses palpable bilaterally 2+  Neuro: AAO X3, Cranial nerves 2-12 grossly intact,DTR 2+ in all 4 limbs   Psychiatric: Judgment intact. Appropriate mood and behavior    LABS   [unfilled]  Lab Results   Component Value Date    GLUCOSE 104 (H) 06/16/2024    CALCIUM 9.3 06/16/2024     06/16/2024    K 4.2 06/16/2024    CO2 25 06/16/2024      For the next 24 hours patient needs to be with a responsible adult.    For 24 hours DO NOT drive, operate machinery, appliances, drink alcohol, make important decisions or sign legal documents.    Start with a light or bland diet if you are feeling sick to your stomach otherwise advance to regular diet as tolerated.    Follow recommendations on procedure report if provided by your doctor.    Call Dr Rayo for problems [phone]    Problems may include but not limited to: large amounts of bleeding, trouble breathing, repeated vomiting, severe unrelieved pain, fever or chills.       06/16/2024    BUN 22 06/16/2024    CREATININE 0.70 06/16/2024     Lab Results   Component Value Date    ALT 11 06/16/2024    AST 16 06/16/2024    ALKPHOS 56 06/16/2024    BILITOT 1.0 06/16/2024     Lab Results   Component Value Date    WBC 10.2 06/16/2024    HGB 15.1 06/16/2024    HCT 45.6 06/16/2024    MCV 89 06/16/2024     06/16/2024     Lab Results   Component Value Date    CHOL 187 06/17/2024    CHOL 215 (H) 01/03/2022    CHOL 196 09/07/2021     Lab Results   Component Value Date    HDL 50.0 (L) 06/17/2024    HDL 51.8 01/03/2022    HDL 53.3 09/07/2021     Lab Results   Component Value Date    LDLCALC 117 06/17/2024     Lab Results   Component Value Date    TRIG 101 06/17/2024    TRIG 143 01/03/2022    TRIG 116 09/07/2021     Lab Results   Component Value Date    HGBA1C 5.7 (H) 11/20/2023     Other labs not included in the list above were reviewed either before or during this encounter.    History    Past Medical History:   Diagnosis Date    Anxiety     Memory loss     Other conditions influencing health status 11/24/2017    History of cough    Pain in joints of unspecified hand 01/05/2016    Pain, hand joint    Personal history of diseases of the skin and subcutaneous tissue 11/24/2017    History of cellulitis    Personal history of Methicillin resistant Staphylococcus aureus infection 11/24/2017    History of methicillin resistant Staphylococcus aureus infection    Personal history of other diseases of the respiratory system 12/05/2017    History of acute bronchitis    Personal history of other specified conditions 05/04/2017    History of dizziness    Personal history of pneumonia (recurrent)     History of pneumonia     Past Surgical History:   Procedure Laterality Date    OTHER SURGICAL HISTORY  08/29/2017    Hip Surgery Left     Family History   Problem Relation Name Age of Onset    No Known Problems Mother      No Known Problems Father       Allergies   Allergen Reactions    Adhesive Tape-Silicones  Unknown    Other Unknown     tape    Cortisone Rash    Penicillin Rash    Prednisone Rash    Zoloft [Sertraline] Anxiety and GI Upset     Related to previous starting dose of 50 mg.  Will reintroduce at lower dose and titrate slowly.     No current facility-administered medications on file prior to encounter.     Current Outpatient Medications on File Prior to Encounter   Medication Sig Dispense Refill    acetaminophen (Tylenol) 325 mg tablet Take 1-2 tablets (325-650 mg) by mouth every 6 hours if needed.      busPIRone (Buspar) 10 mg tablet Take 1 tablet (10 mg) by mouth 3 times a day. 90 tablet 2    calcium carbonate 600 mg calcium (1,500 mg) tablet Take 1 tablet (1,500 mg) by mouth 2 times a day.      calcium carbonate-vitamin D3 500 mg-5 mcg (200 unit) tablet Take by mouth twice a day.      ferrous sulfate 325 (65 Fe) MG tablet Take by mouth every other day.      latanoprost (Xalatan) 0.005 % ophthalmic solution Administer 1 drop into affected eye(s) once daily at bedtime.      pantoprazole (ProtoNix) 20 mg EC tablet Take 1 tablet (20 mg) by mouth once daily.       Immunization History   Administered Date(s) Administered    Flu vaccine, quadrivalent, high-dose, preservative free, age 65y+ (FLUZONE) 10/24/2020, 09/30/2021    Influenza, seasonal, injectable 10/15/2022    Pfizer COVID-19 vaccine, Fall 2023, 12 years and older, (30mcg/0.3mL) 10/27/2023    Pfizer Purple Cap SARS-CoV-2 01/28/2021, 02/19/2021, 10/04/2021, 09/24/2022    Pneumococcal conjugate vaccine, 13-valent (PREVNAR 13) 05/07/2018    Pneumococcal polysaccharide vaccine, 23-valent, age 2 years and older (PNEUMOVAX 23) 10/06/2009, 07/16/2020    SARS-CoV-2, Unspecified 04/30/2022     Patient's medical history was reviewed and updated either before or during this encounter.  ASSESSMENT / PLAN:  Active Hospital Problems    *Anxiety      Arthritis pain, hip      Bilateral hearing loss       Patient has anxiety and needs redirection.  Patient has been  using nishatn.      Kashif Caldera MD

## 2025-06-02 NOTE — ANESTHESIA PREPROCEDURE EVALUATION
Anesthesia Evaluation     Patient summary reviewed and Nursing notes reviewed   NPO Solid Status: > 8 hours  NPO Liquid Status: > 4 hours           Airway   Mallampati: II  Neck ROM: full  No difficulty expected  Dental - normal exam     Pulmonary     breath sounds clear to auscultation  (+) asthma,sleep apnea  Cardiovascular     Rhythm: regular    (+) hypertension      Neuro/Psych  (+) headaches, psychiatric history  GI/Hepatic/Renal/Endo    (+) obesity, morbid obesity, GERD    ROS Comment: 307 lbs, BMI 51.19    Musculoskeletal     Abdominal   (+) obese   Substance History      OB/GYN          Other   blood dyscrasia anemia,                 Anesthesia Plan    ASA 3     MAC     intravenous induction     Anesthetic plan, risks, benefits, and alternatives have been provided, discussed and informed consent has been obtained with: patient.    CODE STATUS:

## 2025-06-02 NOTE — H&P
[facility]  Pre Procedure History & Physical    Chief Complaint:   Iron Deficiency Anemia    Subjective     HPI:   39 y.o. female here for EGD/colon for evaluation of ORLANDO    Past Medical History:   Past Medical History:   Diagnosis Date    ADHD (attention deficit hyperactivity disorder) 2018    My therapist diagnosed me    Allergic 2003    Allergic rhinitis 2011    Anemia     Anxiety     Asthma     Dental crown present     lower left     Depression     Fibroid     GERD (gastroesophageal reflux disease)     required PPI in remote past, prior to WLS.      H/O Otitis media     Headache 2018    HTN (hypertension)     Injury of back     Injury of neck     Insulin resistance     Lactose intolerance     Migraine     Obesity 2024    PCOS (polycystic ovarian syndrome)     Polycystic ovary syndrome     Pseudotumor cerebri     Seasonal allergies     Sleep apnea     CPAP    Wears glasses        Past Surgical History:  Past Surgical History:   Procedure Laterality Date    BARIATRIC SURGERY  2019    EAR TUBES  1995    x 3    ENDOSCOPY  2005    Dr Hay in [facility],  [facility] reflux    ENDOSCOPY N/A 03/27/2019    Procedure: ESOPHAGOGASTRODUODENOSCOPY;  Surgeon: Daquan Martinez MD;  Location:  [facility];  Service: Bariatric    GASTRIC SLEEVE LAPAROSCOPIC N/A 03/27/2019    Procedure: GASTRIC SLEEVE LAPAROSCOPIC;  Surgeon: Daquan Martinez MD;  Location:  [facility];  Service: Bariatric    TYMPANOSTOMY TUBE PLACEMENT      WISDOM TOOTH EXTRACTION         Family History:  Family History   Problem Relation Age of Onset    Obesity Mother     Hypertension Mother     Sleep apnea Mother     Depression Mother     Arthritis Mother     Obesity Father     Hypertension Father     Arthritis Father     Sleep apnea Father     Alcohol abuse Father     Drug abuse Father     Heart disease Father     Depression Brother     Alcohol abuse Brother     Asthma Brother     Depression Brother     Drug abuse Brother     Heart disease  "Maternal Grandmother     Hypertension Maternal Grandmother     Hypertension Paternal Grandmother     Cancer Paternal Grandmother     Heart disease Paternal Grandmother     Obesity Paternal Grandmother     Sleep apnea Paternal Grandmother     Diabetes Paternal Grandmother     Lupus Paternal Grandmother     Cancer Paternal Grandfather     Alcohol abuse Paternal Grandfather     Hypertension Paternal Grandfather     Malig Hyperthermia Neg Hx        Social History:   reports that she has never smoked. She has never used smokeless tobacco. She reports that she does not currently use alcohol. She reports that she does not use drugs.    Medications:   Medications Prior to Admission   Medication Sig Dispense Refill Last Dose/Taking    Cholecalciferol (VITAMIN D3 PO) Take 3 tablets by mouth Daily.   6/1/2025    Magnesium 250 MG capsule Take 250 mg by mouth Every Night.   6/1/2025    multivitamin with minerals tablet tablet Take 1 tablet by mouth Daily.   6/1/2025    omeprazole (priLOSEC) 40 MG capsule Take 1 capsule by mouth Daily. 90 capsule 2 6/1/2025    TURMERIC PO Take 1 tablet by mouth Daily.   6/1/2025       Allergies:  Patient has no known allergies.    ROS:    Pertinent items are noted in HPI     Objective     Blood pressure 123/64, pulse 84, resp. rate 17, height 165.1 cm (65\"), weight (!) 137 kg (303 lb), last menstrual period 05/05/2025, SpO2 97%, not currently breastfeeding.    Physical Exam   Constitutional: Pt is oriented to person, place, and time and well-developed, well-nourished, and in no distress.   Mouth/Throat: Oropharynx is clear and moist.   Neck: Normal range of motion.   Cardiovascular: Normal rate, regular rhythm and normal heart sounds.    Pulmonary/Chest: Effort normal and breath sounds normal.   Abdominal: Soft. Nontender  Skin: Skin is warm and dry.   Psychiatric: Mood, memory, affect and judgment normal.     Assessment & Plan     Diagnosis:  ORLANDO    Anticipated Surgical " Procedure:  EGD  Colonoscopy    The risks, benefits, and alternatives of this procedure have been discussed with the patient or the responsible party- the patient understands and agrees to proceed.

## 2025-06-04 ENCOUNTER — INFUSION (OUTPATIENT)
Dept: ONCOLOGY | Facility: HOSPITAL | Age: 40
End: 2025-06-04
Payer: COMMERCIAL

## 2025-06-04 VITALS
DIASTOLIC BLOOD PRESSURE: 59 MMHG | BODY MASS INDEX: 50.72 KG/M2 | RESPIRATION RATE: 16 BRPM | SYSTOLIC BLOOD PRESSURE: 117 MMHG | WEIGHT: 293 LBS | OXYGEN SATURATION: 97 % | HEART RATE: 73 BPM | TEMPERATURE: 97.5 F

## 2025-06-04 DIAGNOSIS — D50.8 OTHER IRON DEFICIENCY ANEMIA: ICD-10-CM

## 2025-06-04 DIAGNOSIS — K90.9 IRON MALABSORPTION: Primary | ICD-10-CM

## 2025-06-04 LAB
CYTO UR: NORMAL
LAB AP CASE REPORT: NORMAL
PATH REPORT.FINAL DX SPEC: NORMAL
PATH REPORT.GROSS SPEC: NORMAL

## 2025-06-04 PROCEDURE — 25810000003 SODIUM CHLORIDE 0.9 % SOLUTION 250 ML FLEX CONT: Performed by: NURSE PRACTITIONER

## 2025-06-04 PROCEDURE — 96365 THER/PROPH/DIAG IV INF INIT: CPT

## 2025-06-04 PROCEDURE — 25010000002 IRON SUCROSE PER 1 MG: Performed by: NURSE PRACTITIONER

## 2025-06-04 RX ORDER — FAMOTIDINE 20 MG/1
20 TABLET, FILM COATED ORAL ONCE
Status: COMPLETED | OUTPATIENT
Start: 2025-06-04 | End: 2025-06-04

## 2025-06-04 RX ORDER — SODIUM CHLORIDE 9 MG/ML
20 INJECTION, SOLUTION INTRAVENOUS ONCE
Status: DISCONTINUED | OUTPATIENT
Start: 2025-06-04 | End: 2025-06-04 | Stop reason: HOSPADM

## 2025-06-04 RX ORDER — CETIRIZINE HYDROCHLORIDE 10 MG/1
10 TABLET ORAL ONCE
Status: COMPLETED | OUTPATIENT
Start: 2025-06-04 | End: 2025-06-04

## 2025-06-04 RX ADMIN — FAMOTIDINE 20 MG: 20 TABLET, FILM COATED ORAL at 11:38

## 2025-06-04 RX ADMIN — CETIRIZINE HYDROCHLORIDE 10 MG: 10 TABLET, FILM COATED ORAL at 11:38

## 2025-06-04 RX ADMIN — SODIUM CHLORIDE 300 MG: 900 INJECTION, SOLUTION INTRAVENOUS at 12:03

## 2025-07-29 DIAGNOSIS — K90.9 IRON MALABSORPTION: Primary | ICD-10-CM

## 2025-07-29 DIAGNOSIS — D50.8 OTHER IRON DEFICIENCY ANEMIA: ICD-10-CM

## 2025-07-30 ENCOUNTER — LAB (OUTPATIENT)
Dept: LAB | Facility: HOSPITAL | Age: 40
End: 2025-07-30
Payer: COMMERCIAL

## 2025-07-30 ENCOUNTER — OFFICE VISIT (OUTPATIENT)
Dept: ONCOLOGY | Facility: CLINIC | Age: 40
End: 2025-07-30
Payer: COMMERCIAL

## 2025-07-30 VITALS
SYSTOLIC BLOOD PRESSURE: 122 MMHG | DIASTOLIC BLOOD PRESSURE: 74 MMHG | HEART RATE: 77 BPM | WEIGHT: 293 LBS | HEIGHT: 65 IN | OXYGEN SATURATION: 97 % | TEMPERATURE: 97.1 F | BODY MASS INDEX: 48.82 KG/M2

## 2025-07-30 DIAGNOSIS — D50.8 OTHER IRON DEFICIENCY ANEMIA: ICD-10-CM

## 2025-07-30 DIAGNOSIS — K90.9 IRON MALABSORPTION: ICD-10-CM

## 2025-07-30 LAB
BASOPHILS # BLD AUTO: 0.07 10*3/MM3 (ref 0–0.2)
BASOPHILS NFR BLD AUTO: 1 % (ref 0–1.5)
DEPRECATED RDW RBC AUTO: 54.3 FL (ref 37–54)
EOSINOPHIL # BLD AUTO: 0.31 10*3/MM3 (ref 0–0.4)
EOSINOPHIL NFR BLD AUTO: 4.4 % (ref 0.3–6.2)
ERYTHROCYTE [DISTWIDTH] IN BLOOD BY AUTOMATED COUNT: 17 % (ref 12.3–15.4)
FERRITIN SERPL-MCNC: 106 NG/ML (ref 13–150)
HCT VFR BLD AUTO: 37.3 % (ref 34–46.6)
HGB BLD-MCNC: 12 G/DL (ref 12–15.9)
IMM GRANULOCYTES # BLD AUTO: 0.01 10*3/MM3 (ref 0–0.05)
IMM GRANULOCYTES NFR BLD AUTO: 0.1 % (ref 0–0.5)
IRON 24H UR-MRATE: 39 MCG/DL (ref 37–145)
IRON SATN MFR SERPL: 12 % (ref 20–50)
LYMPHOCYTES # BLD AUTO: 2.62 10*3/MM3 (ref 0.7–3.1)
LYMPHOCYTES NFR BLD AUTO: 37.4 % (ref 19.6–45.3)
MCH RBC QN AUTO: 28.6 PG (ref 26.6–33)
MCHC RBC AUTO-ENTMCNC: 32.2 G/DL (ref 31.5–35.7)
MCV RBC AUTO: 88.8 FL (ref 79–97)
MONOCYTES # BLD AUTO: 0.44 10*3/MM3 (ref 0.1–0.9)
MONOCYTES NFR BLD AUTO: 6.3 % (ref 5–12)
NEUTROPHILS NFR BLD AUTO: 3.55 10*3/MM3 (ref 1.7–7)
NEUTROPHILS NFR BLD AUTO: 50.8 % (ref 42.7–76)
NRBC BLD AUTO-RTO: 0 /100 WBC (ref 0–0.2)
PLATELET # BLD AUTO: 404 10*3/MM3 (ref 140–450)
PMV BLD AUTO: 8.5 FL (ref 6–12)
RBC # BLD AUTO: 4.2 10*6/MM3 (ref 3.77–5.28)
TIBC SERPL-MCNC: 319 MCG/DL (ref 298–536)
TRANSFERRIN SERPL-MCNC: 214 MG/DL (ref 200–360)
WBC NRBC COR # BLD AUTO: 7 10*3/MM3 (ref 3.4–10.8)

## 2025-07-30 PROCEDURE — 82728 ASSAY OF FERRITIN: CPT

## 2025-07-30 PROCEDURE — 83540 ASSAY OF IRON: CPT

## 2025-07-30 PROCEDURE — 85025 COMPLETE CBC W/AUTO DIFF WBC: CPT

## 2025-07-30 PROCEDURE — 84466 ASSAY OF TRANSFERRIN: CPT

## 2025-07-30 PROCEDURE — 36415 COLL VENOUS BLD VENIPUNCTURE: CPT

## 2025-07-30 NOTE — PROGRESS NOTES
REASONS FOR FOLLOW-UP:  Iron deficiency anemia in the setting of gastric sleeve surgery in 2019 and menorrhagia    HISTORY OF PRESENT ILLNESS:  The patient is a 39 y.o. year old female who is here for follow-up with the above-mentioned history.  We met the patient in initial consultation 5/7/2025, evaluated by Dr. Abreu at that time for further workup of iron deficiency anemia, unresponsive to oral iron in the setting of previous gastric sleeve surgery history of.  Labs at that time confirmed iron deficiency with ferritin of 14, iron sat of 7%.  Hgb 10.2.  We did go on to give her Venofer 300 mg x 3.      She is now seen back for 8-week follow-up.  Hgb has normalized to 12.0.  Iron sat does remain low at 12%.  She notes some benefit from recent IV iron but is still having some degree of fatigue.  Additionally she is reporting significant to heavy menstrual cycle at this time.  She notes that she will soak around 5 extra-large pads every day for about 6 days.  States she cannot take birth control and is not interested yet in any type of permanent intervention such as hysterectomy.  She does have PCOS and notes plans for follow-up with her GYN next week with plans for repeat vaginal ultrasound.    She denies other concerns at this time.        Past Medical History:   Diagnosis Date    ADHD (attention deficit hyperactivity disorder) 2018    My therapist diagnosed me    Allergic 2003    Allergic rhinitis 2011    Anemia     Anxiety     Asthma     Dental crown present     lower left     Depression     Fibroid     GERD (gastroesophageal reflux disease)     required PPI in remote past, prior to WLS.      H/O Otitis media     Headache 2018    HTN (hypertension)     Injury of back     Injury of neck     Insulin resistance     Lactose intolerance     Migraine     Obesity 2024    PCOS (polycystic ovarian syndrome)     Polycystic ovary syndrome     Pseudotumor cerebri     Seasonal allergies     Sleep apnea     CPAP    Wears  glasses      Past Surgical History:   Procedure Laterality Date    BARIATRIC SURGERY  2019    COLONOSCOPY N/A 6/2/2025    Procedure: COLONOSCOPY to cecum and TI;  Surgeon: Rolando Rayo MD;  Location:  [facility];  Service: Gastroenterology;  Laterality: N/A;  PRE - anemia  POST - normal    EAR TUBES  1995    x 3    ENDOSCOPY  2005    Dr Hay in [facility],  showed reflux    ENDOSCOPY N/A 03/27/2019    Procedure: ESOPHAGOGASTRODUODENOSCOPY;  Surgeon: Daquan Martinez MD;  Location:  [facility];  Service: Bariatric    ENDOSCOPY N/A 6/2/2025    Procedure: ESOPHAGOGASTRODUODENOSCOPY with biopsies;  Surgeon: Rolando Rayo MD;  Location:  [facility];  Service: Gastroenterology;  Laterality: N/A;  PRE - anemia  PSOT - gastric sleeve otherwise normal    GASTRIC SLEEVE LAPAROSCOPIC N/A 03/27/2019    Procedure: GASTRIC SLEEVE LAPAROSCOPIC;  Surgeon: Daquan Martinez MD;  Location:  [facility];  Service: Bariatric    TYMPANOSTOMY TUBE PLACEMENT      WISDOM TOOTH EXTRACTION         MEDICATIONS    Current Outpatient Medications:     Cholecalciferol (VITAMIN D3 PO), Take 3 tablets by mouth Daily., Disp: , Rfl:     Magnesium 250 MG capsule, Take 250 mg by mouth Every Night., Disp: , Rfl:     multivitamin with minerals tablet tablet, Take 1 tablet by mouth Daily., Disp: , Rfl:     omeprazole (priLOSEC) 40 MG capsule, Take 1 capsule by mouth Daily., Disp: 90 capsule, Rfl: 2    TURMERIC PO, Take 1 tablet by mouth Daily., Disp: , Rfl:     ALLERGIES:   No Known Allergies    SOCIAL HISTORY:       Social History     Socioeconomic History    Marital status: Single    Years of education: Masters Degree    Tobacco Use    Smoking status: Never    Smokeless tobacco: Never   Vaping Use    Vaping status: Never Used   Substance and Sexual Activity    Alcohol use: Not Currently    Drug use: Never    Sexual activity: Not Currently     Partners: Male     Birth control/protection: Abstinence         FAMILY  "HISTORY:  Family History   Problem Relation Age of Onset    Obesity Mother     Hypertension Mother     Sleep apnea Mother     Depression Mother     Arthritis Mother     Obesity Father     Hypertension Father     Arthritis Father     Sleep apnea Father     Alcohol abuse Father     Drug abuse Father     Heart disease Father     Depression Brother     Alcohol abuse Brother     Asthma Brother     Depression Brother     Drug abuse Brother     Heart disease Maternal Grandmother     Hypertension Maternal Grandmother     Hypertension Paternal Grandmother     Cancer Paternal Grandmother     Heart disease Paternal Grandmother     Obesity Paternal Grandmother     Sleep apnea Paternal Grandmother     Diabetes Paternal Grandmother     Lupus Paternal Grandmother     Cancer Paternal Grandfather     Alcohol abuse Paternal Grandfather     Hypertension Paternal Grandfather     Malig Hyperthermia Neg Hx        REVIEW OF SYSTEMS:  Review of Systems         Vitals:    07/30/25 1340   BP: 122/74   Pulse: 77   Temp: 97.1 °F (36.2 °C)   TempSrc: Skin   SpO2: 97%   Weight: (!) 141 kg (311 lb)   Height: 165.1 cm (65\")   PainSc: 0-No pain         7/30/2025     1:44 PM   Current Status   ECOG score 0       PHYSICAL EXAM:      In no acute distress.  Awake, alert, appropriately verbal.  Breathing unlabored, no apparent use of accessory muscles of respiration.  Cranial nerves II-XII grossly intact, no focal deficits.  Appropriate affect.  Asking appropriate questions.       RECENT LABS:  Results from last 7 days   Lab Units 07/30/25  1334   WBC 10*3/mm3 7.00   NEUTROS ABS 10*3/mm3 3.55   HEMOGLOBIN g/dL 12.0   HEMATOCRIT % 37.3   PLATELETS 10*3/mm3 404     Results from last 7 days   Lab Units 07/30/25  1334   FERRITIN ng/mL 106.00   IRON mcg/dL 39   TIBC mcg/dL 319   Iron sat 12%        ASSESSMENT:  39 y.o. female with iron deficiency anemia not responsive to oral iron in the setting of both gastric sleeve and menorrhagia.    5/7/2025 seen in " initial consultation for further workup of ORLANDO.  Ferritin of 14, iron sat of 7%.  Hgb 10.2.    Patient proceeding with IV Venofer 300 mg x 3 given 5/21, 5/28, 6/4/2025  States she cannot take birth control and is not interested yet in undergoing hysterectomy.  7/30/2025 reviewed back today and 8-week follow-up.  Hgb has improved to 12.0.  Ferritin up to 106 but iron sat remains low at 12% and she is reporting ongoing heavy menstrual cycle at this very time.  Will pursue additional IV iron with Venofer 300 mg x 3 additional doses.  She will also follow-up with her GYN next week.      PLAN:  Proceed with additional IV Venofer 300 mg x 3  Otherwise return in 3 months for follow-up with Dr. Marin ([facility] care, former Dr. Abreu patient) with ferritin, iron profile, CBC.  Explained to patient that in the setting of previous gastric sleeve and ongoing menorrhagia, she will likely continue to need intermittent IV iron ongoing.    I spent 35 minutes caring for Deepika on this date of service. This time includes time spent by me in the following activities: preparing for the visit, reviewing tests, performing a medically appropriate examination and/or evaluation, counseling and educating the patient/family/caregiver, referring and communicating with other health care professionals, documenting information in the medical record, care coordination, ordering test(s), obtaining a separately obtained history, and reviewing a separately obtained history

## 2025-08-04 DIAGNOSIS — K90.9 IRON MALABSORPTION: ICD-10-CM

## 2025-08-04 DIAGNOSIS — D50.0 IRON DEFICIENCY ANEMIA DUE TO CHRONIC BLOOD LOSS: Primary | ICD-10-CM

## 2025-08-04 RX ORDER — FAMOTIDINE 10 MG/ML
20 INJECTION, SOLUTION INTRAVENOUS ONCE
OUTPATIENT
Start: 2025-08-13

## 2025-08-04 RX ORDER — CETIRIZINE HYDROCHLORIDE 10 MG/1
10 TABLET ORAL ONCE
OUTPATIENT
Start: 2025-08-20

## 2025-08-04 RX ORDER — DIPHENHYDRAMINE HYDROCHLORIDE 50 MG/ML
50 INJECTION, SOLUTION INTRAMUSCULAR; INTRAVENOUS AS NEEDED
OUTPATIENT
Start: 2025-08-13

## 2025-08-04 RX ORDER — SODIUM CHLORIDE 9 MG/ML
20 INJECTION, SOLUTION INTRAVENOUS ONCE
OUTPATIENT
Start: 2025-08-06

## 2025-08-04 RX ORDER — FAMOTIDINE 10 MG/ML
20 INJECTION, SOLUTION INTRAVENOUS ONCE
OUTPATIENT
Start: 2025-08-20

## 2025-08-04 RX ORDER — FAMOTIDINE 10 MG/ML
20 INJECTION, SOLUTION INTRAVENOUS AS NEEDED
OUTPATIENT
Start: 2025-08-06

## 2025-08-04 RX ORDER — SODIUM CHLORIDE 9 MG/ML
20 INJECTION, SOLUTION INTRAVENOUS ONCE
OUTPATIENT
Start: 2025-08-13

## 2025-08-04 RX ORDER — DIPHENHYDRAMINE HYDROCHLORIDE 50 MG/ML
50 INJECTION, SOLUTION INTRAMUSCULAR; INTRAVENOUS AS NEEDED
OUTPATIENT
Start: 2025-08-20

## 2025-08-04 RX ORDER — HYDROCORTISONE SODIUM SUCCINATE 100 MG/2ML
100 INJECTION INTRAMUSCULAR; INTRAVENOUS AS NEEDED
OUTPATIENT
Start: 2025-08-20

## 2025-08-04 RX ORDER — FAMOTIDINE 10 MG/ML
20 INJECTION, SOLUTION INTRAVENOUS ONCE
OUTPATIENT
Start: 2025-08-06

## 2025-08-04 RX ORDER — SODIUM CHLORIDE 9 MG/ML
20 INJECTION, SOLUTION INTRAVENOUS ONCE
OUTPATIENT
Start: 2025-08-20

## 2025-08-04 RX ORDER — FAMOTIDINE 10 MG/ML
20 INJECTION, SOLUTION INTRAVENOUS AS NEEDED
OUTPATIENT
Start: 2025-08-13

## 2025-08-04 RX ORDER — HYDROCORTISONE SODIUM SUCCINATE 100 MG/2ML
100 INJECTION INTRAMUSCULAR; INTRAVENOUS AS NEEDED
OUTPATIENT
Start: 2025-08-13

## 2025-08-04 RX ORDER — CETIRIZINE HYDROCHLORIDE 10 MG/1
10 TABLET ORAL ONCE
OUTPATIENT
Start: 2025-08-13

## 2025-08-04 RX ORDER — CETIRIZINE HYDROCHLORIDE 10 MG/1
10 TABLET ORAL ONCE
OUTPATIENT
Start: 2025-08-06

## 2025-08-04 RX ORDER — FAMOTIDINE 10 MG/ML
20 INJECTION, SOLUTION INTRAVENOUS AS NEEDED
OUTPATIENT
Start: 2025-08-20

## 2025-08-04 RX ORDER — DIPHENHYDRAMINE HYDROCHLORIDE 50 MG/ML
50 INJECTION, SOLUTION INTRAMUSCULAR; INTRAVENOUS AS NEEDED
OUTPATIENT
Start: 2025-08-06

## 2025-08-04 RX ORDER — HYDROCORTISONE SODIUM SUCCINATE 100 MG/2ML
100 INJECTION INTRAMUSCULAR; INTRAVENOUS AS NEEDED
OUTPATIENT
Start: 2025-08-06

## 2025-08-14 ENCOUNTER — INFUSION (OUTPATIENT)
Dept: ONCOLOGY | Facility: HOSPITAL | Age: 40
End: 2025-08-14
Payer: COMMERCIAL

## 2025-08-14 VITALS
HEART RATE: 78 BPM | SYSTOLIC BLOOD PRESSURE: 136 MMHG | WEIGHT: 293 LBS | BODY MASS INDEX: 51.95 KG/M2 | RESPIRATION RATE: 18 BRPM | OXYGEN SATURATION: 97 % | DIASTOLIC BLOOD PRESSURE: 83 MMHG | TEMPERATURE: 98 F

## 2025-08-14 DIAGNOSIS — K90.9 IRON MALABSORPTION: ICD-10-CM

## 2025-08-14 DIAGNOSIS — D50.0 IRON DEFICIENCY ANEMIA DUE TO CHRONIC BLOOD LOSS: Primary | ICD-10-CM

## 2025-08-14 PROCEDURE — 96365 THER/PROPH/DIAG IV INF INIT: CPT

## 2025-08-14 PROCEDURE — 96375 TX/PRO/DX INJ NEW DRUG ADDON: CPT

## 2025-08-14 PROCEDURE — 25010000002 IRON SUCROSE PER 1 MG: Performed by: INTERNAL MEDICINE

## 2025-08-14 PROCEDURE — 25010000002 FAMOTIDINE 10 MG/ML SOLUTION: Performed by: INTERNAL MEDICINE

## 2025-08-14 PROCEDURE — 25810000003 SODIUM CHLORIDE 0.9 % SOLUTION 250 ML FLEX CONT: Performed by: INTERNAL MEDICINE

## 2025-08-14 RX ORDER — SODIUM CHLORIDE 9 MG/ML
20 INJECTION, SOLUTION INTRAVENOUS ONCE
Status: DISCONTINUED | OUTPATIENT
Start: 2025-08-14 | End: 2025-08-14 | Stop reason: HOSPADM

## 2025-08-14 RX ORDER — FAMOTIDINE 10 MG/ML
20 INJECTION, SOLUTION INTRAVENOUS ONCE
Status: COMPLETED | OUTPATIENT
Start: 2025-08-14 | End: 2025-08-14

## 2025-08-14 RX ORDER — CETIRIZINE HYDROCHLORIDE 10 MG/1
10 TABLET ORAL ONCE
Status: COMPLETED | OUTPATIENT
Start: 2025-08-14 | End: 2025-08-14

## 2025-08-14 RX ADMIN — FAMOTIDINE 20 MG: 10 INJECTION INTRAVENOUS at 14:00

## 2025-08-14 RX ADMIN — CETIRIZINE HYDROCHLORIDE 10 MG: 10 TABLET, FILM COATED ORAL at 14:00

## 2025-08-14 RX ADMIN — SODIUM CHLORIDE 300 MG: 9 INJECTION, SOLUTION INTRAVENOUS at 14:23

## 2025-08-21 ENCOUNTER — INFUSION (OUTPATIENT)
Dept: ONCOLOGY | Facility: HOSPITAL | Age: 40
End: 2025-08-21
Payer: COMMERCIAL

## 2025-08-21 ENCOUNTER — HOSPITAL ENCOUNTER (OUTPATIENT)
Dept: MAMMOGRAPHY | Facility: HOSPITAL | Age: 40
Discharge: HOME | End: 2025-08-21
Admitting: STUDENT IN AN ORGANIZED HEALTH CARE EDUCATION/TRAINING PROGRAM
Payer: COMMERCIAL

## 2025-08-21 VITALS
BODY MASS INDEX: 52.19 KG/M2 | RESPIRATION RATE: 16 BRPM | OXYGEN SATURATION: 98 % | SYSTOLIC BLOOD PRESSURE: 139 MMHG | TEMPERATURE: 98.2 F | DIASTOLIC BLOOD PRESSURE: 76 MMHG | HEART RATE: 91 BPM | WEIGHT: 293 LBS

## 2025-08-21 DIAGNOSIS — K90.9 IRON MALABSORPTION: ICD-10-CM

## 2025-08-21 DIAGNOSIS — Z12.31 ENCOUNTER FOR SCREENING MAMMOGRAM FOR MALIGNANT NEOPLASM OF BREAST: ICD-10-CM

## 2025-08-21 DIAGNOSIS — D50.0 IRON DEFICIENCY ANEMIA DUE TO CHRONIC BLOOD LOSS: Primary | ICD-10-CM

## 2025-08-21 PROCEDURE — 77067 SCR MAMMO BI INCL CAD: CPT

## 2025-08-21 PROCEDURE — 25010000002 FAMOTIDINE 10 MG/ML SOLUTION: Performed by: INTERNAL MEDICINE

## 2025-08-21 PROCEDURE — 25010000002 IRON SUCROSE PER 1 MG: Performed by: INTERNAL MEDICINE

## 2025-08-21 PROCEDURE — 96365 THER/PROPH/DIAG IV INF INIT: CPT

## 2025-08-21 PROCEDURE — 77063 BREAST TOMOSYNTHESIS BI: CPT

## 2025-08-21 PROCEDURE — 25810000003 SODIUM CHLORIDE 0.9 % SOLUTION 250 ML FLEX CONT: Performed by: INTERNAL MEDICINE

## 2025-08-21 PROCEDURE — 96375 TX/PRO/DX INJ NEW DRUG ADDON: CPT

## 2025-08-21 RX ORDER — CETIRIZINE HYDROCHLORIDE 10 MG/1
10 TABLET ORAL ONCE
Status: COMPLETED | OUTPATIENT
Start: 2025-08-21 | End: 2025-08-21

## 2025-08-21 RX ORDER — OMEPRAZOLE 40 MG/1
40 CAPSULE, DELAYED RELEASE ORAL DAILY
Qty: 90 CAPSULE | Refills: 2 | Status: SHIPPED | OUTPATIENT
Start: 2025-08-21

## 2025-08-21 RX ORDER — FAMOTIDINE 10 MG/ML
20 INJECTION, SOLUTION INTRAVENOUS ONCE
Status: COMPLETED | OUTPATIENT
Start: 2025-08-21 | End: 2025-08-21

## 2025-08-21 RX ADMIN — FAMOTIDINE 20 MG: 10 INJECTION INTRAVENOUS at 12:56

## 2025-08-21 RX ADMIN — SODIUM CHLORIDE 300 MG: 9 INJECTION, SOLUTION INTRAVENOUS at 13:16

## 2025-08-21 RX ADMIN — CETIRIZINE HYDROCHLORIDE 10 MG: 10 TABLET, FILM COATED ORAL at 12:49

## 2025-08-22 ENCOUNTER — RESULTS FOLLOW-UP (OUTPATIENT)
Dept: INTERNAL MEDICINE | Facility: CLINIC | Age: 40
End: 2025-08-22
Payer: COMMERCIAL

## 2025-08-22 ENCOUNTER — TELEPHONE (OUTPATIENT)
Dept: INTERNAL MEDICINE | Facility: CLINIC | Age: 40
End: 2025-08-22
Payer: COMMERCIAL

## 2025-08-22 DIAGNOSIS — R92.8 ABNORMAL MAMMOGRAM OF LEFT BREAST: Primary | ICD-10-CM

## (undated) DEVICE — LN SMPL CO2 SHTRM SD STREAM W/M LUER

## (undated) DEVICE — TUBING, SUCTION, 1/4" X 10', STRAIGHT: Brand: MEDLINE

## (undated) DEVICE — POWER SHELL: Brand: SIGNIA

## (undated) DEVICE — APPL HEMOS FOR DELIVERY FLOSEAL

## (undated) DEVICE — SUT MONOCRYL PLS ANTIB UND 3/0  PS1 27IN

## (undated) DEVICE — COVER,LIGHT HANDLE,FLX,1/PK: Brand: MEDLINE INDUSTRIES, INC.

## (undated) DEVICE — AIRWY 90MM NO9

## (undated) DEVICE — CANN O2 ETCO2 FITS ALL CONN CO2 SMPL A/ 7IN DISP LF

## (undated) DEVICE — FRCP BX RADJAW4 NDL 2.8 240CM LG OG BX40

## (undated) DEVICE — 3 RING SUTURE PASSER - 16 CM: Brand: 3 RING SUTURE PASSER - 16 CM

## (undated) DEVICE — SENSR O2 OXIMAX FNGR A/ 18IN NONSTR

## (undated) DEVICE — [HIGH FLOW INSUFFLATOR,  DO NOT USE IF PACKAGE IS DAMAGED,  KEEP DRY,  KEEP AWAY FROM SUNLIGHT,  PROTECT FROM HEAT AND RADIOACTIVE SOURCES.]: Brand: PNEUMOSURE

## (undated) DEVICE — Device: Brand: DEFENDO AIR/WATER/SUCTION AND BIOPSY VALVE

## (undated) DEVICE — GOWN,NON-REINFORCED,SIRUS,SET IN SLV,XXL: Brand: MEDLINE

## (undated) DEVICE — PK BARIATRIC 10

## (undated) DEVICE — SHT AIR TRANSFR COMFRT GLIDE LT LAT 40X80IN

## (undated) DEVICE — APPL CHLORAPREP W/TINT 26ML BLU

## (undated) DEVICE — TISSUE RETRIEVAL SYSTEM: Brand: INZII RETRIEVAL SYSTEM

## (undated) DEVICE — MSK ENDO PORT O2 POM ELITE CURAPLEX A/

## (undated) DEVICE — UNDRPD COMFRT GLD DRYPAD 36X57IN

## (undated) DEVICE — CANN NASL CO2 DIVIDED A/

## (undated) DEVICE — THE BITE BLOCK MAXI, LATEX FREE STRAP IS USED TO PROTECT THE ENDOSCOPE INSERTION TUBE FROM BEING BITTEN BY THE PATIENT.

## (undated) DEVICE — CONTN GRAD MEAS TRIANG 32OZ BLK

## (undated) DEVICE — SINGLE-USE BIOPSY FORCEPS: Brand: RADIAL JAW 4

## (undated) DEVICE — MARYLAND JAW LAPAROSCOPIC SEALER/DIVIDER COATED: Brand: LIGASURE

## (undated) DEVICE — ENDOPATH XCEL BLADELESS TROCARS WITH STABILITY SLEEVES: Brand: ENDOPATH XCEL

## (undated) DEVICE — TROCAR: Brand: KII FIOS FIRST ENTRY

## (undated) DEVICE — GLV SURG SENSICARE MICRO PF LF 8.5 STRL

## (undated) DEVICE — ENDOPATH XCEL UNIVERSAL TROCAR STABLILITY SLEEVES: Brand: ENDOPATH XCEL

## (undated) DEVICE — BLCK/BITE BLOX W/DENTL/RIM W/STRAP 54F

## (undated) DEVICE — FLTR PLUMEPORT LAP W/CONN STRL

## (undated) DEVICE — KT ORCA ORCAPOD DISP STRL

## (undated) DEVICE — GLV SURG SENSICARE MICRO PF LF 9 STRL

## (undated) DEVICE — MEDI-VAC NON-CONDUCTIVE SUCTION TUBING: Brand: CARDINAL HEALTH

## (undated) DEVICE — APL DUPLOSPRAYER MIS 40CM

## (undated) DEVICE — MEDI-VAC YANKAUER SUCTION HANDLE W/BULBOUS TIP: Brand: CARDINAL HEALTH

## (undated) DEVICE — SKIN AFFIX SURG ADHESIVE 72/CS 0.55ML: Brand: MEDLINE

## (undated) DEVICE — ADAPT CLN BIOGUARD AIR/H2O DISP